# Patient Record
Sex: FEMALE | Race: BLACK OR AFRICAN AMERICAN | NOT HISPANIC OR LATINO | Employment: FULL TIME | URBAN - METROPOLITAN AREA
[De-identification: names, ages, dates, MRNs, and addresses within clinical notes are randomized per-mention and may not be internally consistent; named-entity substitution may affect disease eponyms.]

---

## 2017-10-02 ENCOUNTER — TELEPHONE (OUTPATIENT)
Dept: ENDOSCOPY | Facility: HOSPITAL | Age: 52
End: 2017-10-02

## 2017-10-02 ENCOUNTER — OFFICE VISIT (OUTPATIENT)
Dept: GASTROENTEROLOGY | Facility: CLINIC | Age: 52
End: 2017-10-02
Payer: COMMERCIAL

## 2017-10-02 VITALS
HEART RATE: 65 BPM | HEIGHT: 65 IN | SYSTOLIC BLOOD PRESSURE: 123 MMHG | WEIGHT: 221.56 LBS | BODY MASS INDEX: 36.91 KG/M2 | DIASTOLIC BLOOD PRESSURE: 77 MMHG

## 2017-10-02 DIAGNOSIS — K21.9 GASTROESOPHAGEAL REFLUX DISEASE WITHOUT ESOPHAGITIS: Primary | ICD-10-CM

## 2017-10-02 PROCEDURE — 3008F BODY MASS INDEX DOCD: CPT | Mod: S$GLB,,, | Performed by: PHYSICIAN ASSISTANT

## 2017-10-02 PROCEDURE — 99999 PR PBB SHADOW E&M-NEW PATIENT-LVL III: CPT | Mod: PBBFAC,,, | Performed by: PHYSICIAN ASSISTANT

## 2017-10-02 PROCEDURE — 99204 OFFICE O/P NEW MOD 45 MIN: CPT | Mod: S$GLB,,, | Performed by: PHYSICIAN ASSISTANT

## 2017-10-02 NOTE — PROGRESS NOTES
[facility] Consultation Note    Reason for Consult:  Gastroesophageal reflux    PCP: Carrillo Jackson III     Referring MD:   Aaareferral Self  No address on file    HPI:  This is a 52 y.o. female here for evaluation of gastroesophageal reflux.  Duration - off an on for a few years  Currently taking - pepcid takes a while to work, chewable gaviscon as needed    Typical GERD Symptoms  Pyrosis - severe  Regurgitation- single episode that she can recall.  Nocturnal symptoms - rare  Nausea-no  Vomiting-no   Dysphagia/Odynophagia - rare  Epigastric abdominal pain- no     Diet / Lifestyle:  Last Meal at 7p lying down / reclining at  10:30p       Food Triggers - red sauce, spicy, she is avoiding them  Caffeine intake - rare  Occasional alcohol, occasional soda  NSAID usage - occasional    Prior EGD date/findings: no   DEXA Scan: no    Colonoscopy at age 49 - no polyps - at [facility]    ROS:  Constitutional: No fevers, chills, No weight loss  ENT: No allergies  CV: No chest pain  Pulm: No cough, No shortness of breath  Ophtho: No vision changes  GI: see HPI  Derm: No rash  Heme: No lymphadenopathy, No bruising  MSK: No arthritis  : No dysuria, No hematuria  Endo: No hot or cold intolerance  Neuro: No syncope, No seizure  Psych: No anxiety, No depression    Medical History:  has a past medical history of GERD (gastroesophageal reflux disease).    Surgical History:  has a past surgical history that includes  section, classic () and Hysterectomy.    Family History: family history includes Breast cancer in her sister and sister; Diabetes in her mother; Hypertension in her brother, father, mother, sister, and sister..     Social History:  reports that she has never smoked. She has never used smokeless tobacco. She reports that she drinks alcohol.    Review of patient's allergies indicates:  No Known Allergies    No current outpatient prescriptions on file.     No current facility-administered  "medications for this visit.          Objective Findings:    Vital Signs:  /77   Pulse 65   Ht 5' 5" (1.651 m)   Wt 100.5 kg (221 lb 9 oz)   BMI 36.87 kg/m²   Body mass index is 36.87 kg/m².    Physical Exam:  General Appearance: Well appearing in no acute distress  Head:   Normocephalic, without obvious abnormality  Eyes:    No scleral icterus  ENT: Neck supple, Lips, mucosa, and tongue normal  Lungs: CTA bilaterally in anterior and posterior fields, no wheezes, no crackles.  Heart:  Regular rate and rhythm, S1, S2 normal, no murmurs heard  Abdomen: Soft, non tender, non distended with positive bowel sounds in all four quadrants. No hepatosplenomegaly, ascites, or mass  Extremities: 2+ pulses, no clubbing, cyanosis or edema  Skin: No rash  Neurologic: AAO x 3      Labs:  No results found for: WBC, HGB, HCT, PLT, CHOL, TRIG, HDL, LDLDIRECT, ALT, AST, NA, K, CL, CREATININE, BUN, CO2, TSH, PSA, INR, GLUF, HGBA1C, MICROALBUR      Endoscopy:   Colonoscopy at age 49 - no polyps - at [facility]  Assessment:  1. Gastroesophageal reflux disease without esophagitis       51yo F with frequent GERD for several yrs. Poor control with pepcid prn. She has not tried taking an antacid daily. She may have a hiatal hernia    Recommendations:  1. Schedulr EGD to rule out Hernandez's and esophagitis  2. Start zantac 150mg twice daily. If no relief in 2 week then will prescribe protonix 40mg daily   3. GERD diet  Return in about 2 months (around 12/2/2017).      Order summary:  Orders Placed This Encounter    Case request GI: ESOPHAGOGASTRODUODENOSCOPY (EGD)         Thank you so much for allowing me to participate in the care of Carol Dumas PA-C        "

## 2017-10-02 NOTE — PATIENT INSTRUCTIONS
TAKE 150MG ZANTAC BEFORE BREAKFAST AND 150MG BEFORE DINNER    For GERD:    Take your PPI 30-45 minutes before your first protein containing meal (breakfast) every day.    Remain upright for at least 3 hours after eating.    Elevate the head of the bead about 6 inches.  Some patients place cinder blocks under the head of the bed for elevation.    Avoid foods that you have noticed make your symptoms worse.    Set a weight loss goal of 10% of your body weight. (For example, if you weigh 150 lbs, your goal should be to loose 15 lbs).    Http://www.refluxcookbook.com/  Dropping Acid The Reflux Diet Cookbook and Parisa -  Calderon Roque M.D.    GERD  Worst Foods for Acid Reflux  Chocolate (milk chocolate worse than dark chocolate)  Soda (all carbonated beverages)  Alcohol (beer, liquor, wine)  Fried foods  George, sausage, ribs  Cream sauce  Fatty meats (beef)  Butter, margarine, lard, shortening  Coffee, tea  Mint   High fat nuts  Hot sauces and pepper  Citrus fruit/juices      Acidic foods (pH - 1 is MORE acidic, 5 is LESS acidic)     Do not eat or drink these (lower numbers are worse)    Induction diet - For 2 weeks eat nothing below pH 5     Lemon juice 2.3  Grape cranberry juice 2.5  Stomach Acid 2.5  Gelatin Dessert 2.6  Lemon/lime 2.9/2.7  Vinegar 2.9  Gatorade 3.0  Fruits - plums, apricots, strawberries, cherries 3.0  Vitamin C (ascorbic acid) 3.0  Iced tea, Snapple 3.1  Mustard 3.2  Soft drinks 3.3  Nectarines 3.3  Pomegranate 3.3  Applesauce 3.4  Grapefruit 3.4  Kiwi 3.4  Barbecue sauce 3.4  Caesar dressing 3.5  Thousand island dressing 3.6  Strawberries 3.5  Pineapple juice 3.5  Beer 3.5  Wine 3.5  Grape 3.6  Apples 3.6  Pineapple 3.7  Pickle 3.7  Blackberries, blueberries 3.7  Leon 3.7  Orange 3.8  Cherries 3.9  Red Bull 3.9  Tomatoes 4.2  Coffee 5.1      These are Safe foods:  Agave  Aloe Vera  Apple (only red)  Bagels  Banana (worsens reflux in 1%)  Beans - black, red, lima, lentils  Bread - whole grain,  rye  Caramel  Celery  Chamomile tea  Chicken - skinless, never fried  Chicken stock or bouillon  Coffee - one cup/day with milk  Fennel  Fish  Margy  Green vegetables (no green peppers)  Herbs  Honey  Melon  Milk - skin, soy, or Lactaid skim milk  Mushrooms  Oatmeal  Olive oil  Parsley  Pasta  Pears  Popcorn  Potatoes  Red bell peppers  Rice  Soups  Tofu  Turkey Breast  Turnip  Vegetables - no onion, tomatoes, peppers  Vinaigrette  Water - non carbonated  Whole grain breads, crackers, breakfast cereals      Best Foods for Acid Reflux  Whole grain breads  Oatmeal  Aloe Vera  Salad (no tomatoes, onions, cheese, or high fat dressing)  Banana  Melon  Fennel  Chicken and turkey (skinless, never fried)  Fish/seafood (never fried)  Celery  Parsley  Couscous and Rice    Maybe bad foods (Everyone is unique)  Tomatoes  Garlic  Onion  Nuts (macadamia nuts)  Apples (especially green)  Cucumber  Green peppers  Spicy food  Some herbal teas    GERD tips  Change what you eat:  Eat smaller meals  Eat slowly and chew thoroughly until food is almost liquid  Cut down on junk carbohydrates such as sugar and white flour  Use herbs in your cooking  Eat more raw foods (more than 10 ingredients is not a raw food)  Avoid trans fats and partially hydrogenated oils  Eat more fish and switch to grass fed beef  Switch your cooking oil to macadamia nut or olive oil  Watch extremes of salt intake (too high or too low is bad)    If just cutting out acidic foods is not enough, change how you eat:  Large breakfast, medium lunch, light dinner  Dont mix fruit juices, sweet fruits, and refined starches with meats and heavy food  Dont wash your food down with a lot of liquid    List A Proteins - meat, poultry, cheese, eggs, fish, beans, yogurt    List B Neutral - vegetables, salads, seeds, nuts, herbs, cream, butter, olive oil    List C Starches - biscuit, breads, cake, crackers, oats, pasta, potatoes, rice, sugar/honey, sweets    A + B = ok  B + C =  ok  Never mix list A and C!!    Change these habits:  Stop smoking  Eat dinner earlier (3-4 hours before lying down to sleep)  Elevate the head of your bed 6 inches (blocks under the head of the bed are better than pillows)  Exercise (but wait 2 hours after eating)  Drink more water (between meals)    Take these supplements:  Multi vitamin  Probiotic  Fish oil    Most common food allergens: milk, eggs, peanuts, tree nuts, fish, shellfish, wheat, and soy    All natural immediate relief:  Chew 2-3 soft probiotic capsules - Dr. Navarro's Probiotics 12 Plus  Chew chewable DGL licorice tablet  Chew papaya tablet with high protein meal - [employer]  Drink 2 ounces of aloe vera juice  Swedish bitters  Prelief- reduce the acid in food to keep it form burning sensitive tissue  Iberogast  [place]  Drink Chamomile Tea  Teaspoon of baking soda in water  Spoonful of vinegar in water      All natural ulcer healers:  Zinc carnosine - 75.5 mg with food twice a day x 8 weeks   Jason Millard - $8 for 60 pills  DGL (deglycyrrhizinated licorice) - 2 tablets before meals. Heals stomach lining   Natural Factors brand, Enzymatic therapy brand.  Aloe Vera juice  - 2 to 8 ounces a day   [place] or [place]

## 2017-10-21 ENCOUNTER — ANESTHESIA EVENT (OUTPATIENT)
Dept: ENDOSCOPY | Facility: HOSPITAL | Age: 52
End: 2017-10-21
Payer: COMMERCIAL

## 2017-10-23 ENCOUNTER — SURGERY (OUTPATIENT)
Age: 52
End: 2017-10-23

## 2017-10-23 ENCOUNTER — ANESTHESIA (OUTPATIENT)
Dept: ENDOSCOPY | Facility: HOSPITAL | Age: 52
End: 2017-10-23
Payer: COMMERCIAL

## 2017-10-23 ENCOUNTER — HOSPITAL ENCOUNTER (OUTPATIENT)
Facility: HOSPITAL | Age: 52
Discharge: HOME | End: 2017-10-23
Attending: INTERNAL MEDICINE | Admitting: INTERNAL MEDICINE
Payer: COMMERCIAL

## 2017-10-23 VITALS
TEMPERATURE: 98 F | OXYGEN SATURATION: 100 % | SYSTOLIC BLOOD PRESSURE: 156 MMHG | WEIGHT: 220 LBS | RESPIRATION RATE: 18 BRPM | HEIGHT: 65 IN | BODY MASS INDEX: 36.65 KG/M2 | HEART RATE: 65 BPM | DIASTOLIC BLOOD PRESSURE: 82 MMHG

## 2017-10-23 DIAGNOSIS — K21.9 GERD (GASTROESOPHAGEAL REFLUX DISEASE): ICD-10-CM

## 2017-10-23 DIAGNOSIS — K21.9 GASTROESOPHAGEAL REFLUX DISEASE WITHOUT ESOPHAGITIS: Primary | ICD-10-CM

## 2017-10-23 PROCEDURE — 37000009 HC ANESTHESIA EA ADD 15 MINS: Performed by: INTERNAL MEDICINE

## 2017-10-23 PROCEDURE — D9220A PRA ANESTHESIA: Mod: CRNA,,, | Performed by: NURSE ANESTHETIST, CERTIFIED REGISTERED

## 2017-10-23 PROCEDURE — D9220A PRA ANESTHESIA: Mod: ANES,,, | Performed by: ANESTHESIOLOGY

## 2017-10-23 PROCEDURE — 43235 EGD DIAGNOSTIC BRUSH WASH: CPT | Performed by: INTERNAL MEDICINE

## 2017-10-23 PROCEDURE — 63600175 PHARM REV CODE 636 W HCPCS: Performed by: NURSE ANESTHETIST, CERTIFIED REGISTERED

## 2017-10-23 PROCEDURE — 43235 EGD DIAGNOSTIC BRUSH WASH: CPT | Mod: ,,, | Performed by: INTERNAL MEDICINE

## 2017-10-23 PROCEDURE — 25000003 PHARM REV CODE 250: Performed by: NURSE ANESTHETIST, CERTIFIED REGISTERED

## 2017-10-23 PROCEDURE — 25000003 PHARM REV CODE 250: Performed by: INTERNAL MEDICINE

## 2017-10-23 PROCEDURE — 37000008 HC ANESTHESIA 1ST 15 MINUTES: Performed by: INTERNAL MEDICINE

## 2017-10-23 RX ORDER — GLYCOPYRROLATE 0.2 MG/ML
INJECTION INTRAMUSCULAR; INTRAVENOUS
Status: DISCONTINUED | OUTPATIENT
Start: 2017-10-23 | End: 2017-10-23

## 2017-10-23 RX ORDER — LIDOCAINE HCL/PF 100 MG/5ML
SYRINGE (ML) INTRAVENOUS
Status: DISCONTINUED | OUTPATIENT
Start: 2017-10-23 | End: 2017-10-23

## 2017-10-23 RX ORDER — SODIUM CHLORIDE 9 MG/ML
INJECTION, SOLUTION INTRAVENOUS CONTINUOUS
Status: DISCONTINUED | OUTPATIENT
Start: 2017-10-23 | End: 2017-10-23 | Stop reason: HOSPADM

## 2017-10-23 RX ORDER — PROPOFOL 10 MG/ML
VIAL (ML) INTRAVENOUS
Status: DISCONTINUED | OUTPATIENT
Start: 2017-10-23 | End: 2017-10-23

## 2017-10-23 RX ORDER — ESTRADIOL 1 MG/1
1 TABLET ORAL DAILY
COMMUNITY
End: 2023-01-30

## 2017-10-23 RX ADMIN — LIDOCAINE HYDROCHLORIDE 50 MG: 20 INJECTION, SOLUTION INTRAVENOUS at 02:10

## 2017-10-23 RX ADMIN — PROPOFOL 50 MG: 10 INJECTION, EMULSION INTRAVENOUS at 03:10

## 2017-10-23 RX ADMIN — PROPOFOL 50 MG: 10 INJECTION, EMULSION INTRAVENOUS at 02:10

## 2017-10-23 RX ADMIN — PROPOFOL 100 MG: 10 INJECTION, EMULSION INTRAVENOUS at 02:10

## 2017-10-23 RX ADMIN — SODIUM CHLORIDE: 900 INJECTION, SOLUTION INTRAVENOUS at 02:10

## 2017-10-23 RX ADMIN — GLYCOPYRROLATE 0.2 MG: 0.2 INJECTION, SOLUTION INTRAMUSCULAR; INTRAVENOUS at 02:10

## 2017-10-23 NOTE — INTERVAL H&P NOTE
Pre-Procedure H and P Addendum    Patient seen and examined.  History and exam unchanged from prior history and physical.      Procedure: EGD  Indication: GERD  ASA Class: per anesthesiology  Airway: normal  Neck Mobility: full range of motion  Mallampatti score: per anesthesia  History of anesthesia problems: no  Family history of anesthesia problems: no  Anesthesia Plan: MAC    Anesthesia/Surgery risks, benefits and alternative options discussed and understood by patient/family.          Active Hospital Problems    Diagnosis  POA    GERD (gastroesophageal reflux disease) [K21.9]  Yes      Resolved Hospital Problems    Diagnosis Date Resolved POA   No resolved problems to display.

## 2017-10-23 NOTE — TRANSFER OF CARE
"Anesthesia Transfer of Care Note    Patient: Carol Collins    Procedure(s) Performed: Procedure(s) (LRB):  ESOPHAGOGASTRODUODENOSCOPY (EGD) (N/A)    Anesthesia Type: general    Transport from OR: Transported from OR on 2-3 L/min O2 by NC with adequate spontaneous ventilation    Post pain: adequate analgesia    Post assessment: no apparent anesthetic complications    Post vital signs: stable    Level of consciousness: awake and alert    Nausea/Vomiting: no nausea/vomiting    Complications: none    Transfer of care protocol was followed      Last vitals:   Visit Vitals  BP (!) 152/70 (BP Location: Left arm, Patient Position: Lying)   Pulse 64   Temp 36.4 °C (97.5 °F) (Oral)   Resp 16   Ht 5' 5" (1.651 m)   Wt 99.8 kg (220 lb)   SpO2 100%   Breastfeeding? No   BMI 36.61 kg/m²     "

## 2017-10-23 NOTE — PATIENT INSTRUCTIONS
Discharge Summary/Instructions after an Endoscopic Procedure  Patient Name: Carlo Collins  Patient MRN: [id number]  Patient YOB: 1965  Monday, October 23, 2017  Pernell Fernandes MD  RESTRICTIONS:  During your procedure today, you received medications for sedation.  These   medications may affect your judgment, balance and coordination.  Therefore,   for 24 hours, you have the following restrictions:   - DO NOT drive a car, operate machinery, make legal/financial decisions,   sign important papers or drink alcohol.    ACTIVITY:  The following day: return to full activity including work, except no heavy   lifting, straining or running for 3 days if polyps were removed.  DIET:  Eat and drink normally unless instructed otherwise.     TREATMENT FOR COMMON SIDE EFFECTS:  - Mild abdominal pain, belching, bloating or excessive gas: rest, eat   lightly and use a heating pad.  - Sore Throat: treat with throat lozenges and/or gargle with warm salt   water.  SYMPTOMS TO WATCH FOR AND REPORT TO YOUR PHYSICIAN:  1. Abdominal pain or bloating, other than gas cramps.  2. Chest pain.  3. Back pain.  4. Chills or fever occurring within 24 hours after the procedure.  5. Rectal bleeding, which would show as bright red, maroon, or black stools.   (A tablespoon of blood from the rectum is not serious, especially if   hemorrhoids are present.)  6. Vomiting.  7. Weakness or dizziness.  8. Because air was used during the procedure, expelling large amounts of air   from your rectum or belching is normal.  9. If a bowel prep was taken, you may not have a bowel movement for 1-3   days.  This is normal.  GO DIRECTLY TO THE NEAREST EMERGENCY ROOM IF YOU HAVE ANY OF THE FOLLOWING:      Difficulty breathing  Chills and/or fever over 101 F   Persistent vomiting and/or vomiting blood   Severe abdominal pain   Severe chest pain   Black, tarry stools   Bleeding- more than one tablespoon  Your doctor recommends these additional  instructions:  If any biopsies were taken, your doctors clinic will contact you in 1 to 2   weeks with any results.  You are being discharged to home.   You have a contact number available for emergencies.  The signs and symptoms   of potential delayed complications were discussed with you.  You may return   to normal activities tomorrow.  Written discharge instructions were   provided to you.   Resume your previous diet.   Continue your present medications.   Return to your GI clinic.  For questions, problems or results please call your physician - Pernell Fernandes MD at Work:  [phone].  [facility], EMERGENCY ROOM PHONE NUMBER: [phone]  IF A COMPLICATION OR EMERGENCY SITUATION ARISES AND YOU ARE UNABLE TO REACH   YOUR PHYSICIAN - GO DIRECTLY TO THE EMERGENCY ROOM.  Pernell Fernandes MD  10/23/2017 3:05:18 PM  This report has been verified and signed electronically.

## 2017-10-23 NOTE — H&P (VIEW-ONLY)
[facility] Consultation Note    Reason for Consult:  Gastroesophageal reflux    PCP: Carrillo Jackson III     Referring MD:   Aaareferral Self  No address on file    HPI:  This is a 52 y.o. female here for evaluation of gastroesophageal reflux.  Duration - off an on for a few years  Currently taking - pepcid takes a while to work, chewable gaviscon as needed    Typical GERD Symptoms  Pyrosis - severe  Regurgitation- single episode that she can recall.  Nocturnal symptoms - rare  Nausea-no  Vomiting-no   Dysphagia/Odynophagia - rare  Epigastric abdominal pain- no     Diet / Lifestyle:  Last Meal at 7p lying down / reclining at  10:30p       Food Triggers - red sauce, spicy, she is avoiding them  Caffeine intake - rare  Occasional alcohol, occasional soda  NSAID usage - occasional    Prior EGD date/findings: no   DEXA Scan: no    Colonoscopy at age 49 - no polyps - at [facility]    ROS:  Constitutional: No fevers, chills, No weight loss  ENT: No allergies  CV: No chest pain  Pulm: No cough, No shortness of breath  Ophtho: No vision changes  GI: see HPI  Derm: No rash  Heme: No lymphadenopathy, No bruising  MSK: No arthritis  : No dysuria, No hematuria  Endo: No hot or cold intolerance  Neuro: No syncope, No seizure  Psych: No anxiety, No depression    Medical History:  has a past medical history of GERD (gastroesophageal reflux disease).    Surgical History:  has a past surgical history that includes  section, classic () and Hysterectomy.    Family History: family history includes Breast cancer in her sister and sister; Diabetes in her mother; Hypertension in her brother, father, mother, sister, and sister..     Social History:  reports that she has never smoked. She has never used smokeless tobacco. She reports that she drinks alcohol.    Review of patient's allergies indicates:  No Known Allergies    No current outpatient prescriptions on file.     No current facility-administered  "medications for this visit.          Objective Findings:    Vital Signs:  /77   Pulse 65   Ht 5' 5" (1.651 m)   Wt 100.5 kg (221 lb 9 oz)   BMI 36.87 kg/m²   Body mass index is 36.87 kg/m².    Physical Exam:  General Appearance: Well appearing in no acute distress  Head:   Normocephalic, without obvious abnormality  Eyes:    No scleral icterus  ENT: Neck supple, Lips, mucosa, and tongue normal  Lungs: CTA bilaterally in anterior and posterior fields, no wheezes, no crackles.  Heart:  Regular rate and rhythm, S1, S2 normal, no murmurs heard  Abdomen: Soft, non tender, non distended with positive bowel sounds in all four quadrants. No hepatosplenomegaly, ascites, or mass  Extremities: 2+ pulses, no clubbing, cyanosis or edema  Skin: No rash  Neurologic: AAO x 3      Labs:  No results found for: WBC, HGB, HCT, PLT, CHOL, TRIG, HDL, LDLDIRECT, ALT, AST, NA, K, CL, CREATININE, BUN, CO2, TSH, PSA, INR, GLUF, HGBA1C, MICROALBUR      Endoscopy:   Colonoscopy at age 49 - no polyps - at [facility]  Assessment:  1. Gastroesophageal reflux disease without esophagitis       53yo F with frequent GERD for several yrs. Poor control with pepcid prn. She has not tried taking an antacid daily. She may have a hiatal hernia    Recommendations:  1. Schedulr EGD to rule out Hernandez's and esophagitis  2. Start zantac 150mg twice daily. If no relief in 2 week then will prescribe protonix 40mg daily   3. GERD diet  Return in about 2 months (around 12/2/2017).      Order summary:  Orders Placed This Encounter    Case request GI: ESOPHAGOGASTRODUODENOSCOPY (EGD)         Thank you so much for allowing me to participate in the care of Carol Dumas PA-C        "

## 2017-10-23 NOTE — ANESTHESIA PREPROCEDURE EVALUATION
10/23/2017  Carol Collins is a 52 y.o., female.    Anesthesia Evaluation    I have reviewed the Patient Summary Reports.    I have reviewed the Nursing Notes.   I have reviewed the Medications.     Review of Systems  Anesthesia Hx:  No problems with previous Anesthesia  History of prior surgery of interest to airway management or planning: Previous anesthesia: General Denies Family Hx of Anesthesia complications.   Denies Personal Hx of Anesthesia complications.   Social:  Non-Smoker    Hematology/Oncology:  Hematology Normal   Oncology Normal     EENT/Dental:EENT/Dental Normal   Cardiovascular:  Cardiovascular Normal Exercise tolerance: good     Pulmonary:  Pulmonary Normal    Renal/:  Renal/ Normal     Hepatic/GI:   GERD    Musculoskeletal:  Musculoskeletal Normal    Neurological:  Neurology Normal    Endocrine:  Endocrine Normal    Dermatological:  Skin Normal    Psych:  Psychiatric Normal           Physical Exam  General:  Well nourished    Airway/Jaw/Neck:  Airway Findings: Mouth Opening: Normal Tongue: Normal  General Airway Assessment: Adult, Average  Mallampati: III  Improves to II with phonation.  TM Distance: 4 - 6 cm        Eyes/Ears/Nose:  EYES/EARS/NOSE FINDINGS: Normal   Dental:  Dental Findings: In tact   Chest/Lungs:  Chest/Lungs Findings: Clear to auscultation, Normal Respiratory Rate     Heart/Vascular:  Heart Findings: Rate: Normal  Rhythm: Regular Rhythm  Sounds: Normal  Heart murmur: negative Vascular Findings: Normal    Abdomen:  Abdomen Findings: Normal    Musculoskeletal:  Musculoskeletal Findings: Normal   Skin:  Skin Findings: Normal    Mental Status:  Mental Status Findings:  Cooperative, Alert and Oriented         Anesthesia Plan  Type of Anesthesia, risks & benefits discussed:  Anesthesia Type:  general  Patient's Preference:   Intra-op Monitoring Plan:   Intra-op Monitoring  Plan Comments:   Post Op Pain Control Plan:   Post Op Pain Control Plan Comments:   Induction:   IV  Beta Blocker:  Patient is not currently on a Beta-Blocker (No further documentation required).       Informed Consent: Patient understands risks and agrees with Anesthesia plan.  Questions answered. Anesthesia consent signed with patient.  ASA Score: 2     Day of Surgery Review of History & Physical:            Ready For Surgery From Anesthesia Perspective.

## 2017-10-23 NOTE — ANESTHESIA POSTPROCEDURE EVALUATION
"Anesthesia Post Evaluation    Patient: Carol Collins    Procedure(s) Performed: Procedure(s) (LRB):  ESOPHAGOGASTRODUODENOSCOPY (EGD) (N/A)    Final Anesthesia Type: general  Patient location during evaluation: GI PACU  Patient participation: Yes- Able to Participate  Level of consciousness: awake and alert and oriented  Post-procedure vital signs: reviewed and stable  Pain management: adequate  Airway patency: patent  PONV status at discharge: No PONV  Anesthetic complications: no      Cardiovascular status: hemodynamically stable  Respiratory status: unassisted, spontaneous ventilation and room air  Hydration status: euvolemic  Follow-up not needed.        Visit Vitals  BP (!) 156/82 (BP Location: Left arm, Patient Position: Sitting)   Pulse 65   Temp 36.5 °C (97.7 °F) (Temporal)   Resp 18   Ht 5' 5" (1.651 m)   Wt 99.8 kg (220 lb)   SpO2 100%   Breastfeeding? No   BMI 36.61 kg/m²       Pain/Miguel Score: Pain Assessment Performed: Yes (10/23/2017  3:36 PM)  Presence of Pain: denies (10/23/2017  3:36 PM)  Miguel Score: 10 (10/23/2017  3:21 PM)      "

## 2017-10-30 ENCOUNTER — TELEPHONE (OUTPATIENT)
Dept: ENDOSCOPY | Facility: HOSPITAL | Age: 52
End: 2017-10-30

## 2017-11-05 ENCOUNTER — PATIENT MESSAGE (OUTPATIENT)
Dept: GASTROENTEROLOGY | Facility: CLINIC | Age: 52
End: 2017-11-05

## 2017-11-27 ENCOUNTER — PATIENT MESSAGE (OUTPATIENT)
Dept: GASTROENTEROLOGY | Facility: CLINIC | Age: 52
End: 2017-11-27

## 2018-01-09 ENCOUNTER — OFFICE VISIT (OUTPATIENT)
Dept: ENDOCRINOLOGY | Facility: CLINIC | Age: 53
End: 2018-01-09
Payer: COMMERCIAL

## 2018-01-09 VITALS
HEART RATE: 70 BPM | SYSTOLIC BLOOD PRESSURE: 124 MMHG | WEIGHT: 219.81 LBS | BODY MASS INDEX: 36.62 KG/M2 | DIASTOLIC BLOOD PRESSURE: 90 MMHG | HEIGHT: 65 IN

## 2018-01-09 DIAGNOSIS — N95.1 MENOPAUSAL SYMPTOMS: ICD-10-CM

## 2018-01-09 PROCEDURE — 99204 OFFICE O/P NEW MOD 45 MIN: CPT | Mod: S$GLB,,, | Performed by: INTERNAL MEDICINE

## 2018-01-09 PROCEDURE — 99999 PR PBB SHADOW E&M-EST. PATIENT-LVL III: CPT | Mod: PBBFAC,,, | Performed by: INTERNAL MEDICINE

## 2018-01-09 RX ORDER — MULTIVITAMIN
1 TABLET ORAL DAILY
COMMUNITY
End: 2019-02-27

## 2018-01-09 NOTE — ASSESSMENT & PLAN NOTE
alerted as to the increased risk for t2DM  Stressed diet and exercise  Goal 5-7% weight loss    Periodic hba1c levels

## 2018-01-09 NOTE — PROGRESS NOTES
Subjective:      Patient ID: Carol Collins is a 52 y.o. female.    Chief Complaint:  Hormone evaluation      History of Present Illness    Partial CARRI 2014   Had night sweats hair loss hot flashes anxiety  Started ert 2016   initially felt better but now symptoms have returned  Hair loss nail splitting      With regards to obesity:  Denies symptoms of hyperglycemia such as polyuria, polydipsia, nocturia, unexplained weight loss or blurred vision    Last bmd  - none   No fractures     On probiotics for constipation       Review of Systems   Constitutional: Negative for unexpected weight change.   Eyes: Negative for visual disturbance.   Respiratory: Negative for shortness of breath.    Cardiovascular: Negative for chest pain.   Gastrointestinal: Negative for abdominal pain.   Musculoskeletal: Negative for arthralgias.   Skin: Negative for wound.   Neurological: Negative for headaches.   Hematological: Does not bruise/bleed easily.   Psychiatric/Behavioral: Negative for sleep disturbance.       Objective:   Physical Exam   Constitutional: She appears well-developed.   HENT:   Right Ear: External ear normal.   Left Ear: External ear normal.   Nose: Nose normal.   Hearing normal  Dentition good    Neck: No tracheal deviation present. No thyromegaly present.   Thyroid palpable    Cardiovascular: Normal rate.    No murmur heard.  Pulmonary/Chest: Effort normal and breath sounds normal.   Abdominal: Soft. There is no tenderness. No hernia.   Musculoskeletal: She exhibits no edema.   Gait normal  No clubbing or cyanosis noted   Lymphadenopathy:     She has no cervical adenopathy.   Neurological: She displays normal reflexes. No cranial nerve deficit.   Skin: No rash noted.   No nodules       Psychiatric: She has a normal mood and affect. Judgment normal.   Vitals reviewed.      Body mass index is 36.58 kg/m².    Lab Review:   Lab Results   Component Value Date    HGBA1C 5.3 01/09/2018     No results found for: CHOL, HDL,  LDLCALC, TRIG, CHOLHDL  Lab Results   Component Value Date     01/09/2018    K 3.8 01/09/2018     01/09/2018    CO2 30 (H) 01/09/2018    GLU 88 01/09/2018    BUN 10 01/09/2018    CREATININE 0.8 01/09/2018    CALCIUM 9.3 01/09/2018    PROT 7.9 01/09/2018    ALBUMIN 3.1 (L) 01/09/2018    BILITOT 0.4 01/09/2018    ALKPHOS 120 01/09/2018    AST 19 01/09/2018    ALT 19 01/09/2018    ANIONGAP 6 (L) 01/09/2018    ESTGFRAFRICA >60.0 01/09/2018    EGFRNONAA >60.0 01/09/2018    TSH 1.164 01/09/2018       Assessment and Plan     Menopausal symptoms  Basic labs   Refer to Dr Plummer     BMI 36.0-36.9,adult  alerted as to the increased risk for t2DM  Stressed diet and exercise  Goal 5-7% weight loss    Periodic hba1c levels

## 2018-01-12 ENCOUNTER — PATIENT MESSAGE (OUTPATIENT)
Dept: ENDOCRINOLOGY | Facility: CLINIC | Age: 53
End: 2018-01-12

## 2018-01-15 ENCOUNTER — PATIENT MESSAGE (OUTPATIENT)
Dept: ENDOCRINOLOGY | Facility: CLINIC | Age: 53
End: 2018-01-15

## 2018-01-15 ENCOUNTER — TELEPHONE (OUTPATIENT)
Dept: ENDOCRINOLOGY | Facility: CLINIC | Age: 53
End: 2018-01-15

## 2018-01-15 NOTE — TELEPHONE ENCOUNTER
----- Message from Doris Nunez sent at 1/15/2018  9:48 AM CST -----  Contact: Phil tel:   [phone]  Pt. Is returning your call.    Pls call again .

## 2018-01-15 NOTE — TELEPHONE ENCOUNTER
Reviewed w pt - her hand pain is improving, no more swelling, though intermittently hurts.  Asked pt to follow up with PCP if continues or progresses. Pt is agreeable.     Cady Dailey MD

## 2018-05-02 ENCOUNTER — OFFICE VISIT (OUTPATIENT)
Dept: CARDIOLOGY | Facility: CLINIC | Age: 53
End: 2018-05-02
Payer: COMMERCIAL

## 2018-05-02 ENCOUNTER — HOSPITAL ENCOUNTER (OUTPATIENT)
Dept: CARDIOLOGY | Facility: CLINIC | Age: 53
Discharge: HOME | End: 2018-05-02
Payer: COMMERCIAL

## 2018-05-02 VITALS
BODY MASS INDEX: 37.06 KG/M2 | HEART RATE: 81 BPM | HEIGHT: 65 IN | SYSTOLIC BLOOD PRESSURE: 121 MMHG | DIASTOLIC BLOOD PRESSURE: 63 MMHG | WEIGHT: 222.44 LBS

## 2018-05-02 DIAGNOSIS — R00.2 PALPITATIONS: Primary | ICD-10-CM

## 2018-05-02 DIAGNOSIS — R00.2 HEART PALPITATIONS: ICD-10-CM

## 2018-05-02 DIAGNOSIS — R00.2 PALPITATIONS: ICD-10-CM

## 2018-05-02 PROCEDURE — 93000 ELECTROCARDIOGRAM COMPLETE: CPT | Mod: S$GLB,,, | Performed by: INTERNAL MEDICINE

## 2018-05-02 PROCEDURE — 99203 OFFICE O/P NEW LOW 30 MIN: CPT | Mod: S$GLB,,, | Performed by: INTERNAL MEDICINE

## 2018-05-02 PROCEDURE — 99999 PR PBB SHADOW E&M-EST. PATIENT-LVL III: CPT | Mod: PBBFAC,,, | Performed by: INTERNAL MEDICINE

## 2018-05-02 NOTE — PROGRESS NOTES
"    Cardiology Clinic Note  Reason for Visit: Palpitations    HPI:   Ms. Collins is a 53 year old AAF with history of hysterectomy on estrogen supplements who presents today for evaluation for palpitations. She has had very infrequent, short episodes of "fluttering" in her chest for several months. The episodes occur maybe twice per 6 months and last less than a few seconds. She has no CP, SOB or LOC with these episodes. She feels like her heart skips a beat or two and returns to normal. She has been under a lot of stress at work lately where she works with [employer] and has had a very stressful situation with two colleagues. She does not smoke, do drugs, drink caffeine and only has an occasional glass of wine. No significant family history. Yesterday at work, felt more fatigued than usual and felt the fluttering in her chest. Nurse listened to her heart and said she may have atrial fibrillation so she made this appointment today. Feels well today.    ROS:    Constitution: Negative for fever, chills, weight loss or gain.   HENT: Negative for sore throat, rhinorrhea, or headache.  Eyes: Negative for blurred or double vision.   Cardiovascular: See above  Pulmonary: Negative for SOB   Gastrointestinal: Negative for abdominal pain, nausea, vomiting, or diarrhea.   : Negative for dysuria.   Neurological: Negative for focal weakness or sensory changes.  PMH:     Past Medical History:   Diagnosis Date    GERD (gastroesophageal reflux disease)      Past Surgical History:   Procedure Laterality Date     SECTION, CLASSIC      HYSTERECTOMY       Allergies:   Review of patient's allergies indicates:  No Known Allergies  Medications:     Current Outpatient Prescriptions on File Prior to Visit   Medication Sig Dispense Refill    estradiol (ESTRACE) 1 MG tablet Take 1 mg by mouth once daily.      multivitamin (ONE DAILY MULTIVITAMIN) per tablet Take 1 tablet by mouth once daily.       No current " "facility-administered medications on file prior to visit.      Social History:     Social History   Substance Use Topics    Smoking status: Never Smoker    Smokeless tobacco: Never Used    Alcohol use Yes      Comment: rarely     Family History:     Family History   Problem Relation Age of Onset    Hypertension Mother     Diabetes Mother     Ovarian cancer Mother     Hypertension Father     Breast cancer Sister     Hypertension Sister     Hypertension Brother     Breast cancer Sister     Hypertension Sister     Stomach cancer Neg Hx     Colon cancer Neg Hx      Physical Exam:   /63 (BP Location: Left arm, Patient Position: Sitting, BP Method: Large (Automatic))   Pulse 81   Ht 5' 5" (1.651 m)   Wt 100.9 kg (222 lb 7.1 oz)   BMI 37.02 kg/m²      Constitutional: NAD, conversant  HEENT: Sclera anicteric, PERRLA, EOMI  Neck: No JVD, no carotid bruits  CV: RRR, no murmur, normal S1/S2  Pulm: CTAB, no wheezes, rales, or ronchi  GI: Abdomen soft, NTND, +BS  Extremities: No LE edema, warm and well perfused  Skin: No ecchymosis, erythema, or ulcers  Psych: AOx3, appropriate affect  Neuro: CNII-XII intact, no focal deficits    Labs:     Lab Results   Component Value Date     01/09/2018    K 3.8 01/09/2018     01/09/2018    CO2 30 (H) 01/09/2018    BUN 10 01/09/2018    CREATININE 0.8 01/09/2018    ANIONGAP 6 (L) 01/09/2018     Lab Results   Component Value Date    HGBA1C 5.3 01/09/2018     No results found for: BNP, BNPTRIAGEBLO Lab Results   Component Value Date    WBC 4.49 01/09/2018    HGB 13.8 01/09/2018    HCT 42.7 01/09/2018     01/09/2018    GRAN 1.7 (L) 01/09/2018    GRAN 37.5 (L) 01/09/2018     No results found for: CHOL, HDL, LDLCALC, TRIG       Imaging:     No results found for: EF    EKG: normal sinus rhythm, no blocks or conduction defects, no ischemic changes  Assessment:    Ms. Collins is a 53 year old woman with no significant PMH who presents today for very " infrequent palpitations that are very short lived in duration. I am no concerned at this time for underlying atrial fibrillation given her short duration of symptoms (<5 secs) and infrequency. She is in NSR rhythm today with a normal physical exam. Most likely, she is experiencing infrequent PVC/PAC in setting of excess stress at work and a lack of physical exercise.     Plan:   #Palpitations  --no further workup needed at this time  --recommend to increase physical activity to deal with stress    #Obesity  --increase physical stress and adhere to low fat, low salt diet    RTC prn    Signed:  Jayant Bajwa MD  Cardiology Fellow, PGY-5  Pager: [phone]  5/2/2018 3:47 PM

## 2018-05-02 NOTE — PROGRESS NOTES
I have personally interviewed and examined the patient. I have reviewed the notes, assessments and plans performed by Dr Bajwa and I CONCUR with his documentation of Carol Collins.

## 2018-06-12 ENCOUNTER — OFFICE VISIT (OUTPATIENT)
Dept: PSYCHIATRY | Facility: CLINIC | Age: 53
End: 2018-06-12
Payer: COMMERCIAL

## 2018-06-12 DIAGNOSIS — F41.9 ANXIETY: Primary | ICD-10-CM

## 2018-06-12 PROCEDURE — 90791 PSYCH DIAGNOSTIC EVALUATION: CPT | Mod: S$GLB,,, | Performed by: SOCIAL WORKER
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
## 2018-06-17 PROBLEM — F41.9 ANXIETY: Status: ACTIVE | Noted: 2018-06-17

## 2018-08-08 ENCOUNTER — OFFICE VISIT (OUTPATIENT)
Dept: PSYCHIATRY | Facility: CLINIC | Age: 53
End: 2018-08-08
Payer: COMMERCIAL

## 2018-08-08 DIAGNOSIS — F41.9 ANXIETY: Primary | ICD-10-CM

## 2018-08-08 PROCEDURE — 90834 PSYTX W PT 45 MINUTES: CPT | Mod: S$GLB,,, | Performed by: SOCIAL WORKER

## 2018-09-06 ENCOUNTER — OFFICE VISIT (OUTPATIENT)
Dept: PSYCHIATRY | Facility: CLINIC | Age: 53
End: 2018-09-06
Payer: COMMERCIAL

## 2018-09-06 DIAGNOSIS — F41.9 ANXIETY: Primary | ICD-10-CM

## 2018-09-06 PROCEDURE — 90834 PSYTX W PT 45 MINUTES: CPT | Mod: S$GLB,,, | Performed by: SOCIAL WORKER
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
## 2018-10-30 ENCOUNTER — PATIENT MESSAGE (OUTPATIENT)
Dept: ENDOCRINOLOGY | Facility: CLINIC | Age: 53
End: 2018-10-30

## 2019-02-27 ENCOUNTER — LAB VISIT (OUTPATIENT)
Dept: LAB | Facility: HOSPITAL | Age: 54
End: 2019-02-27
Payer: COMMERCIAL

## 2019-02-27 ENCOUNTER — OFFICE VISIT (OUTPATIENT)
Dept: INTERNAL MEDICINE | Facility: CLINIC | Age: 54
End: 2019-02-27
Payer: COMMERCIAL

## 2019-02-27 VITALS
HEIGHT: 65 IN | WEIGHT: 227.31 LBS | DIASTOLIC BLOOD PRESSURE: 84 MMHG | HEART RATE: 67 BPM | BODY MASS INDEX: 37.87 KG/M2 | OXYGEN SATURATION: 99 % | SYSTOLIC BLOOD PRESSURE: 116 MMHG

## 2019-02-27 DIAGNOSIS — K21.9 GASTROESOPHAGEAL REFLUX DISEASE WITHOUT ESOPHAGITIS: ICD-10-CM

## 2019-02-27 DIAGNOSIS — R11.2 NON-INTRACTABLE VOMITING WITH NAUSEA, UNSPECIFIED VOMITING TYPE: ICD-10-CM

## 2019-02-27 DIAGNOSIS — R35.0 URINARY FREQUENCY: ICD-10-CM

## 2019-02-27 DIAGNOSIS — E66.9 OBESITY (BMI 35.0-39.9 WITHOUT COMORBIDITY): ICD-10-CM

## 2019-02-27 DIAGNOSIS — K21.9 GASTROESOPHAGEAL REFLUX DISEASE WITHOUT ESOPHAGITIS: Primary | ICD-10-CM

## 2019-02-27 DIAGNOSIS — Z83.3 FAMILY HISTORY OF DIABETES MELLITUS IN MOTHER: ICD-10-CM

## 2019-02-27 PROBLEM — K44.9 HIATAL HERNIA: Status: ACTIVE | Noted: 2019-02-27

## 2019-02-27 PROBLEM — K22.2 SCHATZKI'S RING: Status: ACTIVE | Noted: 2019-02-27

## 2019-02-27 LAB
ALBUMIN SERPL BCP-MCNC: 3.5 G/DL
ALP SERPL-CCNC: 91 U/L
ALT SERPL W/O P-5'-P-CCNC: 20 U/L
AMYLASE SERPL-CCNC: 48 U/L
ANION GAP SERPL CALC-SCNC: 5 MMOL/L
AST SERPL-CCNC: 17 U/L
BACTERIA #/AREA URNS AUTO: NORMAL /HPF
BASOPHILS # BLD AUTO: 0.01 K/UL
BASOPHILS NFR BLD: 0.3 %
BILIRUB SERPL-MCNC: 0.3 MG/DL
BILIRUB UR QL STRIP: NEGATIVE
BUN SERPL-MCNC: 11 MG/DL
CALCIUM SERPL-MCNC: 9.6 MG/DL
CAOX CRY UR QL COMP ASSIST: NORMAL
CHLORIDE SERPL-SCNC: 106 MMOL/L
CLARITY UR REFRACT.AUTO: CLEAR
CO2 SERPL-SCNC: 32 MMOL/L
COLOR UR AUTO: YELLOW
CREAT SERPL-MCNC: 0.7 MG/DL
DIFFERENTIAL METHOD: ABNORMAL
EOSINOPHIL # BLD AUTO: 0.1 K/UL
EOSINOPHIL NFR BLD: 1.8 %
ERYTHROCYTE [DISTWIDTH] IN BLOOD BY AUTOMATED COUNT: 13 %
EST. GFR  (AFRICAN AMERICAN): >60 ML/MIN/1.73 M^2
EST. GFR  (NON AFRICAN AMERICAN): >60 ML/MIN/1.73 M^2
ESTIMATED AVG GLUCOSE: 117 MG/DL
GLUCOSE SERPL-MCNC: 88 MG/DL
GLUCOSE UR QL STRIP: NEGATIVE
HBA1C MFR BLD HPLC: 5.7 %
HCT VFR BLD AUTO: 42.3 %
HGB BLD-MCNC: 13.1 G/DL
HGB UR QL STRIP: ABNORMAL
KETONES UR QL STRIP: NEGATIVE
LEUKOCYTE ESTERASE UR QL STRIP: NEGATIVE
LIPASE SERPL-CCNC: 14 U/L
LYMPHOCYTES # BLD AUTO: 1.9 K/UL
LYMPHOCYTES NFR BLD: 48.2 %
MCH RBC QN AUTO: 28.7 PG
MCHC RBC AUTO-ENTMCNC: 31 G/DL
MCV RBC AUTO: 93 FL
MICROSCOPIC COMMENT: NORMAL
MONOCYTES # BLD AUTO: 0.3 K/UL
MONOCYTES NFR BLD: 7.7 %
NEUTROPHILS # BLD AUTO: 1.6 K/UL
NEUTROPHILS NFR BLD: 42 %
NITRITE UR QL STRIP: NEGATIVE
PH UR STRIP: 6 [PH] (ref 5–8)
PLATELET # BLD AUTO: 263 K/UL
PMV BLD AUTO: 10.5 FL
POTASSIUM SERPL-SCNC: 4.1 MMOL/L
PROT SERPL-MCNC: 7.5 G/DL
PROT UR QL STRIP: NEGATIVE
RBC # BLD AUTO: 4.57 M/UL
RBC #/AREA URNS AUTO: 4 /HPF (ref 0–4)
SODIUM SERPL-SCNC: 143 MMOL/L
SP GR UR STRIP: 1.02 (ref 1–1.03)
SQUAMOUS #/AREA URNS AUTO: 1 /HPF
URN SPEC COLLECT METH UR: ABNORMAL
WBC # BLD AUTO: 3.88 K/UL
WBC #/AREA URNS AUTO: 2 /HPF (ref 0–5)

## 2019-02-27 PROCEDURE — 3008F PR BODY MASS INDEX (BMI) DOCUMENTED: ICD-10-PCS | Mod: CPTII,S$GLB,, | Performed by: NURSE PRACTITIONER

## 2019-02-27 PROCEDURE — 82150 ASSAY OF AMYLASE: CPT

## 2019-02-27 PROCEDURE — 85025 COMPLETE CBC W/AUTO DIFF WBC: CPT

## 2019-02-27 PROCEDURE — 99203 PR OFFICE/OUTPT VISIT, NEW, LEVL III, 30-44 MIN: ICD-10-PCS | Mod: S$GLB,,, | Performed by: NURSE PRACTITIONER

## 2019-02-27 PROCEDURE — 36415 COLL VENOUS BLD VENIPUNCTURE: CPT

## 2019-02-27 PROCEDURE — 99999 PR PBB SHADOW E&M-EST. PATIENT-LVL III: ICD-10-PCS | Mod: PBBFAC,,, | Performed by: NURSE PRACTITIONER

## 2019-02-27 PROCEDURE — 83690 ASSAY OF LIPASE: CPT

## 2019-02-27 PROCEDURE — 99999 PR PBB SHADOW E&M-EST. PATIENT-LVL III: CPT | Mod: PBBFAC,,, | Performed by: NURSE PRACTITIONER

## 2019-02-27 PROCEDURE — 86677 HELICOBACTER PYLORI ANTIBODY: CPT

## 2019-02-27 PROCEDURE — 81001 URINALYSIS AUTO W/SCOPE: CPT

## 2019-02-27 PROCEDURE — 80053 COMPREHEN METABOLIC PANEL: CPT

## 2019-02-27 PROCEDURE — 3008F BODY MASS INDEX DOCD: CPT | Mod: CPTII,S$GLB,, | Performed by: NURSE PRACTITIONER

## 2019-02-27 PROCEDURE — 83036 HEMOGLOBIN GLYCOSYLATED A1C: CPT

## 2019-02-27 PROCEDURE — 99203 OFFICE O/P NEW LOW 30 MIN: CPT | Mod: S$GLB,,, | Performed by: NURSE PRACTITIONER

## 2019-02-27 RX ORDER — OMEPRAZOLE 40 MG/1
40 CAPSULE, DELAYED RELEASE ORAL DAILY
Qty: 30 CAPSULE | Refills: 5 | Status: SHIPPED | OUTPATIENT
Start: 2019-02-27 | End: 2019-05-10

## 2019-02-27 RX ORDER — ONDANSETRON 8 MG/1
8 TABLET, ORALLY DISINTEGRATING ORAL 3 TIMES DAILY PRN
Qty: 30 TABLET | Refills: 0 | Status: SHIPPED | OUTPATIENT
Start: 2019-02-27 | End: 2019-05-10

## 2019-02-27 NOTE — LETTER
February 27, 2019      [facility]  [address]  [facility] [address]  Phone: [phone]  Fax: [phone]       Patient: Carol Collins   YOB: 1965  Date of Visit: 02/27/2019    To Whom It May Concern:    Marlene Collins  was at [facility] on 02/27/2019. She may return to work/school on 02/27/2019 with no restrictions. If you have any questions or concerns, or if I can be of further assistance, please do not hesitate to contact me.    Sincerely,    Dodie Corona DNP

## 2019-02-27 NOTE — PATIENT INSTRUCTIONS
Clear liquid diet, advance to soft diet as tolerated see info below    Avoid laying down after heavy meals    Avoid acidic foods, red sauces, spicy sauces, high carb/fat foots    Start Omeprazole 40mg daily    Zofran as needed for nausea and vomiting    Check labs today, screen for diabetes, will call with results    Follow up in 1 month with one of MDs I  work with who can be your new PCP: Dr. Jazmin Reeves, Dr. Marshall Cannon, Dr. Chiquis Messina are the ones I work closely with    Also follow up with Ben Jackson who you have seen before regarding stress      Clear Liquid Diet    Clear liquids are any liquid that you can see through. They are also very easy to digest. You may be put on a clear liquid diet if you are recovering from irritation or infection of the stomach or intestinal tract. This diet may also be used before surgery or special procedures such as a colonoscopy. You should not be on this diet for more than 3 days. Below are some clear liquids you can have on this diet.  Adults and children over 2 years old  Adults should drink a total of 2 to 3 quarts of liquid per day. It may be easier to drink small frequent servings rather than a few large ones. Liquids can include:  · Fruit juices. Strained orange juice or lemonade (no pulp); apple, grape, and cranberry juice; clear fruit drinks  · Beverages. Sport drinks, sodas, mineral water (plain or flavored), tea, black coffee, liquid gelatin (add twice the recommended amount of water)  · Soups. Clear broth  · Desserts. Plain gelatin, popsicles, fruit juice bars  Children under 2 years old  Oral rehydration fluids are available at drug stores and most grocery stores. You dont need a prescription.  Date Last Reviewed: 8/1/2016  © 6989-0601 [employer]. [address] [place] [address], [place] [address]. All rights reserved. This information is not intended as a substitute for professional medical care. Always follow your healthcare professional's  "instructions.      GERD (Adult)    The esophagus is a tube that carries food from the mouth to the stomach. A valve at the lower end of the esophagus prevents stomach acid from flowing upward. When this valve doesn't work properly, stomach contents may repeatedly flow back up (reflux) into the esophagus. This is called gastroesophageal reflux disease (GERD). GERD can irritate the esophagus. It can cause problems with swallowing or breathing. In severe cases, GERD can cause recurrent pneumonia or other serious problems.  Symptoms of reflux include burning, pressure or sharp pain in the upper abdomen or mid to lower chest. The pain can spread to the neck, back, or shoulder. There may be belching, an acid taste in the back of the throat, chronic cough, or sore throat or hoarseness. GERD symptoms often occur during the day after a big meal. They can also occur at night when lying down.   Home care  Lifestyle changes can help reduce symptoms. If needed, medicines may be prescribed. Symptoms often improve with treatment, but if treatment is stopped, the symptoms often return after a few months. So most persons with GERD will need to continue treatment.  Lifestyle changes  · Limit or avoid fatty, fried, and spicy foods, as well as coffee, chocolate, mint, and foods with high acid content such as tomatoes and citrus fruit and juices (orange, grapefruit, lemon).  · Dont eat large meals, especially at night. Frequent, smaller meals are best. Do not lie down right after eating. And dont eat anything 3 hours before going to bed.  · Avoid drinking alcohol and smoking. As much as possible, stay away from second hand smoke.  · If you are overweight, losing weight will reduce symptoms.   · Avoid wearing tight clothing around your stomach area.  · If your symptoms occur during sleep, use a foam wedge to elevate your upper body (not just your head.) Or, place 4" blocks under the head of your bed.  Medicines  If needed, medicines " can help relieve the symptoms of GERD and prevent damage to the esophagus. Discuss a medicine plan with your healthcare provider. This may include one or more of the following medicines:  · Antacids to help neutralize the normal acids in your stomach.  · Acid blockers (H2 blockers) to decrease acid production.  · Acid inhibitors (PPIs) to decrease acid production in a different way than the blockers. They may work better, but can take a little longer to take effect.  Take an antacid 30-60 minutes after eating and at bedtime, but not at the same time as an acid blocker.  Try not to take medicines such as ibuprofen and aspirin. If you are taking aspirin for your heart or other medical reasons, talk to your healthcare provider about stopping it.  Follow-up care  Follow up with your healthcare provider or as advised by our staff.  When to seek medical advice  Call your healthcare provider if any of the following occur:  · Stomach pain gets worse or moves to the lower right abdomen (appendix area)  · Chest pain appears or gets worse, or spreads to the back, neck, shoulder, or arm  · Frequent vomiting (cant keep down liquids)  · Blood in the stool or vomit (red or black in color)  · Feeling weak or dizzy  · Fever of 100.4ºF (38ºC) or higher, or as directed by your healthcare provider  Date Last Reviewed: 6/23/2015  © 8069-1015 [employer]. [address] [place] [address], [place] [address]. All rights reserved. This information is not intended as a substitute for professional medical care. Always follow your healthcare professional's instructions.

## 2019-02-27 NOTE — PROGRESS NOTES
Subjective:       Patient ID: Carol Collins is a 53 y.o. female.    Chief Complaint: Nausea; Emesis; and Heartburn    Pt new to me, here for complaints of nausea and heartburn for 2 weeks, she attributes this to work relatedstress.     She has a hx of anxiety, followed by behavioral health. Last visit with behavioral health  was 9-6-18. She also saw Cardiology last year for similar complaints, EKG was normal, determined not to be cardiac in nature, no further workup needed, attributed to stress. She has also seen Dr. Hammonds, Endocrine in the past year for postmenopausal symptoms.    I reviewed the ROS info pt entered below.    EGD on 10-23-17 with Dr. Fernandes showed hiatal hernia and Schatzi ring--non-obstructive. Gastrum was normal.    She does report recent weight gain. LBM yesterday.          Heartburn   She complains of belching, heartburn and nausea. She reports no abdominal pain, no chest pain, no choking, no coughing, no dysphagia, no early satiety, no globus sensation, no hoarse voice, no sore throat, no stridor, no tooth decay, no water brash or no wheezing. vomitted 1 day Friday before last. This is a recurrent problem. The current episode started 1 to 4 weeks ago (2 weeks ago). The problem occurs occasionally. The problem has been waxing and waning. The heartburn duration is several minutes. The heartburn is located in the substernum. The heartburn is of moderate intensity. The heartburn wakes her from sleep. The symptoms are aggravated by stress (after she eats she has waves of nausea). Pertinent negatives include no anemia, fatigue, melena, muscle weakness, orthopnea or weight loss. Risk factors include obesity. She has tried an antacid (tried otc pepcid without relief) for the symptoms. The treatment provided mild relief.     Review of Systems   Constitutional: Negative for activity change, fatigue, unexpected weight change and weight loss.   HENT: Negative for hearing loss, hoarse voice,  rhinorrhea, sore throat and trouble swallowing.    Eyes: Negative for discharge and visual disturbance.   Respiratory: Positive for chest tightness. Negative for cough, choking and wheezing.         Related to heartburn complaint   Cardiovascular: Positive for palpitations. Negative for chest pain.        Related to stress per pt   Gastrointestinal: Positive for heartburn, nausea and vomiting. Negative for abdominal distention, abdominal pain, anal bleeding, blood in stool, constipation, diarrhea, dysphagia, melena and rectal pain.        As documented in HPI     Endocrine: Positive for polyuria. Negative for cold intolerance, heat intolerance, polydipsia and polyphagia.        Urinates at least every hour, more frequent recently, denies caffeine, denies increased fluids   Genitourinary: Positive for frequency. Negative for difficulty urinating, dysuria, flank pain, hematuria, menstrual problem and pelvic pain.   Musculoskeletal: Negative for arthralgias, joint swelling, muscle weakness and neck pain.   Skin: Negative for color change, pallor and rash.   Allergic/Immunologic: Negative for environmental allergies, food allergies and immunocompromised state.   Neurological: Negative for weakness and headaches.   Psychiatric/Behavioral: Positive for dysphoric mood. Negative for confusion.        Occasional related to her job    Denies suicidal or homicidal thoughts       Review of patient's allergies indicates:  No Known Allergies    Current Outpatient Medications:     estradiol (ESTRACE) 1 MG tablet, Take 1 mg by mouth once daily., Disp: , Rfl:     Patient Active Problem List   Diagnosis    Gastroesophageal reflux disease without esophagitis    GERD (gastroesophageal reflux disease)    Menopausal symptoms    BMI 36.0-36.9,adult    Heart palpitations    Anxiety    Hiatal hernia    Schatzki's ring     Past Medical History:   Diagnosis Date    GERD (gastroesophageal reflux disease)     Heart palpitations  "2018     Past Surgical History:   Procedure Laterality Date     SECTION, CLASSIC      ESOPHAGOGASTRODUODENOSCOPY (EGD) N/A 10/23/2017    Performed by Pernell Fernandes MD at [facility] ([address] [place] [address])    HYSTERECTOMY       Social History     Socioeconomic History    Marital status:      Spouse name: None    Number of children: None    Years of education: None    Highest education level: None   Social Needs    Financial resource strain: None    Food insecurity - worry: None    Food insecurity - inability: None    Transportation needs - medical: None    Transportation needs - non-medical: None   Occupational History    None   Tobacco Use    Smoking status: Never Smoker    Smokeless tobacco: Never Used   Substance and Sexual Activity    Alcohol use: Yes     Comment: rarely    Drug use: No    Sexual activity: Yes     Partners: Male   Other Topics Concern    None   Social History Narrative    None     Family History   Problem Relation Age of Onset    Hypertension Mother     Diabetes Mother     Ovarian cancer Mother     Hypertension Father     Breast cancer Sister     Hypertension Sister     Diabetes Sister     Hypertension Brother     Breast cancer Sister     Hypertension Sister     Diabetes Sister     Diabetes Sister     Stomach cancer Neg Hx     Colon cancer Neg Hx        Objective:       Vitals:    19 0807   BP: 116/84   Pulse: 67   SpO2: 99%   Weight: 103.1 kg (227 lb 4.7 oz)   Height: 5' 5" (1.651 m)   PainSc: 0-No pain       Body mass index is 37.82 kg/m².    Physical Exam   Constitutional: She is oriented to person, place, and time. Vital signs are normal. She appears well-developed and well-nourished.   obese   HENT:   Head: Normocephalic.   Right Ear: Hearing, tympanic membrane, external ear and ear canal normal.   Left Ear: Hearing, tympanic membrane, external ear and ear canal normal.   Eyes: Conjunctivae, EOM and lids are normal. Pupils are equal, round, " and reactive to light. Lids are everted and swept, no foreign bodies found.   Neck: Trachea normal, normal range of motion and full passive range of motion without pain. Neck supple. No JVD present. Carotid bruit is not present.   Cardiovascular: Normal rate, regular rhythm, S1 normal, S2 normal, normal heart sounds, intact distal pulses and normal pulses.   Pulmonary/Chest: Effort normal and breath sounds normal.   Abdominal: Soft. Normal appearance and bowel sounds are normal. There is no hepatosplenomegaly.   obese   Musculoskeletal: Normal range of motion.   Neurological: She is alert and oriented to person, place, and time. She has normal strength and normal reflexes.   Skin: Skin is warm, dry and intact. Capillary refill takes less than 2 seconds.   Psychiatric: She has a normal mood and affect. Her speech is normal and behavior is normal. Judgment and thought content normal. Cognition and memory are normal.   Nursing note and vitals reviewed.      Assessment:       1. Gastroesophageal reflux disease without esophagitis    2. BMI 37.0-37.9, adult    3. Obesity (BMI 35.0-39.9 without comorbidity)    4. Urinary frequency    5. Family history of diabetes mellitus in mother    6. Non-intractable vomiting with nausea, unspecified vomiting type        Plan:       Carol was seen today for nausea, emesis and heartburn.    Diagnoses and all orders for this visit:    Gastroesophageal reflux disease without esophagitis  -     CBC auto differential; Future  -     Comprehensive metabolic panel; Future  -     Amylase; Future  -     Lipase; Future  -     H.Pylori Antibody IgG; Future  -     omeprazole (PRILOSEC) 40 MG capsule; Take 1 capsule (40 mg total) by mouth once daily.    BMI 37.0-37.9, adult  BMI reviewed    Obesity (BMI 35.0-39.9 without comorbidity)  BMI reviewed.    Diet and exercise to lose weight.    Urinary frequency  -     Comprehensive metabolic panel; Future  -     URINALYSIS    Family history of diabetes  mellitus in mother  -     Comprehensive metabolic panel; Future  -     URINALYSIS  -     Hemoglobin A1c; Future    Non-intractable vomiting with nausea, unspecified vomiting type  -     ondansetron (ZOFRAN-ODT) 8 MG TbDL; Take 1 tablet (8 mg total) by mouth 3 (three) times daily as needed (nausea).    Clear liquid diet, advance to soft diet as tolerated see info below    Avoid laying down after heavy meals    Avoid acidic foods, red sauces, spicy sauces, high carb/fat foots    Start Omeprazole 40mg daily    Zofran as needed for nausea and vomiting    Check labs today, screen for diabetes, will call with results    Follow up in 1 month with one of MDs I  work with who can be your new PCP: Dr. Jazmin Reeves, Dr. Marshall Cannon, Dr. Chiquis Messina are the ones I work closely with    Also follow up with Ben Jackson who you have seen before regarding stress    Self care instructions provided in AVS    Follow-up in about 1 month (around 3/27/2019), or with one of MDs recommended to establish care and followup.

## 2019-02-28 LAB — H PYLORI IGG SERPL QL IA: NEGATIVE

## 2019-03-01 NOTE — PROGRESS NOTES
Labs stable, ulcer test negative. A1C 5.7, prediabetic,  Follow low carb, low fat, high fiber diet and exercise to prevent the development of T2DM

## 2019-05-10 ENCOUNTER — OFFICE VISIT (OUTPATIENT)
Dept: INTERNAL MEDICINE | Facility: CLINIC | Age: 54
End: 2019-05-10
Payer: COMMERCIAL

## 2019-05-10 VITALS
BODY MASS INDEX: 36.65 KG/M2 | WEIGHT: 220 LBS | HEIGHT: 65 IN | DIASTOLIC BLOOD PRESSURE: 70 MMHG | SYSTOLIC BLOOD PRESSURE: 130 MMHG

## 2019-05-10 DIAGNOSIS — Z00.00 HEALTH CARE MAINTENANCE: Primary | ICD-10-CM

## 2019-05-10 DIAGNOSIS — F41.9 ANXIETY: ICD-10-CM

## 2019-05-10 DIAGNOSIS — Z12.83 SKIN EXAM, SCREENING FOR CANCER: ICD-10-CM

## 2019-05-10 DIAGNOSIS — K21.9 GASTROESOPHAGEAL REFLUX DISEASE WITHOUT ESOPHAGITIS: ICD-10-CM

## 2019-05-10 DIAGNOSIS — Z12.31 ENCOUNTER FOR SCREENING MAMMOGRAM FOR BREAST CANCER: ICD-10-CM

## 2019-05-10 DIAGNOSIS — H04.129 DRY EYE: ICD-10-CM

## 2019-05-10 DIAGNOSIS — Z23 NEED FOR TETANUS BOOSTER: ICD-10-CM

## 2019-05-10 DIAGNOSIS — Z97.3 WEARS CONTACT LENSES: ICD-10-CM

## 2019-05-10 PROCEDURE — 99396 PREV VISIT EST AGE 40-64: CPT | Mod: 25,S$GLB,, | Performed by: INTERNAL MEDICINE

## 2019-05-10 PROCEDURE — 90715 TDAP VACCINE GREATER THAN OR EQUAL TO 7YO IM: ICD-10-PCS | Mod: S$GLB,,, | Performed by: INTERNAL MEDICINE

## 2019-05-10 PROCEDURE — 90715 TDAP VACCINE 7 YRS/> IM: CPT | Mod: S$GLB,,, | Performed by: INTERNAL MEDICINE

## 2019-05-10 PROCEDURE — 90471 IMMUNIZATION ADMIN: CPT | Mod: S$GLB,,, | Performed by: INTERNAL MEDICINE

## 2019-05-10 PROCEDURE — 99999 PR PBB SHADOW E&M-EST. PATIENT-LVL IV: ICD-10-PCS | Mod: PBBFAC,,, | Performed by: INTERNAL MEDICINE

## 2019-05-10 PROCEDURE — 99396 PR PREVENTIVE VISIT,EST,40-64: ICD-10-PCS | Mod: 25,S$GLB,, | Performed by: INTERNAL MEDICINE

## 2019-05-10 PROCEDURE — 90471 TDAP VACCINE GREATER THAN OR EQUAL TO 7YO IM: ICD-10-PCS | Mod: S$GLB,,, | Performed by: INTERNAL MEDICINE

## 2019-05-10 PROCEDURE — 99999 PR PBB SHADOW E&M-EST. PATIENT-LVL IV: CPT | Mod: PBBFAC,,, | Performed by: INTERNAL MEDICINE

## 2019-05-10 RX ORDER — OMEPRAZOLE 40 MG/1
40 CAPSULE, DELAYED RELEASE ORAL DAILY
Qty: 30 CAPSULE | Refills: 5 | COMMUNITY
Start: 2019-05-10 | End: 2019-07-19

## 2019-05-10 NOTE — PROGRESS NOTES
"Subjective:       Patient ID: Carol Collins is a 54 y.o. female.    Chief Complaint: Establish Care (est care as a new patient. patient is inquiring about MMR, Hepatitis C screenings and shingrix vaccines.) and Dry Eye (has dry eyes, especially when driving.)    HPI   Carol Collins is a 54 y.o. female here for a yearly preventative healthcare visit.     EGD on 10-23-17 with Dr. Fernandes showed hiatal hernia and Schatzi ring--non-obstructive. Gastrum was normal.  GERD  rx ompeprazole 40mg in February. Only taking as needed and hasn't needed.  Watching diet and avoiding tomatoes, red wine has helped.     Prediabetes  a1c 5.7%    Itching in middle of back. No rash. Few months. Scratching with back scratcher.     New moles on abdomen.     Her gynecologist is outside of [facility]. Dr. Anastasia Mallory  Post hysterectomy; ovaries remain.  Strong family hx of breast and ovarian cancer.  mmg yearly. Document from 2015 to present all benign. Last 6/29/18.  [facility] Will start doing mammograms here and wants to switch gyn care.     PAP 4/27/18 negative  Review of Systems   Constitutional: Negative for fever.   HENT: Negative.    Eyes: Negative.    Respiratory: Negative for shortness of breath.    Cardiovascular: Negative for chest pain and leg swelling.   Gastrointestinal: Negative for abdominal pain, diarrhea, nausea and vomiting.   Genitourinary: Negative.    Musculoskeletal: Negative for arthralgias.   Skin: Negative for rash.   Psychiatric/Behavioral: Negative.        Objective:   /70 (BP Location: Left arm, Patient Position: Sitting, BP Method: Large (Manual))   Ht 5' 5" (1.651 m)   Wt 99.8 kg (220 lb)   BMI 36.61 kg/m²      Physical Exam   Skin:   Upper back with dry skin but no rash appreciated  Typical appearing nevi on face and abdomen. There is one slightly atypically shaped dark nevus on her RUQ about 2mm.       Assessment:       1. Health care maintenance    2. Anxiety    3. BMI " 36.0-36.9,adult    4. Gastroesophageal reflux disease without esophagitis    5. Skin exam, screening for cancer    6. Wears contact lenses    7. Dry eye    8. Encounter for screening mammogram for breast cancer    9. Need for tetanus booster        Plan:       Carol was seen today for establish care and dry eye.    Diagnoses and all orders for this visit:    Health care maintenance  -     Lipid panel; Future  -     Hepatitis C antibody; Future  -     Ambulatory referral to Obstetrics / Gynecology  -     Vitamin D; Future  -     Rubella antibody, IgG; Future  -     Rubeola antibody IgG; Future  -     Mumps, IgG Screen; Future  -     Hepatitis B Surface Antibody, Qual/Quant; Future  -     Varicella zoster antibody, IgG; Future    Anxiety  Doing well    BMI 36.0-36.9,adult    Gastroesophageal reflux disease without esophagitis  Doing well with diet changes  Has not needed medicine, advised can do course of PPI PRN    Skin exam, screening for cancer  -     Ambulatory Referral to Dermatology    Wears contact lenses  -     Ambulatory consult to Optometry    Dry eye  -     Ambulatory consult to Optometry    Encounter for screening mammogram for breast cancer  -     Mammo Digital Screening Bilat w/ [facility]; Future    Need for tetanus booster  -     (In Office Administered) Tdap Vaccine

## 2019-05-10 NOTE — PATIENT INSTRUCTIONS
aquaphor ointment to itchy area, avoid scratching    GYN: Dr. Paez, Dr. Stuart, Dr. Ray    Derm: Dr. Tereza Saxena    Optometry: Dr. Cueva

## 2019-05-15 ENCOUNTER — PATIENT MESSAGE (OUTPATIENT)
Dept: INTERNAL MEDICINE | Facility: CLINIC | Age: 54
End: 2019-05-15

## 2019-05-16 ENCOUNTER — LAB VISIT (OUTPATIENT)
Dept: LAB | Facility: HOSPITAL | Age: 54
End: 2019-05-16
Attending: INTERNAL MEDICINE
Payer: COMMERCIAL

## 2019-05-16 DIAGNOSIS — Z00.00 HEALTH CARE MAINTENANCE: ICD-10-CM

## 2019-05-16 LAB
25(OH)D3+25(OH)D2 SERPL-MCNC: 17 NG/ML (ref 30–96)
CHOLEST SERPL-MCNC: 210 MG/DL (ref 120–199)
CHOLEST/HDLC SERPL: 3.9 {RATIO} (ref 2–5)
HCV AB SERPL QL IA: NEGATIVE
HDLC SERPL-MCNC: 54 MG/DL (ref 40–75)
HDLC SERPL: 25.7 % (ref 20–50)
LDLC SERPL CALC-MCNC: 140.6 MG/DL (ref 63–159)
NONHDLC SERPL-MCNC: 156 MG/DL
TRIGL SERPL-MCNC: 77 MG/DL (ref 30–150)

## 2019-05-16 PROCEDURE — 86762 RUBELLA ANTIBODY: CPT

## 2019-05-16 PROCEDURE — 86735 MUMPS ANTIBODY: CPT

## 2019-05-16 PROCEDURE — 86803 HEPATITIS C AB TEST: CPT

## 2019-05-16 PROCEDURE — 82306 VITAMIN D 25 HYDROXY: CPT

## 2019-05-16 PROCEDURE — 86765 RUBEOLA ANTIBODY: CPT

## 2019-05-16 PROCEDURE — 80061 LIPID PANEL: CPT

## 2019-05-16 PROCEDURE — 86787 VARICELLA-ZOSTER ANTIBODY: CPT

## 2019-05-16 PROCEDURE — 86706 HEP B SURFACE ANTIBODY: CPT

## 2019-05-17 LAB
RUBV IGG SER-ACNC: 23.4 IU/ML
RUBV IGG SER-IMP: REACTIVE

## 2019-05-18 LAB
MUMPS IGG INTERPRETATION: POSITIVE
MUMPS IGG SCREEN: 2.02 ISR (ref 0–0.9)
RUBEOLA IGG ANTIBODY: 1.66 ISR (ref 0–0.9)
RUBEOLA INTERPRETATION: POSITIVE
VARICELLA INTERPRETATION: POSITIVE
VARICELLA ZOSTER IGG: 2.86 ISR (ref 0–0.9)

## 2019-05-20 LAB
HBV SURFACE AB SER QL IA: NEGATIVE
HBV SURFACE AB SERPL IA-ACNC: <3 MIU/ML

## 2019-07-01 ENCOUNTER — HOSPITAL ENCOUNTER (OUTPATIENT)
Dept: RADIOLOGY | Facility: HOSPITAL | Age: 54
Discharge: HOME | End: 2019-07-01
Attending: INTERNAL MEDICINE
Payer: COMMERCIAL

## 2019-07-01 DIAGNOSIS — Z12.31 ENCOUNTER FOR SCREENING MAMMOGRAM FOR BREAST CANCER: ICD-10-CM

## 2019-07-01 PROCEDURE — 77063 BREAST TOMOSYNTHESIS BI: CPT | Mod: 26,,, | Performed by: RADIOLOGY

## 2019-07-01 PROCEDURE — 77067 MAMMO DIGITAL SCREENING BILAT WITH TOMOSYNTHESIS_CAD: ICD-10-PCS | Mod: 26,,, | Performed by: RADIOLOGY

## 2019-07-01 PROCEDURE — 77067 SCR MAMMO BI INCL CAD: CPT | Mod: 26,,, | Performed by: RADIOLOGY

## 2019-07-01 PROCEDURE — 77067 SCR MAMMO BI INCL CAD: CPT | Mod: TC

## 2019-07-01 PROCEDURE — 77063 MAMMO DIGITAL SCREENING BILAT WITH TOMOSYNTHESIS_CAD: ICD-10-PCS | Mod: 26,,, | Performed by: RADIOLOGY

## 2019-07-08 ENCOUNTER — PATIENT MESSAGE (OUTPATIENT)
Dept: INTERNAL MEDICINE | Facility: CLINIC | Age: 54
End: 2019-07-08

## 2019-07-09 ENCOUNTER — PATIENT OUTREACH (OUTPATIENT)
Dept: ADMINISTRATIVE | Facility: HOSPITAL | Age: 54
End: 2019-07-09

## 2019-07-09 NOTE — TELEPHONE ENCOUNTER
Can you advise patient regarding the additional imaging requested and if further imaging is needed?

## 2019-07-17 ENCOUNTER — TELEPHONE (OUTPATIENT)
Dept: RADIOLOGY | Facility: HOSPITAL | Age: 54
End: 2019-07-17

## 2019-07-17 NOTE — TELEPHONE ENCOUNTER
----- Message from Natasha Mcbride sent at 7/17/2019 10:31 AM CDT -----  Contact: Pt.Self   Patient Requesting Sooner Appointment.     Reason for sooner appt.:  recall imaging     When is the first available appointment?  no order in epic @ time of call     Communication Preference:  [phone]    Additional Information:    Thank You

## 2019-07-19 ENCOUNTER — OFFICE VISIT (OUTPATIENT)
Dept: PRIMARY CARE CLINIC | Facility: CLINIC | Age: 54
End: 2019-07-19
Attending: NURSE PRACTITIONER
Payer: COMMERCIAL

## 2019-07-19 VITALS
HEIGHT: 65 IN | TEMPERATURE: 99 F | HEART RATE: 75 BPM | OXYGEN SATURATION: 96 % | WEIGHT: 223.75 LBS | RESPIRATION RATE: 18 BRPM | SYSTOLIC BLOOD PRESSURE: 138 MMHG | DIASTOLIC BLOOD PRESSURE: 68 MMHG | BODY MASS INDEX: 37.28 KG/M2

## 2019-07-19 DIAGNOSIS — R00.2 INTERMITTENT PALPITATIONS: ICD-10-CM

## 2019-07-19 DIAGNOSIS — R51.9 NONINTRACTABLE HEADACHE, UNSPECIFIED CHRONICITY PATTERN, UNSPECIFIED HEADACHE TYPE: ICD-10-CM

## 2019-07-19 DIAGNOSIS — H53.9 CHANGES IN VISION: ICD-10-CM

## 2019-07-19 DIAGNOSIS — R68.84 JAW PAIN: ICD-10-CM

## 2019-07-19 DIAGNOSIS — R42 DIZZINESS: Primary | ICD-10-CM

## 2019-07-19 PROBLEM — Z79.890 HORMONE REPLACEMENT THERAPY (HRT): Status: ACTIVE | Noted: 2019-07-19

## 2019-07-19 PROCEDURE — 93005 EKG 12-LEAD: ICD-10-PCS | Mod: S$GLB,,, | Performed by: NURSE PRACTITIONER

## 2019-07-19 PROCEDURE — 99203 PR OFFICE/OUTPT VISIT, NEW, LEVL III, 30-44 MIN: ICD-10-PCS | Mod: S$GLB,,, | Performed by: NURSE PRACTITIONER

## 2019-07-19 PROCEDURE — 93010 EKG 12-LEAD: ICD-10-PCS | Mod: S$GLB,,, | Performed by: INTERNAL MEDICINE

## 2019-07-19 PROCEDURE — 93005 ELECTROCARDIOGRAM TRACING: CPT | Mod: S$GLB,,, | Performed by: NURSE PRACTITIONER

## 2019-07-19 PROCEDURE — 99999 PR PBB SHADOW E&M-EST. PATIENT-LVL III: ICD-10-PCS | Mod: PBBFAC,,, | Performed by: NURSE PRACTITIONER

## 2019-07-19 PROCEDURE — 93010 ELECTROCARDIOGRAM REPORT: CPT | Mod: S$GLB,,, | Performed by: INTERNAL MEDICINE

## 2019-07-19 PROCEDURE — 3008F PR BODY MASS INDEX (BMI) DOCUMENTED: ICD-10-PCS | Mod: CPTII,S$GLB,, | Performed by: NURSE PRACTITIONER

## 2019-07-19 PROCEDURE — 99203 OFFICE O/P NEW LOW 30 MIN: CPT | Mod: S$GLB,,, | Performed by: NURSE PRACTITIONER

## 2019-07-19 PROCEDURE — 99999 PR PBB SHADOW E&M-EST. PATIENT-LVL III: CPT | Mod: PBBFAC,,, | Performed by: NURSE PRACTITIONER

## 2019-07-19 PROCEDURE — 3008F BODY MASS INDEX DOCD: CPT | Mod: CPTII,S$GLB,, | Performed by: NURSE PRACTITIONER

## 2019-07-19 NOTE — PROGRESS NOTES
Subjective:       Patient ID: Carol Collins is a 54 y.o. female.    Chief Complaint: Headache (headache for several days); Dizziness; and Jaw Pain    Patient is a 54-year-old female who is new to me who presents to clinic with acute complaints of headache, dizziness, vision changes, and right jaw pain. She reports she began experiencing headache on Wednesday.  The headache is aching and throbbing on the top of her head.  She has not taken any home remedy or over-the-counter medication for relief of headache.  The headache does not radiate.  She has no history of headache or family history of headache.  No modifying or alleviating factors related to the headache.  Her headache has been ongoing without change and she started to develop dizziness last night and into today.  She states when she was walking down the dietz today she began to experience vision changes like the walls were closing in and she also awoke with right jaw pain. Jaw pain radiates from her temple down to her chin.  It does increase with opening her mouth and firm palpation.  Denies any history of TMJ.  She denies any photophobia, limb weakness, nausea, vomiting, diarrhea, abdominal pain, chest pain, shortness of breath, fever, congestion. She does report in the last week she has lost a partial filling right upper molar.  She was evaluated by the dentist and he reportedly stated that the tooth was fine.  She denies any dental pain.     Patient does have a known history of hormone replacement therapy, anxiety, palpitations, and GERD.  Her primary care provider is Dr. Chiquis Messina.  She denied any other acute symptoms or complaints in clinic today.    Review of Systems   Constitutional: Positive for fatigue. Negative for chills, diaphoresis and fever.   HENT: Negative for congestion, dental problem, ear pain, facial swelling, hearing loss, sinus pressure, sore throat, tinnitus, trouble swallowing and voice change.    Eyes: Positive for visual  disturbance. Negative for photophobia, pain, discharge and itching.   Respiratory: Negative for choking, chest tightness, shortness of breath and wheezing.    Cardiovascular: Positive for palpitations (History of palpitations but patient denies any palpitations with her headache and dizziness). Negative for chest pain and leg swelling.   Gastrointestinal: Negative.  Negative for abdominal pain, blood in stool, diarrhea, nausea and vomiting.   Endocrine: Negative.    Genitourinary: Negative.    Musculoskeletal: Negative.  Negative for arthralgias, gait problem and neck pain.   Skin: Negative for pallor and rash.   Allergic/Immunologic: Negative.    Neurological: Positive for dizziness, weakness, light-headedness and headaches.   Hematological: Negative.    Psychiatric/Behavioral: The patient is nervous/anxious.    All other systems reviewed and are negative.      Objective:      Physical Exam   Constitutional: She is oriented to person, place, and time. She appears well-developed and well-nourished. No distress.   HENT:   Head: Normocephalic and atraumatic.   Right Ear: External ear normal.   Left Ear: External ear normal.   Nose: Nose normal.   Mouth/Throat: Oropharynx is clear and moist. No oropharyngeal exudate.   Eyes: Pupils are equal, round, and reactive to light. Conjunctivae and EOM are normal. Right eye exhibits no discharge. Left eye exhibits no discharge. No scleral icterus.   Neck: Normal range of motion. Neck supple. No thyromegaly present.   Cardiovascular: Normal rate, normal heart sounds and intact distal pulses. Exam reveals no gallop and no friction rub.   No murmur heard.  While examining the patient she complained of a wave feeling funny upon auscultation of her chest her heart rate was 110 apically it was rechecked after her episode of dizziness and was counted at 71.   Abdominal: Soft. She exhibits no distension.   Musculoskeletal: Normal range of motion. She exhibits no edema.    Lymphadenopathy:     She has no cervical adenopathy.   Neurological: She is alert and oriented to person, place, and time. She has normal strength. She displays normal reflexes. No cranial nerve deficit. Coordination and gait normal. GCS eye subscore is 4. GCS verbal subscore is 5. GCS motor subscore is 6.   Positive Romberg with eyes closed   Skin: Skin is warm and dry. Capillary refill takes less than 2 seconds. She is not diaphoretic.   Psychiatric: She has a normal mood and affect. Her behavior is normal. Thought content normal.   Nursing note and vitals reviewed.      Medication List with Changes/Refills   Current Medications    ESTRADIOL (ESTRACE) 1 MG TABLET    Take 1 mg by mouth once daily.    OMEPRAZOLE (PRILOSEC) 40 MG CAPSULE    Take 1 capsule (40 mg total) by mouth once daily. Take 2 week course PO daily before breakfast if needed for reflux symptoms       Assessment:       1. Dizziness    2. Nonintractable headache, unspecified chronicity pattern, unspecified headache type    3. Intermittent palpitations    4. Changes in vision    5. Jaw pain        Plan:       Dizziness  -     IN OFFICE EKG 12-LEAD (to [place])  -     Refer to Emergency Dept.  EKG reveals normal sinus rhythm with normal intervals.  No acute ST changes.  EKG will be sent to Cardiology for final interpretation.  Given patient's ongoing symptoms and her positive neurological exam it was determined the patient should be evaluated today to include a CT scan and labs.  The patient wanted to drive herself to the emergency department however she was instructed that she should be taken directly to the ED by a friend or a co-worker however she did not have anyone to take her and given her dizziness I did not feel it was appropriate for her to drive herself.  An ambulance was called to transport the patient to the emergency department.  She left the clinic in stable condition with medics on a stretcher.  Patient will follow up with her primary  care provider post hospital evaluation.    Nonintractable headache, unspecified chronicity pattern, unspecified headache type  -     Refer to Emergency Dept.    Intermittent palpitations  -     Refer to Emergency Dept.    Changes in vision  -     Refer to Emergency Dept.    Jaw pain  -     Refer to Emergency Dept.  Patient to follow up with her healthcare team post emergency Department evaluation.      I have reviewed the patient's past medical/surgical and social histories and updated as appropriate. Medications were reviewed and discussed as appropriate including side effects and risks versus benefit. Plan of care was reviewed and agreed upon with the patient.  An opportunity to ask questions was provided and explanation given. Patient verbalized understanding on all information reviewed and discussed.

## 2019-07-20 ENCOUNTER — PATIENT MESSAGE (OUTPATIENT)
Dept: INTERNAL MEDICINE | Facility: CLINIC | Age: 54
End: 2019-07-20

## 2019-07-20 NOTE — TELEPHONE ENCOUNTER
If can wait til Monday OK to overbook at 9am. If she feels she needs to be seen over weekend please direct to [facility] urgent care.

## 2019-07-22 ENCOUNTER — OFFICE VISIT (OUTPATIENT)
Dept: INTERNAL MEDICINE | Facility: CLINIC | Age: 54
End: 2019-07-22
Payer: COMMERCIAL

## 2019-07-22 VITALS
BODY MASS INDEX: 37.82 KG/M2 | HEIGHT: 65 IN | WEIGHT: 227 LBS | SYSTOLIC BLOOD PRESSURE: 142 MMHG | DIASTOLIC BLOOD PRESSURE: 90 MMHG

## 2019-07-22 DIAGNOSIS — R03.0 ELEVATED BLOOD-PRESSURE READING WITHOUT DIAGNOSIS OF HYPERTENSION: Primary | ICD-10-CM

## 2019-07-22 PROCEDURE — 99214 PR OFFICE/OUTPT VISIT, EST, LEVL IV, 30-39 MIN: ICD-10-PCS | Mod: S$GLB,,, | Performed by: INTERNAL MEDICINE

## 2019-07-22 PROCEDURE — 99214 OFFICE O/P EST MOD 30 MIN: CPT | Mod: S$GLB,,, | Performed by: INTERNAL MEDICINE

## 2019-07-22 PROCEDURE — 99999 PR PBB SHADOW E&M-EST. PATIENT-LVL III: ICD-10-PCS | Mod: PBBFAC,,, | Performed by: INTERNAL MEDICINE

## 2019-07-22 PROCEDURE — 3008F PR BODY MASS INDEX (BMI) DOCUMENTED: ICD-10-PCS | Mod: CPTII,S$GLB,, | Performed by: INTERNAL MEDICINE

## 2019-07-22 PROCEDURE — 99999 PR PBB SHADOW E&M-EST. PATIENT-LVL III: CPT | Mod: PBBFAC,,, | Performed by: INTERNAL MEDICINE

## 2019-07-22 PROCEDURE — 3008F BODY MASS INDEX DOCD: CPT | Mod: CPTII,S$GLB,, | Performed by: INTERNAL MEDICINE

## 2019-07-22 RX ORDER — AMLODIPINE BESYLATE 5 MG/1
5 TABLET ORAL DAILY
Qty: 30 TABLET | Refills: 2 | Status: SHIPPED | OUTPATIENT
Start: 2019-07-22 | End: 2019-10-17 | Stop reason: SDUPTHER

## 2019-07-22 NOTE — PROGRESS NOTES
"Subjective:       Patient ID: Carol Collins is a 54 y.o. female.    Chief Complaint: Follow-up (patient was seen in [facility](escorted by ambulance) on Fri, had been experiencing dizziness (recurrent fro the day) and tendency/high risk to fall.); Headache (persists for 3 days, patient was prescribed Rx Fiorcet w/ Acetaminophen.  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 )    HPI     ED visit on 7/19 with dizziness, headache and elevated BP.   Visit in May with /70.  Was sent from  after positive Romberg.     Not on any BP meds  Estradiol  fioricet prn  Atarax  Meclizine    She saw cardiology in May 18 with palpitations. NSR. No further work up.    Headaches started on Wednesday after friend's son killed in MVC.     Few years ago at work had reading of 138/80 but otherwise BP has been normal.     Recently requested transfer due to issues at work.    Still some dizziness, thinks it may be her contacts.     Last headache yesterday which was brief and mild.     Review of Systems   Constitutional: Negative for fever.   Respiratory: Negative for shortness of breath.    Cardiovascular: Negative for chest pain.   Musculoskeletal: Negative.    Skin: Negative.        Objective:   BP (!) 142/90   Ht 5' 5" (1.651 m)   Wt 103 kg (227 lb)   BMI 37.77 kg/m²      Physical Exam   Constitutional: She is oriented to person, place, and time. She appears well-developed and well-nourished. No distress.   HENT:   Head: Normocephalic and atraumatic.   Cardiovascular: Normal rate and regular rhythm.   Pulmonary/Chest: Effort normal. No respiratory distress. She has no wheezes. She has no rales.   Neurological: She is alert and oriented to person, place, and time.   Skin: Skin is warm and dry. She is not diaphoretic.   Psychiatric: She has a normal mood and affect. Her behavior is normal.       Assessment:       1. " Elevated blood-pressure reading without diagnosis of hypertension        Plan:       Carol was seen today for follow-up, headache and hypertension.    Diagnoses and all orders for this visit:    Elevated blood-pressure reading without diagnosis of hypertension  Significantly increased stress  -     Start amLODIPine (NORVASC) 5 MG tablet; Take 1 tablet (5 mg total) by mouth once daily.   Follow up 2 weeks

## 2019-07-29 ENCOUNTER — PATIENT MESSAGE (OUTPATIENT)
Dept: INTERNAL MEDICINE | Facility: CLINIC | Age: 54
End: 2019-07-29

## 2019-07-30 ENCOUNTER — PATIENT MESSAGE (OUTPATIENT)
Dept: INTERNAL MEDICINE | Facility: CLINIC | Age: 54
End: 2019-07-30

## 2019-07-30 DIAGNOSIS — Z23 NEED FOR HEPATITIS B VACCINATION: Primary | ICD-10-CM

## 2019-08-09 ENCOUNTER — CLINICAL SUPPORT (OUTPATIENT)
Dept: INFECTIOUS DISEASES | Facility: CLINIC | Age: 54
End: 2019-08-09
Payer: COMMERCIAL

## 2019-08-09 ENCOUNTER — OFFICE VISIT (OUTPATIENT)
Dept: INTERNAL MEDICINE | Facility: CLINIC | Age: 54
End: 2019-08-09
Payer: COMMERCIAL

## 2019-08-09 VITALS
WEIGHT: 222.69 LBS | HEIGHT: 65 IN | BODY MASS INDEX: 37.1 KG/M2 | HEART RATE: 73 BPM | DIASTOLIC BLOOD PRESSURE: 78 MMHG | OXYGEN SATURATION: 99 % | SYSTOLIC BLOOD PRESSURE: 122 MMHG

## 2019-08-09 DIAGNOSIS — Z23 NEED FOR HEPATITIS B VACCINATION: ICD-10-CM

## 2019-08-09 DIAGNOSIS — I10 ESSENTIAL HYPERTENSION: ICD-10-CM

## 2019-08-09 PROCEDURE — 90746 HEPB VACCINE 3 DOSE ADULT IM: CPT | Mod: S$GLB,,, | Performed by: INTERNAL MEDICINE

## 2019-08-09 PROCEDURE — 99213 OFFICE O/P EST LOW 20 MIN: CPT | Mod: 25,S$GLB,, | Performed by: INTERNAL MEDICINE

## 2019-08-09 PROCEDURE — 3008F PR BODY MASS INDEX (BMI) DOCUMENTED: ICD-10-PCS | Mod: CPTII,S$GLB,, | Performed by: INTERNAL MEDICINE

## 2019-08-09 PROCEDURE — 99999 PR PBB SHADOW E&M-EST. PATIENT-LVL IV: ICD-10-PCS | Mod: PBBFAC,,, | Performed by: INTERNAL MEDICINE

## 2019-08-09 PROCEDURE — 90471 IMMUNIZATION ADMIN: CPT | Mod: S$GLB,,, | Performed by: INTERNAL MEDICINE

## 2019-08-09 PROCEDURE — 90471 HEPATITIS B VACCINE ADULT IM: ICD-10-PCS | Mod: S$GLB,,, | Performed by: INTERNAL MEDICINE

## 2019-08-09 PROCEDURE — 99999 PR PBB SHADOW E&M-EST. PATIENT-LVL IV: CPT | Mod: PBBFAC,,, | Performed by: INTERNAL MEDICINE

## 2019-08-09 PROCEDURE — 90746 HEPATITIS B VACCINE ADULT IM: ICD-10-PCS | Mod: S$GLB,,, | Performed by: INTERNAL MEDICINE

## 2019-08-09 PROCEDURE — 99213 PR OFFICE/OUTPT VISIT, EST, LEVL III, 20-29 MIN: ICD-10-PCS | Mod: 25,S$GLB,, | Performed by: INTERNAL MEDICINE

## 2019-08-09 PROCEDURE — 3008F BODY MASS INDEX DOCD: CPT | Mod: CPTII,S$GLB,, | Performed by: INTERNAL MEDICINE

## 2019-08-09 NOTE — PROGRESS NOTES
"Subjective:       Patient ID: Carol Collins is a 54 y.o. female.    Chief Complaint: Follow-up    Hypertension   This is a new problem. The current episode started 1 to 4 weeks ago. The problem has been waxing and waning since onset. The problem is resistant. Associated symptoms include anxiety and malaise/fatigue. Pertinent negatives include no blurred vision, chest pain, headaches, neck pain, orthopnea, palpitations, peripheral edema, PND, shortness of breath or sweats. Agents associated with hypertension include no associated agents and estrogens. Risk factors for coronary artery disease include obesity, post-menopausal state, sedentary lifestyle and stress. Past treatments include nothing. The current treatment provides mild improvement. Compliance problems include exercise.       53 yo F here for follow up of HTN, carbuncle right groin present on 7/29.  Amlodipine 5mg started on 7/22.      Review of Systems   Constitutional: Positive for malaise/fatigue.   Eyes: Negative for blurred vision.   Respiratory: Negative for shortness of breath.    Cardiovascular: Negative for chest pain, palpitations, orthopnea and PND.   Musculoskeletal: Negative for neck pain.   Neurological: Negative for headaches.       Objective:   /78   Pulse 73   Ht 5' 5" (1.651 m)   Wt 101 kg (222 lb 10.6 oz)   SpO2 99%   BMI 37.05 kg/m²      Physical Exam   Constitutional: She is oriented to person, place, and time. She appears well-developed and well-nourished. No distress.   HENT:   Head: Normocephalic and atraumatic.   Cardiovascular: Normal rate and regular rhythm.   Pulmonary/Chest: Effort normal. No respiratory distress. She has no wheezes. She has no rales.   Neurological: She is alert and oriented to person, place, and time.   Skin: Skin is warm and dry. She is not diaphoretic.   Psychiatric: She has a normal mood and affect. Her behavior is normal.     mons pubis with right sided 1cm area of induration, no fluctuance, " no skin abnormality    Assessment:       1. Essential hypertension        Plan:       Carol was seen today for follow-up.    Diagnoses and all orders for this visit:    Essential hypertension  Doing well on amlodipine 5mg  Discussed that with weight loss, low sodiuim diet, stress reduction may be able to stop in the future. Continue to monitor BP

## 2019-09-07 ENCOUNTER — PATIENT MESSAGE (OUTPATIENT)
Dept: INTERNAL MEDICINE | Facility: CLINIC | Age: 54
End: 2019-09-07

## 2019-10-17 DIAGNOSIS — R03.0 ELEVATED BLOOD-PRESSURE READING WITHOUT DIAGNOSIS OF HYPERTENSION: ICD-10-CM

## 2019-10-17 RX ORDER — AMLODIPINE BESYLATE 5 MG/1
TABLET ORAL
Qty: 30 TABLET | Refills: 11 | Status: SHIPPED | OUTPATIENT
Start: 2019-10-17 | End: 2021-04-19 | Stop reason: SDUPTHER

## 2019-10-31 ENCOUNTER — PATIENT MESSAGE (OUTPATIENT)
Dept: INTERNAL MEDICINE | Facility: CLINIC | Age: 54
End: 2019-10-31

## 2019-11-04 ENCOUNTER — OFFICE VISIT (OUTPATIENT)
Dept: INTERNAL MEDICINE | Facility: CLINIC | Age: 54
End: 2019-11-04
Payer: COMMERCIAL

## 2019-11-04 VITALS
BODY MASS INDEX: 36.65 KG/M2 | SYSTOLIC BLOOD PRESSURE: 128 MMHG | DIASTOLIC BLOOD PRESSURE: 84 MMHG | HEIGHT: 65 IN | WEIGHT: 220 LBS

## 2019-11-04 DIAGNOSIS — I10 ESSENTIAL HYPERTENSION: ICD-10-CM

## 2019-11-04 DIAGNOSIS — Z78.0 MENOPAUSE: ICD-10-CM

## 2019-11-04 DIAGNOSIS — F41.9 ANXIETY: Primary | ICD-10-CM

## 2019-11-04 PROCEDURE — 99999 PR PBB SHADOW E&M-EST. PATIENT-LVL III: CPT | Mod: PBBFAC,,, | Performed by: INTERNAL MEDICINE

## 2019-11-04 PROCEDURE — 3008F BODY MASS INDEX DOCD: CPT | Mod: CPTII,S$GLB,, | Performed by: INTERNAL MEDICINE

## 2019-11-04 PROCEDURE — 99999 PR PBB SHADOW E&M-EST. PATIENT-LVL III: ICD-10-PCS | Mod: PBBFAC,,, | Performed by: INTERNAL MEDICINE

## 2019-11-04 PROCEDURE — 99214 PR OFFICE/OUTPT VISIT, EST, LEVL IV, 30-39 MIN: ICD-10-PCS | Mod: S$GLB,,, | Performed by: INTERNAL MEDICINE

## 2019-11-04 PROCEDURE — 99214 OFFICE O/P EST MOD 30 MIN: CPT | Mod: S$GLB,,, | Performed by: INTERNAL MEDICINE

## 2019-11-04 PROCEDURE — 3008F PR BODY MASS INDEX (BMI) DOCUMENTED: ICD-10-PCS | Mod: CPTII,S$GLB,, | Performed by: INTERNAL MEDICINE

## 2019-11-04 RX ORDER — ESCITALOPRAM OXALATE 10 MG/1
10 TABLET ORAL DAILY
Qty: 30 TABLET | Refills: 5 | Status: SHIPPED | OUTPATIENT
Start: 2019-11-04 | End: 2019-11-14

## 2019-11-04 NOTE — PROGRESS NOTES
"Subjective:       Patient ID: Carol Collins is a 54 y.o. female.    Chief Complaint: Hypertension (reports of BP being elevated as of lately, running in the 160s systolic, 80s diastolic. normally runs at about 130-140s systolic. patient experiences unusual sx (when BP is high), sx like fullness in chest and unable to function in the workplace. denies SOB or chest pain.  ) and Anxiety (possibly situational? patient says she has a lot going on with work and gets overwhelming at times. feels anxious.)    HPI   55 yo F here for evaluation of anxiety,elevated BP.  Amlodipine 5mg - BP has been aroudn 120s-130s at home.   Hydroxyzine 25mg previously - not taking.    She went in to ED on 7/19 for headache.     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     Review of Systems   Constitutional: Negative for fever.   Respiratory: Negative for shortness of breath.    Cardiovascular: Negative for chest pain.   Musculoskeletal: Negative.    Skin: Negative.        Objective:   /84 (BP Location: Right arm, Patient Position: Sitting, BP Method: Large (Manual))   Ht 5' 5" (1.651 m)   Wt 99.8 kg (220 lb)   BMI 36.61 kg/m²      Physical Exam   Constitutional: She is oriented to person, place, and time. She appears well-developed and well-nourished. No distress.   HENT:   Head: Normocephalic and atraumatic.   Cardiovascular: Normal rate and regular rhythm.   Pulmonary/Chest: Effort normal. No respiratory distress. She has no wheezes. She has no rales.   Neurological: She is alert and oriented to person, place, and time.   Skin: Skin is warm and dry. She is not diaphoretic.   Psychiatric: She has a normal mood and affect. Her behavior is normal.       Assessment:       1. Anxiety    2. Essential hypertension    3. Menopause        Plan:       Carol was seen today for hypertension and anxiety.    Diagnoses and all orders for this visit:    Anxiety  -     Start escitalopram oxalate (LEXAPRO) " 10 MG tablet; Take 1 tablet (10 mg total) by mouth once daily.   Discussed medication method of action and potential side effects at length   Follow up 2 mo     Essential hypertension   At goal, continue current meds    Menopause  -     Ambulatory referral to Obstetrics / Gynecology          Would approve intermittent FMLA for flares of anxiety and appointments. Perhaps 4 days/month max.

## 2019-11-06 ENCOUNTER — TELEPHONE (OUTPATIENT)
Dept: INTERNAL MEDICINE | Facility: CLINIC | Age: 54
End: 2019-11-06

## 2019-11-06 NOTE — TELEPHONE ENCOUNTER
----- Message from Roberta Andujar sent at 11/6/2019  8:47 AM CST -----  Contact: Pino [facility] [phone] ext [phone]  Pino called to inform doctor pt has been discharged from case management.

## 2019-11-06 NOTE — TELEPHONE ENCOUNTER
Flaquita from [facility] called to inform you that patient was discharged from case [facility].

## 2019-11-14 ENCOUNTER — PATIENT MESSAGE (OUTPATIENT)
Dept: INTERNAL MEDICINE | Facility: CLINIC | Age: 54
End: 2019-11-14

## 2019-11-14 DIAGNOSIS — F41.9 ANXIETY: ICD-10-CM

## 2019-11-14 RX ORDER — ESCITALOPRAM OXALATE 10 MG/1
5 TABLET ORAL DAILY
Qty: 15 TABLET | Refills: 5 | COMMUNITY
Start: 2019-11-14 | End: 2021-04-19

## 2019-11-20 ENCOUNTER — PATIENT MESSAGE (OUTPATIENT)
Dept: INTERNAL MEDICINE | Facility: CLINIC | Age: 54
End: 2019-11-20

## 2019-11-20 DIAGNOSIS — R00.2 PALPITATIONS: Primary | ICD-10-CM

## 2019-11-21 ENCOUNTER — PATIENT MESSAGE (OUTPATIENT)
Dept: INTERNAL MEDICINE | Facility: CLINIC | Age: 54
End: 2019-11-21

## 2020-02-03 ENCOUNTER — PATIENT MESSAGE (OUTPATIENT)
Dept: INTERNAL MEDICINE | Facility: CLINIC | Age: 55
End: 2020-02-03

## 2020-02-06 ENCOUNTER — OFFICE VISIT (OUTPATIENT)
Dept: INTERNAL MEDICINE | Facility: CLINIC | Age: 55
End: 2020-02-06
Payer: COMMERCIAL

## 2020-02-06 VITALS
SYSTOLIC BLOOD PRESSURE: 129 MMHG | HEIGHT: 65 IN | DIASTOLIC BLOOD PRESSURE: 59 MMHG | BODY MASS INDEX: 37.9 KG/M2 | WEIGHT: 227.5 LBS | HEART RATE: 72 BPM | OXYGEN SATURATION: 100 %

## 2020-02-06 DIAGNOSIS — M62.830 LUMBAR PARASPINAL MUSCLE SPASM: Primary | ICD-10-CM

## 2020-02-06 PROCEDURE — 99999 PR PBB SHADOW E&M-EST. PATIENT-LVL IV: ICD-10-PCS | Mod: PBBFAC,,, | Performed by: INTERNAL MEDICINE

## 2020-02-06 PROCEDURE — 99213 PR OFFICE/OUTPT VISIT, EST, LEVL III, 20-29 MIN: ICD-10-PCS | Mod: S$GLB,,, | Performed by: INTERNAL MEDICINE

## 2020-02-06 PROCEDURE — 99999 PR PBB SHADOW E&M-EST. PATIENT-LVL IV: CPT | Mod: PBBFAC,,, | Performed by: INTERNAL MEDICINE

## 2020-02-06 PROCEDURE — 3008F BODY MASS INDEX DOCD: CPT | Mod: CPTII,S$GLB,, | Performed by: INTERNAL MEDICINE

## 2020-02-06 PROCEDURE — 3074F PR MOST RECENT SYSTOLIC BLOOD PRESSURE < 130 MM HG: ICD-10-PCS | Mod: CPTII,S$GLB,, | Performed by: INTERNAL MEDICINE

## 2020-02-06 PROCEDURE — 3078F PR MOST RECENT DIASTOLIC BLOOD PRESSURE < 80 MM HG: ICD-10-PCS | Mod: CPTII,S$GLB,, | Performed by: INTERNAL MEDICINE

## 2020-02-06 PROCEDURE — 3008F PR BODY MASS INDEX (BMI) DOCUMENTED: ICD-10-PCS | Mod: CPTII,S$GLB,, | Performed by: INTERNAL MEDICINE

## 2020-02-06 PROCEDURE — 3078F DIAST BP <80 MM HG: CPT | Mod: CPTII,S$GLB,, | Performed by: INTERNAL MEDICINE

## 2020-02-06 PROCEDURE — 99213 OFFICE O/P EST LOW 20 MIN: CPT | Mod: S$GLB,,, | Performed by: INTERNAL MEDICINE

## 2020-02-06 PROCEDURE — 3074F SYST BP LT 130 MM HG: CPT | Mod: CPTII,S$GLB,, | Performed by: INTERNAL MEDICINE

## 2020-02-06 RX ORDER — TIZANIDINE 2 MG/1
4 TABLET ORAL EVERY 8 HOURS PRN
Qty: 30 TABLET | Refills: 1 | Status: SHIPPED | OUTPATIENT
Start: 2020-02-06 | End: 2020-02-16

## 2020-02-06 NOTE — PROGRESS NOTES
Subjective:       Patient ID: Carol Collins is a 54 y.o. female.    Chief Complaint: Back Pain (lower )    54 year old lady complains of right lateral low back pain since lifting a flat of water 3-4 days ago.  No leg pain.  Slightly better with advil but this doesn't last very long    Review of Systems   Constitutional: Negative for activity change, chills, fatigue and fever.   HENT: Negative for congestion, ear pain, nosebleeds, postnasal drip, sinus pressure and sore throat.    Eyes: Negative.  Negative for visual disturbance.   Respiratory: Negative for cough, chest tightness, shortness of breath and wheezing.    Cardiovascular: Negative for chest pain.   Gastrointestinal: Negative for abdominal pain, diarrhea, nausea and vomiting.   Genitourinary: Negative for difficulty urinating, dysuria, frequency and urgency.   Musculoskeletal: Negative for arthralgias and neck stiffness.   Skin: Negative for rash.   Neurological: Negative for dizziness, weakness and headaches.   Psychiatric/Behavioral: Negative for sleep disturbance. The patient is not nervous/anxious.        Objective:      Physical Exam   Musculoskeletal:        Back:        Assessment:       1. Lumbar paraspinal muscle spasm        Plan:   Carol was seen today for back pain.    Diagnoses and all orders for this visit:    Lumbar paraspinal muscle spasm  -     Ambulatory referral/consult to Physical Therapy; Future    Other orders  -     tiZANidine (ZANAFLEX) 2 MG tablet; Take 2 tablets (4 mg total) by mouth every 8 (eight) hours as needed (muscle spasm).

## 2020-02-17 ENCOUNTER — PATIENT MESSAGE (OUTPATIENT)
Dept: INTERNAL MEDICINE | Facility: CLINIC | Age: 55
End: 2020-02-17

## 2020-02-19 DIAGNOSIS — M62.830 LUMBAR PARASPINAL MUSCLE SPASM: Primary | ICD-10-CM

## 2020-05-15 NOTE — PROGRESS NOTES
"Individual Psychotherapy (PhD/LCSW)    8/8/2018    Site:  [facility]         Therapeutic Intervention: Met with patient.  Outpatient - Insight oriented psychotherapy 45 min - CPT code 87247    Chief complaint/reason for encounter: anxiety     Interval history and content of current session: Patient presents for the first time since her initial evaluation in June 2018.  She reports she is doing "much better" today, than at last session.  She elaborates there have been some resolutions regarding occupational stressors - one employee has been terminated and other employee with issues has been more attentive.  Patient acknowledges long history worrying - "I worry about everything."  She describes fearing any changes.  Discussed viewing change as positive.  Educated patient about cognitive behavioral therapy and its benefits.  Encouraged patient to begin keeping thought log.  Provided handout about challenging irrational thoughts.  Patient mentions she is eligible for full halfway in 2 years.  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         Treatment plan:  · Target symptoms: anxiety   · Why chosen therapy is appropriate versus another modality: relevant to diagnosis  · Outcome monitoring methods: self-report, observation  · Therapeutic intervention type: insight oriented psychotherapy    Risk parameters:  Patient reports no suicidal ideation  Patient reports no homicidal ideation  Patient reports no self-injurious behavior  Patient reports no violent behavior    Verbal deficits: None    Patient's response to intervention:  The patient's response to intervention is accepting.    Progress toward goals and other mental status changes:  The patient's progress toward goals is fair .    Diagnosis:     ICD-10-CM ICD-9-CM   1. Anxiety F41.9 300.00       Plan:  individual psychotherapy    Return to clinic: as scheduled    Length of Service (minutes): 45    " 4

## 2020-06-22 ENCOUNTER — PATIENT MESSAGE (OUTPATIENT)
Dept: INTERNAL MEDICINE | Facility: CLINIC | Age: 55
End: 2020-06-22

## 2020-07-31 ENCOUNTER — HOSPITAL ENCOUNTER (OUTPATIENT)
Dept: RADIOLOGY | Facility: HOSPITAL | Age: 55
Discharge: HOME | End: 2020-07-31
Attending: OBSTETRICS & GYNECOLOGY
Payer: COMMERCIAL

## 2020-07-31 DIAGNOSIS — Z12.31 VISIT FOR SCREENING MAMMOGRAM: ICD-10-CM

## 2020-07-31 PROCEDURE — 77067 MAMMO DIGITAL SCREENING BILAT WITH TOMOSYNTHESIS_CAD: ICD-10-PCS | Mod: 26,,, | Performed by: RADIOLOGY

## 2020-07-31 PROCEDURE — 77063 MAMMO DIGITAL SCREENING BILAT WITH TOMOSYNTHESIS_CAD: ICD-10-PCS | Mod: 26,,, | Performed by: RADIOLOGY

## 2020-07-31 PROCEDURE — 77067 SCR MAMMO BI INCL CAD: CPT | Mod: TC

## 2020-07-31 PROCEDURE — 77063 BREAST TOMOSYNTHESIS BI: CPT | Mod: 26,,, | Performed by: RADIOLOGY

## 2020-07-31 PROCEDURE — 77067 SCR MAMMO BI INCL CAD: CPT | Mod: 26,,, | Performed by: RADIOLOGY

## 2020-09-25 ENCOUNTER — PATIENT MESSAGE (OUTPATIENT)
Dept: INTERNAL MEDICINE | Facility: CLINIC | Age: 55
End: 2020-09-25

## 2020-10-14 ENCOUNTER — PATIENT MESSAGE (OUTPATIENT)
Dept: INTERNAL MEDICINE | Facility: CLINIC | Age: 55
End: 2020-10-14

## 2020-10-15 NOTE — TELEPHONE ENCOUNTER
Communicating to patient through via the portal.   Patient stated that she would like to have lipid test done for cholesterol along with diabetes screenings as well.  Per her chart, last labs were 7/19 and labs ordered by PCP was drawn back on 05/16. Is she due again to have labs done?   Last lipid: 210  Last hemoglobin A1C: 5.7    Please advise, if possible.

## 2020-12-07 ENCOUNTER — PATIENT MESSAGE (OUTPATIENT)
Dept: INTERNAL MEDICINE | Facility: CLINIC | Age: 55
End: 2020-12-07

## 2020-12-07 ENCOUNTER — TELEPHONE (OUTPATIENT)
Dept: INTERNAL MEDICINE | Facility: CLINIC | Age: 55
End: 2020-12-07

## 2021-02-22 ENCOUNTER — OFFICE VISIT (OUTPATIENT)
Dept: OTOLARYNGOLOGY | Facility: CLINIC | Age: 56
End: 2021-02-22
Payer: COMMERCIAL

## 2021-02-22 DIAGNOSIS — H91.92 DEAFNESS, LEFT: Primary | ICD-10-CM

## 2021-02-22 DIAGNOSIS — J34.89 SORE NOSE: ICD-10-CM

## 2021-02-22 DIAGNOSIS — J39.2 THROAT IRRITATION: ICD-10-CM

## 2021-02-22 PROCEDURE — 99203 OFFICE O/P NEW LOW 30 MIN: CPT | Mod: 95,,, | Performed by: OTOLARYNGOLOGY

## 2021-02-22 PROCEDURE — 99203 PR OFFICE/OUTPT VISIT, NEW, LEVL III, 30-44 MIN: ICD-10-PCS | Mod: 95,,, | Performed by: OTOLARYNGOLOGY

## 2021-04-13 ENCOUNTER — PATIENT MESSAGE (OUTPATIENT)
Dept: ADMINISTRATIVE | Facility: OTHER | Age: 56
End: 2021-04-13

## 2021-04-13 ENCOUNTER — NURSE TRIAGE (OUTPATIENT)
Dept: ADMINISTRATIVE | Facility: CLINIC | Age: 56
End: 2021-04-13

## 2021-04-13 ENCOUNTER — PATIENT MESSAGE (OUTPATIENT)
Dept: INTERNAL MEDICINE | Facility: CLINIC | Age: 56
End: 2021-04-13

## 2021-04-16 ENCOUNTER — PATIENT MESSAGE (OUTPATIENT)
Dept: INTERNAL MEDICINE | Facility: CLINIC | Age: 56
End: 2021-04-16

## 2021-04-19 ENCOUNTER — OFFICE VISIT (OUTPATIENT)
Dept: INTERNAL MEDICINE | Facility: CLINIC | Age: 56
End: 2021-04-19
Payer: COMMERCIAL

## 2021-04-19 VITALS
HEIGHT: 65 IN | WEIGHT: 231.06 LBS | BODY MASS INDEX: 38.5 KG/M2 | SYSTOLIC BLOOD PRESSURE: 139 MMHG | DIASTOLIC BLOOD PRESSURE: 85 MMHG

## 2021-04-19 DIAGNOSIS — R51.9 PERSISTENT HEADACHES: Primary | ICD-10-CM

## 2021-04-19 DIAGNOSIS — R03.0 ELEVATED BLOOD-PRESSURE READING WITHOUT DIAGNOSIS OF HYPERTENSION: ICD-10-CM

## 2021-04-19 DIAGNOSIS — I10 ESSENTIAL HYPERTENSION: ICD-10-CM

## 2021-04-19 PROCEDURE — 99999 PR PBB SHADOW E&M-EST. PATIENT-LVL III: ICD-10-PCS | Mod: PBBFAC,,, | Performed by: STUDENT IN AN ORGANIZED HEALTH CARE EDUCATION/TRAINING PROGRAM

## 2021-04-19 PROCEDURE — 99999 PR PBB SHADOW E&M-EST. PATIENT-LVL III: CPT | Mod: PBBFAC,,, | Performed by: STUDENT IN AN ORGANIZED HEALTH CARE EDUCATION/TRAINING PROGRAM

## 2021-04-19 PROCEDURE — 99213 OFFICE O/P EST LOW 20 MIN: CPT | Mod: S$GLB,,, | Performed by: STUDENT IN AN ORGANIZED HEALTH CARE EDUCATION/TRAINING PROGRAM

## 2021-04-19 PROCEDURE — 99213 PR OFFICE/OUTPT VISIT, EST, LEVL III, 20-29 MIN: ICD-10-PCS | Mod: S$GLB,,, | Performed by: STUDENT IN AN ORGANIZED HEALTH CARE EDUCATION/TRAINING PROGRAM

## 2021-04-19 RX ORDER — AMLODIPINE BESYLATE 5 MG/1
5 TABLET ORAL DAILY
Qty: 30 TABLET | Refills: 5 | Status: SHIPPED | OUTPATIENT
Start: 2021-04-19 | End: 2021-10-11

## 2021-04-28 ENCOUNTER — PATIENT MESSAGE (OUTPATIENT)
Dept: RESEARCH | Facility: HOSPITAL | Age: 56
End: 2021-04-28

## 2021-06-10 ENCOUNTER — PATIENT MESSAGE (OUTPATIENT)
Dept: INTERNAL MEDICINE | Facility: CLINIC | Age: 56
End: 2021-06-10

## 2021-06-10 ENCOUNTER — OFFICE VISIT (OUTPATIENT)
Dept: URGENT CARE | Facility: CLINIC | Age: 56
End: 2021-06-10
Payer: COMMERCIAL

## 2021-06-10 VITALS
TEMPERATURE: 98 F | HEART RATE: 84 BPM | WEIGHT: 230 LBS | BODY MASS INDEX: 38.32 KG/M2 | RESPIRATION RATE: 20 BRPM | DIASTOLIC BLOOD PRESSURE: 77 MMHG | OXYGEN SATURATION: 96 % | HEIGHT: 65 IN | SYSTOLIC BLOOD PRESSURE: 122 MMHG

## 2021-06-10 DIAGNOSIS — R10.9 LEFT FLANK PAIN: Primary | ICD-10-CM

## 2021-06-10 LAB
BILIRUB UR QL STRIP: NEGATIVE
GLUCOSE UR QL STRIP: NEGATIVE
KETONES UR QL STRIP: NEGATIVE
LEUKOCYTE ESTERASE UR QL STRIP: NEGATIVE
PH, POC UA: 5 (ref 5–8)
POC BLOOD, URINE: POSITIVE
POC NITRATES, URINE: NEGATIVE
PROT UR QL STRIP: NEGATIVE
SP GR UR STRIP: 1.01 (ref 1–1.03)
UROBILINOGEN UR STRIP-ACNC: NORMAL (ref 0.1–1.1)

## 2021-06-10 PROCEDURE — 99214 PR OFFICE/OUTPT VISIT, EST, LEVL IV, 30-39 MIN: ICD-10-PCS | Mod: 25,S$GLB,, | Performed by: EMERGENCY MEDICINE

## 2021-06-10 PROCEDURE — 81003 POCT URINALYSIS, DIPSTICK, AUTOMATED, W/O SCOPE: ICD-10-PCS | Mod: QW,S$GLB,, | Performed by: EMERGENCY MEDICINE

## 2021-06-10 PROCEDURE — 3008F BODY MASS INDEX DOCD: CPT | Mod: CPTII,S$GLB,, | Performed by: EMERGENCY MEDICINE

## 2021-06-10 PROCEDURE — 3008F PR BODY MASS INDEX (BMI) DOCUMENTED: ICD-10-PCS | Mod: CPTII,S$GLB,, | Performed by: EMERGENCY MEDICINE

## 2021-06-10 PROCEDURE — 99214 OFFICE O/P EST MOD 30 MIN: CPT | Mod: 25,S$GLB,, | Performed by: EMERGENCY MEDICINE

## 2021-06-10 PROCEDURE — 81003 URINALYSIS AUTO W/O SCOPE: CPT | Mod: QW,S$GLB,, | Performed by: EMERGENCY MEDICINE

## 2021-06-10 RX ORDER — MELOXICAM 15 MG/1
15 TABLET ORAL DAILY
Qty: 7 TABLET | Refills: 0 | Status: SHIPPED | OUTPATIENT
Start: 2021-06-10 | End: 2021-06-17

## 2021-06-10 RX ORDER — METHOCARBAMOL 500 MG/1
1000 TABLET, FILM COATED ORAL 4 TIMES DAILY
Qty: 56 TABLET | Refills: 0 | Status: SHIPPED | OUTPATIENT
Start: 2021-06-10 | End: 2021-06-17

## 2021-07-09 ENCOUNTER — PATIENT MESSAGE (OUTPATIENT)
Dept: INTERNAL MEDICINE | Facility: CLINIC | Age: 56
End: 2021-07-09

## 2021-07-09 DIAGNOSIS — Z12.31 ENCOUNTER FOR SCREENING MAMMOGRAM FOR MALIGNANT NEOPLASM OF BREAST: Primary | ICD-10-CM

## 2021-07-22 ENCOUNTER — OFFICE VISIT (OUTPATIENT)
Dept: INTERNAL MEDICINE | Facility: CLINIC | Age: 56
End: 2021-07-22
Payer: COMMERCIAL

## 2021-07-22 VITALS
HEART RATE: 69 BPM | BODY MASS INDEX: 38.57 KG/M2 | DIASTOLIC BLOOD PRESSURE: 82 MMHG | OXYGEN SATURATION: 99 % | WEIGHT: 231.5 LBS | SYSTOLIC BLOOD PRESSURE: 122 MMHG | HEIGHT: 65 IN

## 2021-07-22 DIAGNOSIS — I10 ESSENTIAL HYPERTENSION: ICD-10-CM

## 2021-07-22 DIAGNOSIS — Z80.3 FAMILY HISTORY OF BREAST CANCER: ICD-10-CM

## 2021-07-22 DIAGNOSIS — Z00.00 HEALTH CARE MAINTENANCE: Primary | ICD-10-CM

## 2021-07-22 PROCEDURE — 99999 PR PBB SHADOW E&M-EST. PATIENT-LVL IV: ICD-10-PCS | Mod: PBBFAC,,, | Performed by: INTERNAL MEDICINE

## 2021-07-22 PROCEDURE — 1126F PR PAIN SEVERITY QUANTIFIED, NO PAIN PRESENT: ICD-10-PCS | Mod: CPTII,S$GLB,, | Performed by: INTERNAL MEDICINE

## 2021-07-22 PROCEDURE — 3079F DIAST BP 80-89 MM HG: CPT | Mod: CPTII,S$GLB,, | Performed by: INTERNAL MEDICINE

## 2021-07-22 PROCEDURE — 1126F AMNT PAIN NOTED NONE PRSNT: CPT | Mod: CPTII,S$GLB,, | Performed by: INTERNAL MEDICINE

## 2021-07-22 PROCEDURE — 99999 PR PBB SHADOW E&M-EST. PATIENT-LVL IV: CPT | Mod: PBBFAC,,, | Performed by: INTERNAL MEDICINE

## 2021-07-22 PROCEDURE — 3008F BODY MASS INDEX DOCD: CPT | Mod: CPTII,S$GLB,, | Performed by: INTERNAL MEDICINE

## 2021-07-22 PROCEDURE — 3074F PR MOST RECENT SYSTOLIC BLOOD PRESSURE < 130 MM HG: ICD-10-PCS | Mod: CPTII,S$GLB,, | Performed by: INTERNAL MEDICINE

## 2021-07-22 PROCEDURE — 3008F PR BODY MASS INDEX (BMI) DOCUMENTED: ICD-10-PCS | Mod: CPTII,S$GLB,, | Performed by: INTERNAL MEDICINE

## 2021-07-22 PROCEDURE — 99396 PREV VISIT EST AGE 40-64: CPT | Mod: S$GLB,,, | Performed by: INTERNAL MEDICINE

## 2021-07-22 PROCEDURE — 3079F PR MOST RECENT DIASTOLIC BLOOD PRESSURE 80-89 MM HG: ICD-10-PCS | Mod: CPTII,S$GLB,, | Performed by: INTERNAL MEDICINE

## 2021-07-22 PROCEDURE — 99396 PR PREVENTIVE VISIT,EST,40-64: ICD-10-PCS | Mod: S$GLB,,, | Performed by: INTERNAL MEDICINE

## 2021-07-22 PROCEDURE — 3074F SYST BP LT 130 MM HG: CPT | Mod: CPTII,S$GLB,, | Performed by: INTERNAL MEDICINE

## 2021-08-06 ENCOUNTER — PATIENT MESSAGE (OUTPATIENT)
Dept: PHARMACY | Facility: CLINIC | Age: 56
End: 2021-08-06

## 2021-08-06 ENCOUNTER — HOSPITAL ENCOUNTER (OUTPATIENT)
Dept: RADIOLOGY | Facility: HOSPITAL | Age: 56
Discharge: HOME | End: 2021-08-06
Attending: INTERNAL MEDICINE
Payer: COMMERCIAL

## 2021-08-06 VITALS — HEIGHT: 65 IN | WEIGHT: 230 LBS | BODY MASS INDEX: 38.32 KG/M2

## 2021-08-06 DIAGNOSIS — Z12.31 ENCOUNTER FOR SCREENING MAMMOGRAM FOR MALIGNANT NEOPLASM OF BREAST: ICD-10-CM

## 2021-08-06 PROCEDURE — 77063 MAMMO DIGITAL SCREENING BILAT WITH TOMO: ICD-10-PCS | Mod: 26,,, | Performed by: RADIOLOGY

## 2021-08-06 PROCEDURE — 77067 MAMMO DIGITAL SCREENING BILAT WITH TOMO: ICD-10-PCS | Mod: 26,,, | Performed by: RADIOLOGY

## 2021-08-06 PROCEDURE — 77063 BREAST TOMOSYNTHESIS BI: CPT | Mod: 26,,, | Performed by: RADIOLOGY

## 2021-08-06 PROCEDURE — 77067 SCR MAMMO BI INCL CAD: CPT | Mod: 26,,, | Performed by: RADIOLOGY

## 2021-08-06 PROCEDURE — 77067 SCR MAMMO BI INCL CAD: CPT | Mod: TC

## 2021-08-27 ENCOUNTER — TELEPHONE (OUTPATIENT)
Dept: HEMATOLOGY/ONCOLOGY | Facility: CLINIC | Age: 56
End: 2021-08-27

## 2021-09-20 ENCOUNTER — OFFICE VISIT (OUTPATIENT)
Dept: HEMATOLOGY/ONCOLOGY | Facility: CLINIC | Age: 56
End: 2021-09-20
Payer: COMMERCIAL

## 2021-09-20 VITALS
BODY MASS INDEX: 38.81 KG/M2 | WEIGHT: 232.94 LBS | HEART RATE: 81 BPM | RESPIRATION RATE: 16 BRPM | OXYGEN SATURATION: 98 % | HEIGHT: 65 IN | DIASTOLIC BLOOD PRESSURE: 77 MMHG | TEMPERATURE: 98 F | SYSTOLIC BLOOD PRESSURE: 130 MMHG

## 2021-09-20 DIAGNOSIS — Z80.3 FAMILY HISTORY OF BREAST CANCER: ICD-10-CM

## 2021-09-20 DIAGNOSIS — D22.9 ATYPICAL MOLE: ICD-10-CM

## 2021-09-20 DIAGNOSIS — Z91.89 AT HIGH RISK FOR BREAST CANCER: Primary | ICD-10-CM

## 2021-09-20 DIAGNOSIS — Z79.890 HORMONE REPLACEMENT THERAPY (HRT): ICD-10-CM

## 2021-09-20 PROCEDURE — 3075F SYST BP GE 130 - 139MM HG: CPT | Mod: CPTII,S$GLB,, | Performed by: NURSE PRACTITIONER

## 2021-09-20 PROCEDURE — 3075F PR MOST RECENT SYSTOLIC BLOOD PRESS GE 130-139MM HG: ICD-10-PCS | Mod: CPTII,S$GLB,, | Performed by: NURSE PRACTITIONER

## 2021-09-20 PROCEDURE — 3078F PR MOST RECENT DIASTOLIC BLOOD PRESSURE < 80 MM HG: ICD-10-PCS | Mod: CPTII,S$GLB,, | Performed by: NURSE PRACTITIONER

## 2021-09-20 PROCEDURE — 3008F PR BODY MASS INDEX (BMI) DOCUMENTED: ICD-10-PCS | Mod: CPTII,S$GLB,, | Performed by: NURSE PRACTITIONER

## 2021-09-20 PROCEDURE — 1159F PR MEDICATION LIST DOCUMENTED IN MEDICAL RECORD: ICD-10-PCS | Mod: CPTII,S$GLB,, | Performed by: NURSE PRACTITIONER

## 2021-09-20 PROCEDURE — 99205 OFFICE O/P NEW HI 60 MIN: CPT | Mod: S$GLB,,, | Performed by: NURSE PRACTITIONER

## 2021-09-20 PROCEDURE — 99999 PR PBB SHADOW E&M-EST. PATIENT-LVL V: ICD-10-PCS | Mod: PBBFAC,,, | Performed by: NURSE PRACTITIONER

## 2021-09-20 PROCEDURE — 1159F MED LIST DOCD IN RCRD: CPT | Mod: CPTII,S$GLB,, | Performed by: NURSE PRACTITIONER

## 2021-09-20 PROCEDURE — 99999 PR PBB SHADOW E&M-EST. PATIENT-LVL V: CPT | Mod: PBBFAC,,, | Performed by: NURSE PRACTITIONER

## 2021-09-20 PROCEDURE — 3078F DIAST BP <80 MM HG: CPT | Mod: CPTII,S$GLB,, | Performed by: NURSE PRACTITIONER

## 2021-09-20 PROCEDURE — 99205 PR OFFICE/OUTPT VISIT, NEW, LEVL V, 60-74 MIN: ICD-10-PCS | Mod: S$GLB,,, | Performed by: NURSE PRACTITIONER

## 2021-09-20 PROCEDURE — 3008F BODY MASS INDEX DOCD: CPT | Mod: CPTII,S$GLB,, | Performed by: NURSE PRACTITIONER

## 2021-09-21 ENCOUNTER — PATIENT MESSAGE (OUTPATIENT)
Dept: HEMATOLOGY/ONCOLOGY | Facility: CLINIC | Age: 56
End: 2021-09-21

## 2021-09-21 ENCOUNTER — TELEPHONE (OUTPATIENT)
Dept: OBSTETRICS AND GYNECOLOGY | Facility: CLINIC | Age: 56
End: 2021-09-21

## 2021-10-10 DIAGNOSIS — I10 ESSENTIAL HYPERTENSION: ICD-10-CM

## 2021-10-11 RX ORDER — AMLODIPINE BESYLATE 5 MG/1
TABLET ORAL
Qty: 90 TABLET | Refills: 3 | Status: SHIPPED | OUTPATIENT
Start: 2021-10-11 | End: 2022-10-09

## 2021-10-20 ENCOUNTER — TELEPHONE (OUTPATIENT)
Dept: HEMATOLOGY/ONCOLOGY | Facility: CLINIC | Age: 56
End: 2021-10-20

## 2021-10-25 ENCOUNTER — OFFICE VISIT (OUTPATIENT)
Dept: HEMATOLOGY/ONCOLOGY | Facility: CLINIC | Age: 56
End: 2021-10-25
Payer: COMMERCIAL

## 2021-10-25 ENCOUNTER — LAB VISIT (OUTPATIENT)
Dept: LAB | Facility: HOSPITAL | Age: 56
End: 2021-10-25
Payer: COMMERCIAL

## 2021-10-25 VITALS
BODY MASS INDEX: 38.93 KG/M2 | SYSTOLIC BLOOD PRESSURE: 126 MMHG | HEART RATE: 72 BPM | HEIGHT: 65 IN | WEIGHT: 233.69 LBS | DIASTOLIC BLOOD PRESSURE: 58 MMHG

## 2021-10-25 DIAGNOSIS — N95.1 MENOPAUSAL SYMPTOMS: Primary | ICD-10-CM

## 2021-10-25 DIAGNOSIS — Z91.89 AT HIGH RISK FOR BREAST CANCER: ICD-10-CM

## 2021-10-25 DIAGNOSIS — N95.1 MENOPAUSAL SYMPTOMS: ICD-10-CM

## 2021-10-25 LAB — ESTRADIOL SERPL-MCNC: 64 PG/ML

## 2021-10-25 PROCEDURE — 82670 ASSAY OF TOTAL ESTRADIOL: CPT | Performed by: PHYSICIAN ASSISTANT

## 2021-10-25 PROCEDURE — 1159F MED LIST DOCD IN RCRD: CPT | Mod: CPTII,S$GLB,, | Performed by: PHYSICIAN ASSISTANT

## 2021-10-25 PROCEDURE — 3074F SYST BP LT 130 MM HG: CPT | Mod: CPTII,S$GLB,, | Performed by: PHYSICIAN ASSISTANT

## 2021-10-25 PROCEDURE — 3078F DIAST BP <80 MM HG: CPT | Mod: CPTII,S$GLB,, | Performed by: PHYSICIAN ASSISTANT

## 2021-10-25 PROCEDURE — 3008F BODY MASS INDEX DOCD: CPT | Mod: CPTII,S$GLB,, | Performed by: PHYSICIAN ASSISTANT

## 2021-10-25 PROCEDURE — 3078F PR MOST RECENT DIASTOLIC BLOOD PRESSURE < 80 MM HG: ICD-10-PCS | Mod: CPTII,S$GLB,, | Performed by: PHYSICIAN ASSISTANT

## 2021-10-25 PROCEDURE — 1159F PR MEDICATION LIST DOCUMENTED IN MEDICAL RECORD: ICD-10-PCS | Mod: CPTII,S$GLB,, | Performed by: PHYSICIAN ASSISTANT

## 2021-10-25 PROCEDURE — 99999 PR PBB SHADOW E&M-EST. PATIENT-LVL II: ICD-10-PCS | Mod: PBBFAC,,, | Performed by: PHYSICIAN ASSISTANT

## 2021-10-25 PROCEDURE — 99203 OFFICE O/P NEW LOW 30 MIN: CPT | Mod: S$GLB,,, | Performed by: PHYSICIAN ASSISTANT

## 2021-10-25 PROCEDURE — 3074F PR MOST RECENT SYSTOLIC BLOOD PRESSURE < 130 MM HG: ICD-10-PCS | Mod: CPTII,S$GLB,, | Performed by: PHYSICIAN ASSISTANT

## 2021-10-25 PROCEDURE — 3008F PR BODY MASS INDEX (BMI) DOCUMENTED: ICD-10-PCS | Mod: CPTII,S$GLB,, | Performed by: PHYSICIAN ASSISTANT

## 2021-10-25 PROCEDURE — 36415 COLL VENOUS BLD VENIPUNCTURE: CPT | Performed by: PHYSICIAN ASSISTANT

## 2021-10-25 PROCEDURE — 99999 PR PBB SHADOW E&M-EST. PATIENT-LVL II: CPT | Mod: PBBFAC,,, | Performed by: PHYSICIAN ASSISTANT

## 2021-10-25 PROCEDURE — 99203 PR OFFICE/OUTPT VISIT, NEW, LEVL III, 30-44 MIN: ICD-10-PCS | Mod: S$GLB,,, | Performed by: PHYSICIAN ASSISTANT

## 2021-11-06 ENCOUNTER — IMMUNIZATION (OUTPATIENT)
Dept: INTERNAL MEDICINE | Facility: CLINIC | Age: 56
End: 2021-11-06
Payer: COMMERCIAL

## 2021-11-06 PROCEDURE — 90471 IMMUNIZATION ADMIN: CPT | Mod: S$GLB,,, | Performed by: INTERNAL MEDICINE

## 2021-11-06 PROCEDURE — 90686 FLU VACCINE (QUAD) GREATER THAN OR EQUAL TO 3YO PRESERVATIVE FREE IM: ICD-10-PCS | Mod: S$GLB,,, | Performed by: INTERNAL MEDICINE

## 2021-11-06 PROCEDURE — 90686 IIV4 VACC NO PRSV 0.5 ML IM: CPT | Mod: S$GLB,,, | Performed by: INTERNAL MEDICINE

## 2021-11-06 PROCEDURE — 90471 FLU VACCINE (QUAD) GREATER THAN OR EQUAL TO 3YO PRESERVATIVE FREE IM: ICD-10-PCS | Mod: S$GLB,,, | Performed by: INTERNAL MEDICINE

## 2021-11-11 ENCOUNTER — OFFICE VISIT (OUTPATIENT)
Dept: DERMATOLOGY | Facility: CLINIC | Age: 56
End: 2021-11-11
Payer: COMMERCIAL

## 2021-11-11 DIAGNOSIS — R20.2 NOTALGIA PARESTHETICA: Primary | ICD-10-CM

## 2021-11-11 PROCEDURE — 99999 PR PBB SHADOW E&M-EST. PATIENT-LVL III: ICD-10-PCS | Mod: PBBFAC,,, | Performed by: PATHOLOGY

## 2021-11-11 PROCEDURE — 99203 PR OFFICE/OUTPT VISIT, NEW, LEVL III, 30-44 MIN: ICD-10-PCS | Mod: S$GLB,,, | Performed by: PATHOLOGY

## 2021-11-11 PROCEDURE — 99203 OFFICE O/P NEW LOW 30 MIN: CPT | Mod: S$GLB,,, | Performed by: PATHOLOGY

## 2021-11-11 PROCEDURE — 1159F MED LIST DOCD IN RCRD: CPT | Mod: CPTII,S$GLB,, | Performed by: PATHOLOGY

## 2021-11-11 PROCEDURE — 1159F PR MEDICATION LIST DOCUMENTED IN MEDICAL RECORD: ICD-10-PCS | Mod: CPTII,S$GLB,, | Performed by: PATHOLOGY

## 2021-11-11 PROCEDURE — 99999 PR PBB SHADOW E&M-EST. PATIENT-LVL III: CPT | Mod: PBBFAC,,, | Performed by: PATHOLOGY

## 2021-11-11 RX ORDER — TRIAMCINOLONE ACETONIDE 1 MG/G
CREAM TOPICAL
Qty: 60 G | Refills: 3 | Status: SHIPPED | OUTPATIENT
Start: 2021-11-11 | End: 2022-11-29

## 2021-11-24 ENCOUNTER — IMMUNIZATION (OUTPATIENT)
Dept: INTERNAL MEDICINE | Facility: CLINIC | Age: 56
End: 2021-11-24
Payer: COMMERCIAL

## 2021-11-24 DIAGNOSIS — Z23 NEED FOR VACCINATION: Primary | ICD-10-CM

## 2021-11-24 PROCEDURE — 0064A COVID-19, MRNA, LNP-S, PF, 100 MCG/0.25 ML DOSE VACCINE (MODERNA BOOSTER): CPT | Mod: CV19,PBBFAC | Performed by: INTERNAL MEDICINE

## 2021-11-25 ENCOUNTER — PATIENT MESSAGE (OUTPATIENT)
Dept: INTERNAL MEDICINE | Facility: CLINIC | Age: 56
End: 2021-11-25
Payer: COMMERCIAL

## 2021-12-16 ENCOUNTER — PATIENT MESSAGE (OUTPATIENT)
Dept: INTERNAL MEDICINE | Facility: CLINIC | Age: 56
End: 2021-12-16
Payer: COMMERCIAL

## 2021-12-21 ENCOUNTER — PATIENT MESSAGE (OUTPATIENT)
Dept: INTERNAL MEDICINE | Facility: CLINIC | Age: 56
End: 2021-12-21
Payer: COMMERCIAL

## 2021-12-21 ENCOUNTER — TELEPHONE (OUTPATIENT)
Dept: TRANSPLANT | Facility: CLINIC | Age: 56
End: 2021-12-21
Payer: COMMERCIAL

## 2021-12-23 ENCOUNTER — PATIENT MESSAGE (OUTPATIENT)
Dept: INTERNAL MEDICINE | Facility: CLINIC | Age: 56
End: 2021-12-23
Payer: COMMERCIAL

## 2022-01-05 ENCOUNTER — TELEPHONE (OUTPATIENT)
Dept: INTERNAL MEDICINE | Facility: CLINIC | Age: 57
End: 2022-01-05
Payer: COMMERCIAL

## 2022-01-05 NOTE — TELEPHONE ENCOUNTER
----- Message from Adelita Camarillo sent at 1/5/2022  1:23 PM CST -----  Contact: self [phone]  Type: Returning a call    Who left a message? Angela    When did the practice call? Yesterday    Comments: please call her back

## 2022-01-14 ENCOUNTER — PATIENT MESSAGE (OUTPATIENT)
Dept: HEMATOLOGY/ONCOLOGY | Facility: CLINIC | Age: 57
End: 2022-01-14
Payer: COMMERCIAL

## 2022-01-20 ENCOUNTER — PATIENT MESSAGE (OUTPATIENT)
Dept: HEMATOLOGY/ONCOLOGY | Facility: CLINIC | Age: 57
End: 2022-01-20

## 2022-01-20 ENCOUNTER — OFFICE VISIT (OUTPATIENT)
Dept: HEMATOLOGY/ONCOLOGY | Facility: CLINIC | Age: 57
End: 2022-01-20
Payer: COMMERCIAL

## 2022-01-20 DIAGNOSIS — Z80.3 FAMILY HISTORY OF BREAST CANCER: ICD-10-CM

## 2022-01-20 DIAGNOSIS — Z71.83 ENCOUNTER FOR NONPROCREATIVE GENETIC COUNSELING: Primary | ICD-10-CM

## 2022-01-20 PROCEDURE — 99215 PR OFFICE/OUTPT VISIT, EST, LEVL V, 40-54 MIN: ICD-10-PCS | Mod: 95,,, | Performed by: NURSE PRACTITIONER

## 2022-01-20 PROCEDURE — 99215 OFFICE O/P EST HI 40 MIN: CPT | Mod: 95,,, | Performed by: NURSE PRACTITIONER

## 2022-01-20 NOTE — PROGRESS NOTES
"Cancer Genetics  Hereditary/High Risk Clinic  Department of Hematology and Oncology  [facility]    Date of Service:  22  Provider:  Mendoza Cabrales DNP    Patient Information  Name: Carol Collins  : 1965  MRN: [id number]     Referring Provider    Grover Wilkinson NP  [address] [place] [address]    Televisit Information  The patient location is: [place], [place].  The chief complaint leading to consultation is: as below.  Visit type: audiovisual.  Face-to-face time with patient: approximately 53 minutes.  Approximately 55 minutes in total were spent on this encounter, which includes face-to-face time and non-face-to-face time preparing to see the patient (e.g., review of tests), obtaining and/or reviewing separately obtained history, documenting clinical information in the electronic or other health record, independently interpreting results (not separately reported) and communicating results to the patient/family/caregiver, or care coordination (not separately reported).  Each patient to whom he or she provides medical services by telemedicine is:  (1) informed of the relationship between the physician and patient and the respective role of any other health care provider with respect to management of the patient; and (2) notified that he or she may decline to receive medical services by telemedicine and may withdraw from such care at any time.  Notes:     SUBJECTIVE      Chief Complaint: Genetic Evaluation    History of Present Illness (HPI):  Carol Collnis ("Carol"), 57 y.o., assigned female sex at birth, is established with the [facility] of [facility] but new to me.  She was referred by her provider with the [facility] for genetic cancer risk assessment given her family history of breast cancer.    Focused Medical History    Germline cancer genetic testing:  No h/o any genetic testing   Cancer:     No   Colon " polyp:    No  o Most recent colonoscopy was at age 49 (i.e., about 8 years ago), which was normal, with 10-year repeat screening colonoscopy recommended at that time   Other benign tumor/mass:  Yes  o Right breast mass   Pancreatitis or pancreatic cyst:  No    Chemoprevention Use   Never used    Focused Surgical History   S/p hysterectomy; ovaries and tubes remain in situ     Ancestry   Ashkenazi Confucianism ancestry:    No    Focused Family History   Consanguinity in ancestors:    No   Germline cancer genetic testing    in blood relatives:    No    Family Cancer Pedigree       Unknown whether sister Elba's and/or sister Roxane's breast cancers were triple-negative.   No known history of cancer in maternal extended family or descendants of nieces/nephews other than noted.   No known family history of colon polyps other than noted.   Unaware of or limited knowledge of medical history of:  paternal extended family including paternal family.     Review of Systems   See HPI.       OBJECTIVE       Past Medical History:   Diagnosis Date    GERD (gastroesophageal reflux disease)     Heart palpitations 5/2/2018     Patient Active Problem List    Diagnosis Date Noted    Essential hypertension 08/09/2019    Hormone replacement therapy (HRT) 07/19/2019    Hiatal hernia 02/27/2019    Schatzki's ring 02/27/2019    Anxiety 06/17/2018    Heart palpitations 05/02/2018    Menopausal symptoms 01/09/2018    BMI 36.0-36.9,adult 01/09/2018    GERD (gastroesophageal reflux disease) 10/23/2017    Gastroesophageal reflux disease without esophagitis 10/02/2017      Physical Exam  Significantly limited secondary to the inherent nature of a virtual visit.  Very pleasant patient.  Constitutional       Appearance:  She appears to be in no distress.   Neurological     Mental Status:  She is alert and oriented.  Psychiatric        Mood and Affect:  She has a normal mood and affect.     Thought Content:  Thought content is  normal.         Speech:  Speech is normal.     Behavior:  Behavior is normal.     Judgment:  Judgment is normal.   Genetics-specific     It is my assessment that the patient is ready to proceed with cancer-genetic testing from a psychosocial perspective.    ASSESSMENT/PLAN      Causes of cancer    Germline cancer genetic testing is the testing of genes associated with cancer, known as cancer susceptibility genes.  A mutation in a cancer susceptibility gene adversely affects the gene's ability to prevent cancer; therefore, carriers of cancer susceptibility gene mutations may be at increased risk for certain cancers.    Only a small percentage of cancers are caused by a cancer susceptibility gene mutation, meaning the cancer is genetic/hereditary; rather, most cancers are sporadic.  Causes of sporadic cancers may include environmental risk factors, lifestyle risk factors, and non-modifiable risk factors.     Potential results of genetic testing, and their implications    Potential results of genetic testing include positive, negative, and variant of unknown significance (VUS).     A positive result indicates the presence of at least one clinically significant mutation, and the patient's associated cancer risks vary depending upon the cancer susceptibility gene(s) in which there is/are a mutation(s).  With a positive result, in some cases, depending upon the specific result and the patient's clinical history, modified risk management may be recommended, including measures for risk reduction and/or surveillance; however, modified management is not always an option.     A negative result indicates that no clinically significant mutations were identified in the gene(s) tested.     A VUS indicates that there is not presently enough data for the laboratory to make a determination as to whether the variant is clinically significant.  VUSs are not typically acted upon clinically.      Genetic mutation inheritance    If  Carol tests positive for a mutation, her first-degree relatives would each have a 50% chance of having the same mutation, and other, more distantly related blood-relatives would also be at risk of having the same mutation.       Genetic discrimination    The Genetic Information Nondiscrimination Act (ABY) is [place] federal legislation that provides some protections against use of an individual's genetic information by their health insurer and by their employer.  Title I of ABY prohibits most health insurers from utilizing an individual's genetic information to make decisions regarding insurance eligibility, coverage, underwriting, or premium charges.  Title II of ABY prohibits covered entities, which include employers, employment agencies, labor organizations, and joint labor-management training and apprenticeship programs, from requesting, requiring, or disclosing the genetic information of employees and applicants.  ABY also prohibits health insurers and employers from asking or mandating that an individual take a genetic test.  ABY does not protect individuals from genetic discrimination toward health insurance obtained through a job with the  or through the Federal Employees Health Benefits Plan; from genetic discrimination by employers with fewer than 15 employees; or from genetic discrimination by any other type of policies/entities, including but not limited to life insurance, disability insurance, long-term care insurance,  benefits, and  Health Services benefits.    1. Encounter for nonprocreative genetic counseling  - Genetic cancer risk assessment and pre-test cancer genetic counseling were conducted.    - Patient elects to defer genetic testing for hereditary cancer syndromes at this time.  Will plan for follow-up in 1 year.     2. Family history of breast cancer  - Ambulatory referral/consult to Genetics:  Completed  - Patient under care of [facility].    Questions were answered to the patient's satisfaction, and she verbalized understanding of information and agreement with the plan.       Follow-up:  Follow up in about 1 year (around 1/20/2023) for re-discussion regarding genetic testing, if the patient does not reach out sooner to test.    Mendoza Cabrales, DNP, APRN, FNP-BC, AOCNP  Nurse Practitioner, Hereditary/High Risk Clinic  Hematology/Oncology, [facility]

## 2022-07-21 ENCOUNTER — PATIENT MESSAGE (OUTPATIENT)
Dept: INTERNAL MEDICINE | Facility: CLINIC | Age: 57
End: 2022-07-21
Payer: COMMERCIAL

## 2022-08-08 ENCOUNTER — PATIENT MESSAGE (OUTPATIENT)
Dept: INTERNAL MEDICINE | Facility: CLINIC | Age: 57
End: 2022-08-08
Payer: COMMERCIAL

## 2022-08-27 ENCOUNTER — NURSE TRIAGE (OUTPATIENT)
Dept: ADMINISTRATIVE | Facility: CLINIC | Age: 57
End: 2022-08-27
Payer: COMMERCIAL

## 2022-08-27 NOTE — TELEPHONE ENCOUNTER
Reason for Disposition   [1] Pain in the upper back over the ribs (rib cage) AND [2] radiates (travels, goes) into chest   [1] Chest pain lasts > 5 minutes AND [2] age > 30 AND [3] one or more cardiac risk factors (e.g., diabetes, high blood pressure, high cholesterol, smoker, or strong family history of heart disease)    Additional Information   Negative: Passed out (i.e., lost consciousness, collapsed and was not responding)   Negative: Shock suspected (e.g., cold/pale/clammy skin, too weak to stand, low BP, rapid pulse)   Negative: Sounds like a life-threatening emergency to the triager   Negative: Major injury to the back (e.g., MVA, fall > 10 feet or 3 meters, penetrating injury, etc.)   Negative: Followed a tailbone injury   Negative: SEVERE difficulty breathing (e.g., struggling for each breath, speaks in single words)   Negative: Difficult to awaken or acting confused (e.g., disoriented, slurred speech)   Negative: Shock suspected (e.g., cold/pale/clammy skin, too weak to stand, low BP, rapid pulse)   Negative: Passed out (i.e., fainted, collapsed and was not responding)   Negative: [1] Chest pain lasts > 5 minutes AND [2] age > 44    Protocols used: Back Pain-A-AH, Chest Pain-A-AH      Carol called to say she is having severe back pain, right in the center of her back, between her shoulder blades.  Said has been having this off and on for a week or so, but last night it became much worse. She is having spasms, tightened and worsened last night and the pain began radiating through her back to her left chest.  States the back pain is stabbing and brief, but when the pain radiates to the left chest it becomes constant for some time.  The most recent occurrence, she became very nauseated and thought that she would vomit.  Feels very warm.  History of HTN. GERD.  Per [facility] triage protocol, recommend that she hang up now and call 911.  She asked if her son could bring her to the ED.  Recommended call for  immediate medical attention / 911. Message to Chiquis Messina MD, pcp.  Please contact caller directly with any additional care advice.

## 2022-08-29 NOTE — TELEPHONE ENCOUNTER
Checked in with pt, she states that she pulled a muscle in her back and side. Pt call 911, and was advised that her heart was fine and that the pain was just muscular.

## 2022-08-30 ENCOUNTER — TELEPHONE (OUTPATIENT)
Dept: INTERNAL MEDICINE | Facility: CLINIC | Age: 57
End: 2022-08-30
Payer: COMMERCIAL

## 2022-08-30 NOTE — TELEPHONE ENCOUNTER
----- Message from Tanesha Gomez sent at 8/30/2022  8:58 AM CDT -----  Regarding: olivia  Contact: patient [phone]  [email or web address]     Patient is calling for Medical Advice regarding: Seen by EMS - chest pains    How long has patient had these symptoms:Severe gas - Weekend    Pharmacy name and phone#:   [id number] - [address] AT [address] [address]  Phone: [phone] Fax: [phone]    Would like response via [employer]:  please call    Comments:

## 2022-08-30 NOTE — TELEPHONE ENCOUNTER
----- Message from Adelita Camarillo sent at 8/30/2022  9:50 AM CDT -----  Contact: self [phone]  Thank youPatient is returning a phone call.  Who left a message for the patient: Kylah  Does patient know what this is regarding:  None  Would you like a call back, or a response through your [facility] portal?:   no  Comments:  Please call patient back after 11am.    Thank you

## 2022-08-30 NOTE — TELEPHONE ENCOUNTER
Pt calling concerning gas   Pt has been burping to help relief it but she did take gas x maximum strength to help it but she stated can only be taken every 24 hours. The gas does build back up later on. When it gets too much she does experience some pain in her chest but the burping does help relief some.    Is there anything that she can take or do to help with it.

## 2022-08-30 NOTE — PROGRESS NOTES
"INTERNAL MEDICINE CLINIC - SAME DAY APPOINTMENT  Progress Note    PRESENTING HISTORY     PCP: Chiquis Messina MD    Chief Complaint/Reason for Visit:   No chief complaint on file.    History of Present Illness & ROS : Ms. Carol Collins is a 57 y.o. female.    Same day appt,.   New to me.   Pleasant lady.   Request to address several complaints today:   ` Upper back about 2 weeks ago; relates to motion.   , works from home and often with movement has been feeling 'pain to middle of back area"  Worse: turning / position changes   Better: no movement   Works on computer most of the day.   ` having 'gas' from top when sits up from laying position and just randomly throughout the day; has a history of reflux esophagitis, but stopped the PPI therapy.   No diarrhea or chronic constipation. Not being followed by GI  ` complaining of chest wall discomforts.      Review of Systems:  Eyes: denies visual changes at this time denies floaters   ENT: no nasal congestion or sore throat  Respiratory: no cough or shorness of breath  Cardiovascular: no chest pain or palpitations  Gastrointestinal: no nausea or vomiting, no abdominal pain or change in bowel habits  Genitourinary: no hematuria or dysuria; denies frequency  Hematologic/Lymphatic: no easy bruising or lymphadenopathy  Musculoskeletal: no arthralgias or myalgias  Neurological: no seizures or tremors  Endocrine: no heat or cold intolerance      PAST HISTORY:     Past Medical History:   Diagnosis Date    GERD (gastroesophageal reflux disease)     Heart palpitations 2018       Past Surgical History:   Procedure Laterality Date    BREAST BIOPSY Left 1998     SECTION, CLASSIC      HYSTERECTOMY      ovaries remain    lumpectomy Right 1998    benign       Family History   Problem Relation Age of Onset    Hypertension Mother     Diabetes Mother     Ovarian cancer Mother 73        type? survived 3m after dx    Hypertension Father     Vulvar " Cancer Sister         s/p XRT    Breast cancer Sister 60        uni/bilat? s/p double mast; s/p chemo    Hypertension Sister     No Known Problems Son     No Known Problems Son     Breast cancer Sister 52        unilat? suspected s/p chemo    Hypertension Sister     Diabetes Sister     Hypertension Sister     Diabetes Brother     Cancer Cousin         origin? abdominal?    Cancer Cousin         origin? lung?    Cancer Cousin         suspected; origin?    Stomach cancer Neg Hx     Colon cancer Neg Hx        Social History     Socioeconomic History    Marital status:    Tobacco Use    Smoking status: Never    Smokeless tobacco: Never   Substance and Sexual Activity    Alcohol use: Yes     Comment: rarely    Drug use: No    Sexual activity: Yes     Partners: Male     Social Determinants of Health     Financial Resource Strain: Low Risk     Difficulty of Paying Living Expenses: Not hard at all   Food Insecurity: No Food Insecurity    Worried About Running Out of Food in the Last Year: Never true    Ran Out of Food in the Last Year: Never true   Transportation Needs: No Transportation Needs    Lack of Transportation (Medical): No    Lack of Transportation (Non-Medical): No   Physical Activity: Insufficiently Active    Days of Exercise per Week: 1 day    Minutes of Exercise per Session: 20 min   Stress: Stress Concern Present    Feeling of Stress : To some extent   Social Connections: Unknown    Frequency of Communication with Friends and Family: Twice a week    Frequency of Social Gatherings with Friends and Family: Once a week    Active Member of Clubs or Organizations: No    Attends Club or Organization Meetings: 1 to 4 times per year    Marital Status:    Housing Stability: Low Risk     Unable to Pay for Housing in the Last Year: No    Number of Places Lived in the Last Year: 1    Unstable Housing in the Last Year: No       MEDICATIONS & ALLERGIES:     Current Outpatient Medications on File Prior to  Visit   Medication Sig Dispense Refill    amLODIPine (NORVASC) 5 MG tablet TAKE 1 TABLET(5 MG) BY MOUTH EVERY DAY 90 tablet 3    estradiol (ESTRACE) 1 MG tablet Take 1 mg by mouth once daily.      triamcinolone acetonide 0.1% (KENALOG) 0.1 % cream AAA bid 60 g 3    varicella-zoster gE-AS01B, PF, (SHINGRIX, PF,) 50 mcg/0.5 mL injection Inject into the muscle. 1 each 0     No current facility-administered medications on file prior to visit.        Review of patient's allergies indicates:   Allergen Reactions    Grass pollen-june grass standard     House dust Other (See Comments)       Medications Reconciliation:   I have reconciled the patient's home medications with the patient/family. I have updated all changes.  Refer to After-Visit Medication List.    OBJECTIVE:     Vital Signs:  There were no vitals filed for this visit.  Wt Readings from Last 3 Encounters:   10/25/21 1427 106 kg (233 lb 11 oz)   09/20/21 1307 105.7 kg (232 lb 14.7 oz)   08/06/21 0823 104.3 kg (230 lb)     There is no height or weight on file to calculate BMI.   Wt Readings from Last 3 Encounters:   08/31/22 107.6 kg (237 lb 3.4 oz)   10/25/21 106 kg (233 lb 11 oz)   09/20/21 105.7 kg (232 lb 14.7 oz)     Temp Readings from Last 3 Encounters:   09/20/21 98.3 °F (36.8 °C) (Oral)   06/10/21 98.3 °F (36.8 °C) (Temporal)   07/19/19 98.2 °F (36.8 °C) (Oral)     BP Readings from Last 3 Encounters:   08/31/22 (!) 122/90   10/25/21 (!) 126/58   09/20/21 130/77     Pulse Readings from Last 3 Encounters:   08/31/22 82   10/25/21 72   09/20/21 81       Physical Exam:  General: Well developed, well nourished. No distress.  HEENT: Head is normocephalic, atraumatic; ears are normal.   Eyes: Clear conjunctiva.  Neck: Supple, symmetrical neck; trachea midline.  Lungs: Clear to auscultation bilaterally and normal respiratory effort.  Cardiovascular: Heart with regular rate and rhythm. No murmurs, gallops or rubs  Extremities: No LE edema. Pulses 2+ and symmetric.    Abdomen: Abdomen is soft, non-tender non-distended with normal bowel sounds.  Skin: Skin color, texture, turgor normal. No rashes.  Musculoskeletal: Normal gait.   Lymph Nodes: No cervical or supraclavicular adenopathy.  Neurologic: Normal strength and tone. No focal numbness or weakness.   Psychiatric: Not depressed.      Laboratory  Lab Results   Component Value Date    WBC 4.01 08/06/2021    HGB 13.8 08/06/2021    HCT 44.1 08/06/2021     08/06/2021    CHOL 206 (H) 08/06/2021    TRIG 81 08/06/2021    HDL 46 08/06/2021    ALT 23 08/06/2021    AST 22 08/06/2021     08/06/2021    K 4.2 08/06/2021     08/06/2021    CREATININE 0.8 08/06/2021    BUN 9 08/06/2021    CO2 27 08/06/2021    TSH 1.605 08/06/2021    HGBA1C 5.7 (H) 02/27/2019     EGD  2017  Impression:           - Widely patent and non-obstructing Schatzki ring.                         - 1 cm sliding hiatal hernia.                         - Normal gastric body and antrum.                         - Normal examined duodenum.                         - No specimens collected.   Recommendation:       - Discharge patient to home.                         - Patient has a contact number available for                         emergencies. The signs and symptoms of potential                         delayed complications were discussed with the                         patient. Return to normal activities tomorrow.                         Written discharge instructions were provided to the                         patient.                         - Resume previous diet.                         - Continue present medications.                         - Return to GI clinic.   Attending Participation:        I personally performed the entire procedure.   Pernell Fernandes MD   10/23/2017 3:05:18 PM   This report has been verified and signed electronically.   Number of Addenda: 0   Note Initiated On: 10/23/2017 2:30 PM   Estimated Blood Loss: Estimated blood  loss: none.        This report has been verified and signed electronically.      Specimen Collected: 10/23/17 14:30 Last Resulted: 10/23/17 15:17          ASSESSMENT & PLAN:     Same day appt.     Thoracic back pain, unspecified back pain laterality, unspecified chronicity  Muscle hypertrophy  -     X-Ray Thoracic Spine AP Lateral; Future; Expected date: 08/31/2022  -     Echo; Future  -     TSH; Future; Expected date: 08/31/2022  -     Comprehensive Metabolic Panel; Future; Expected date: 08/31/2022  -     CBC Auto Differential; Future; Expected date: 08/31/2022  -     Magnesium; Future; Expected date: 08/31/2022  -     methocarbamoL (ROBAXIN) 500 MG Tab; Take 1 tablet (500 mg total) by mouth 3 (three) times daily. for 10 days  Dispense: 60 tablet; Refill: 0      Essential hypertension  Today: 122/90   -     IN OFFICE EKG 12-LEAD (to [place])  -     Ambulatory referral/consult to Cardiology; Future; Expected date: 09/07/2022  ` Norvasc       LVH (left ventricular hypertrophy)  Suspect in the setting of long standing HTN; ? Control  Heart palpitations  -     IN OFFICE EKG 12-LEAD (to [place])  -     Ambulatory referral/consult to Cardiology; Future; Expected date: 09/07/2022 (? Need for stress test)  -     Echo; Future  -     TSH; Future; Expected date: 08/31/2022  -     Comprehensive Metabolic Panel; Future; Expected date: 08/31/2022  -     CBC Auto Differential; Future; Expected date: 08/31/2022  -     Magnesium; Future; Expected date: 08/31/2022      History of GERD, non obstructing Schatzki's ring  Hiatal hernia  Symptoms of Dyspepsia   (? If may be cause for chest wall discomforts)  -     Ambulatory referral/consult to Gastroenterology; Future; Expected date: 09/07/2022  (Notations made to EGD in 2017, recommendations to follow up with GI, ? Missed; could not locate return appt note)  -     pantoprazole (PROTONIX) 20 MG tablet; Take 2 tabs by mouth daily x 5 days, then 1 tab by mouth daily  Dispense: 90 tablet;  Refill: 0    *Advised to the ER if has recurrent chest wall discomforts for more emergent medical care.     Future Appointments   Date Time Provider Department Center   8/31/2022  5:45 PM [facility] OIC-XRAY [facility]   8/31/2022  6:00 PM LAB, APPOINTMENT [facility] INTMED [facility] LAB IM Juanjose Brice PCW   9/2/2022  2:30 PM [facility] TANSEY MAMMO2 [facility] MAMMO [facility]   9/6/2022  5:30 PM ECHO, [facility] ECHOSTR [facility]   9/9/2022 10:40 AM Mustapha Vasquez MD [facility] CARDIO [facility]   10/17/2022  2:20 PM Ruchi Mcdonald MD [facility] DERM [facility]   11/28/2022  9:00 AM Jm Emmanuel MD [facility] GASTRO [facility]   11/29/2022  2:30 PM Chiquis Messina MD [facility] Juanjose annie [facility]        Medication List            Accurate as of August 31, 2022  5:38 PM. If you have any questions, ask your nurse or doctor.                START taking these medications      methocarbamoL 500 MG Tab  Commonly known as: ROBAXIN  Take 1 tablet (500 mg total) by mouth 3 (three) times daily. for 10 days  Started by: JEREMIAH Gray     pantoprazole 20 MG tablet  Commonly known as: PROTONIX  Take 2 tabs by mouth daily x 5 days, then 1 tab by mouth daily  Started by: JEREMIAH Gray            CONTINUE taking these medications      amLODIPine 5 MG tablet  Commonly known as: NORVASC  TAKE 1 TABLET(5 MG) BY MOUTH EVERY DAY     estradioL 1 MG tablet  Commonly known as: ESTRACE     SHINGRIX (PF) 50 mcg/0.5 mL injection  Generic drug: varicella-zoster gE-AS01B (PF)  Inject into the muscle.     triamcinolone acetonide 0.1% 0.1 % cream  Commonly known as: KENALOG  AAA bid               Where to Get Your Medications        These medications were sent to [id number] - [address] AT [address] [address]      Phone: [phone]   methocarbamoL 500 MG Tab  pantoprazole 20 MG tablet       Signing Physician:  Callie Johns,  FNP

## 2022-08-31 ENCOUNTER — HOSPITAL ENCOUNTER (OUTPATIENT)
Dept: RADIOLOGY | Facility: HOSPITAL | Age: 57
Discharge: HOME | End: 2022-08-31
Attending: NURSE PRACTITIONER
Payer: COMMERCIAL

## 2022-08-31 ENCOUNTER — OFFICE VISIT (OUTPATIENT)
Dept: INTERNAL MEDICINE | Facility: CLINIC | Age: 57
End: 2022-08-31
Payer: COMMERCIAL

## 2022-08-31 VITALS
BODY MASS INDEX: 39.52 KG/M2 | WEIGHT: 237.19 LBS | OXYGEN SATURATION: 96 % | SYSTOLIC BLOOD PRESSURE: 122 MMHG | HEIGHT: 65 IN | DIASTOLIC BLOOD PRESSURE: 90 MMHG | HEART RATE: 82 BPM

## 2022-08-31 DIAGNOSIS — K21.9 GASTROESOPHAGEAL REFLUX DISEASE, UNSPECIFIED WHETHER ESOPHAGITIS PRESENT: ICD-10-CM

## 2022-08-31 DIAGNOSIS — K21.9 GASTROESOPHAGEAL REFLUX DISEASE WITHOUT ESOPHAGITIS: ICD-10-CM

## 2022-08-31 DIAGNOSIS — M62.89 MUSCLE HYPERTROPHY: ICD-10-CM

## 2022-08-31 DIAGNOSIS — K44.9 HIATAL HERNIA: ICD-10-CM

## 2022-08-31 DIAGNOSIS — R00.2 HEART PALPITATIONS: ICD-10-CM

## 2022-08-31 DIAGNOSIS — K22.2 SCHATZKI'S RING: ICD-10-CM

## 2022-08-31 DIAGNOSIS — I10 ESSENTIAL HYPERTENSION: ICD-10-CM

## 2022-08-31 DIAGNOSIS — M54.6 THORACIC BACK PAIN, UNSPECIFIED BACK PAIN LATERALITY, UNSPECIFIED CHRONICITY: ICD-10-CM

## 2022-08-31 DIAGNOSIS — Z87.19 HISTORY OF ESOPHAGITIS: ICD-10-CM

## 2022-08-31 DIAGNOSIS — M54.6 THORACIC BACK PAIN, UNSPECIFIED BACK PAIN LATERALITY, UNSPECIFIED CHRONICITY: Primary | ICD-10-CM

## 2022-08-31 DIAGNOSIS — I51.7 LVH (LEFT VENTRICULAR HYPERTROPHY): ICD-10-CM

## 2022-08-31 PROCEDURE — 72050 X-RAY EXAM NECK SPINE 4/5VWS: CPT | Mod: 26,,, | Performed by: RADIOLOGY

## 2022-08-31 PROCEDURE — 72070 XR THORACIC SPINE AP LATERAL: ICD-10-PCS | Mod: 26,,, | Performed by: RADIOLOGY

## 2022-08-31 PROCEDURE — 3074F SYST BP LT 130 MM HG: CPT | Mod: CPTII,S$GLB,, | Performed by: NURSE PRACTITIONER

## 2022-08-31 PROCEDURE — 99999 PR PBB SHADOW E&M-EST. PATIENT-LVL V: CPT | Mod: PBBFAC,,, | Performed by: NURSE PRACTITIONER

## 2022-08-31 PROCEDURE — 99214 OFFICE O/P EST MOD 30 MIN: CPT | Mod: S$GLB,,, | Performed by: NURSE PRACTITIONER

## 2022-08-31 PROCEDURE — 93005 ELECTROCARDIOGRAM TRACING: CPT | Mod: S$GLB,,, | Performed by: NURSE PRACTITIONER

## 2022-08-31 PROCEDURE — 72070 X-RAY EXAM THORAC SPINE 2VWS: CPT | Mod: TC

## 2022-08-31 PROCEDURE — 72050 XR CERVICAL SPINE COMPLETE 5 VIEW: ICD-10-PCS | Mod: 26,,, | Performed by: RADIOLOGY

## 2022-08-31 PROCEDURE — 3080F DIAST BP >= 90 MM HG: CPT | Mod: CPTII,S$GLB,, | Performed by: NURSE PRACTITIONER

## 2022-08-31 PROCEDURE — 3008F PR BODY MASS INDEX (BMI) DOCUMENTED: ICD-10-PCS | Mod: CPTII,S$GLB,, | Performed by: NURSE PRACTITIONER

## 2022-08-31 PROCEDURE — 1159F MED LIST DOCD IN RCRD: CPT | Mod: CPTII,S$GLB,, | Performed by: NURSE PRACTITIONER

## 2022-08-31 PROCEDURE — 93010 EKG 12-LEAD: ICD-10-PCS | Mod: S$GLB,,, | Performed by: INTERNAL MEDICINE

## 2022-08-31 PROCEDURE — 99999 PR PBB SHADOW E&M-EST. PATIENT-LVL V: ICD-10-PCS | Mod: PBBFAC,,, | Performed by: NURSE PRACTITIONER

## 2022-08-31 PROCEDURE — 93005 EKG 12-LEAD: ICD-10-PCS | Mod: S$GLB,,, | Performed by: NURSE PRACTITIONER

## 2022-08-31 PROCEDURE — 93010 ELECTROCARDIOGRAM REPORT: CPT | Mod: S$GLB,,, | Performed by: INTERNAL MEDICINE

## 2022-08-31 PROCEDURE — 1159F PR MEDICATION LIST DOCUMENTED IN MEDICAL RECORD: ICD-10-PCS | Mod: CPTII,S$GLB,, | Performed by: NURSE PRACTITIONER

## 2022-08-31 PROCEDURE — 3074F PR MOST RECENT SYSTOLIC BLOOD PRESSURE < 130 MM HG: ICD-10-PCS | Mod: CPTII,S$GLB,, | Performed by: NURSE PRACTITIONER

## 2022-08-31 PROCEDURE — 72050 X-RAY EXAM NECK SPINE 4/5VWS: CPT | Mod: TC

## 2022-08-31 PROCEDURE — 72070 X-RAY EXAM THORAC SPINE 2VWS: CPT | Mod: 26,,, | Performed by: RADIOLOGY

## 2022-08-31 PROCEDURE — 99214 PR OFFICE/OUTPT VISIT, EST, LEVL IV, 30-39 MIN: ICD-10-PCS | Mod: S$GLB,,, | Performed by: NURSE PRACTITIONER

## 2022-08-31 PROCEDURE — 3080F PR MOST RECENT DIASTOLIC BLOOD PRESSURE >= 90 MM HG: ICD-10-PCS | Mod: CPTII,S$GLB,, | Performed by: NURSE PRACTITIONER

## 2022-08-31 PROCEDURE — 3008F BODY MASS INDEX DOCD: CPT | Mod: CPTII,S$GLB,, | Performed by: NURSE PRACTITIONER

## 2022-08-31 RX ORDER — PANTOPRAZOLE SODIUM 20 MG/1
TABLET, DELAYED RELEASE ORAL
Qty: 90 TABLET | Refills: 0 | Status: SHIPPED | OUTPATIENT
Start: 2022-08-31 | End: 2022-09-01

## 2022-08-31 RX ORDER — METHOCARBAMOL 500 MG/1
500 TABLET, FILM COATED ORAL 3 TIMES DAILY
Qty: 60 TABLET | Refills: 0 | Status: SHIPPED | OUTPATIENT
Start: 2022-08-31 | End: 2022-09-10

## 2022-09-01 RX ORDER — PANTOPRAZOLE SODIUM 20 MG/1
TABLET, DELAYED RELEASE ORAL
Qty: 95 TABLET | Refills: 0 | Status: SHIPPED | OUTPATIENT
Start: 2022-09-01 | End: 2022-11-29

## 2022-09-01 NOTE — TELEPHONE ENCOUNTER
----- Message from JEREMIAH Gusman sent at 9/1/2022  5:15 AM CDT -----  Please contact the patient and let them know that their results were fine and do not require any change in treatment.    #Will update her via portal when her Xray reading has resulted from yesterday.     ina

## 2022-09-01 NOTE — TELEPHONE ENCOUNTER
Attempted to call pt back about labs but no response at this time. Left VM to call office when available.

## 2022-09-02 ENCOUNTER — HOSPITAL ENCOUNTER (OUTPATIENT)
Dept: RADIOLOGY | Facility: HOSPITAL | Age: 57
Discharge: HOME | End: 2022-09-02
Attending: OBSTETRICS & GYNECOLOGY
Payer: COMMERCIAL

## 2022-09-02 DIAGNOSIS — Z12.31 VISIT FOR SCREENING MAMMOGRAM: ICD-10-CM

## 2022-09-02 PROCEDURE — 77067 SCR MAMMO BI INCL CAD: CPT | Mod: 26,,, | Performed by: RADIOLOGY

## 2022-09-02 PROCEDURE — 77063 MAMMO DIGITAL SCREENING BILAT WITH TOMO: ICD-10-PCS | Mod: 26,,, | Performed by: RADIOLOGY

## 2022-09-02 PROCEDURE — 77063 BREAST TOMOSYNTHESIS BI: CPT | Mod: TC

## 2022-09-02 PROCEDURE — 77063 BREAST TOMOSYNTHESIS BI: CPT | Mod: 26,,, | Performed by: RADIOLOGY

## 2022-09-02 PROCEDURE — 77067 MAMMO DIGITAL SCREENING BILAT WITH TOMO: ICD-10-PCS | Mod: 26,,, | Performed by: RADIOLOGY

## 2022-09-06 ENCOUNTER — HOSPITAL ENCOUNTER (OUTPATIENT)
Dept: CARDIOLOGY | Facility: HOSPITAL | Age: 57
Discharge: HOME | End: 2022-09-06
Attending: NURSE PRACTITIONER
Payer: COMMERCIAL

## 2022-09-06 VITALS
DIASTOLIC BLOOD PRESSURE: 84 MMHG | HEART RATE: 62 BPM | HEIGHT: 65 IN | BODY MASS INDEX: 39.49 KG/M2 | SYSTOLIC BLOOD PRESSURE: 132 MMHG | WEIGHT: 237 LBS

## 2022-09-06 DIAGNOSIS — M54.6 THORACIC BACK PAIN, UNSPECIFIED BACK PAIN LATERALITY, UNSPECIFIED CHRONICITY: ICD-10-CM

## 2022-09-06 DIAGNOSIS — R00.2 HEART PALPITATIONS: ICD-10-CM

## 2022-09-06 DIAGNOSIS — K21.9 GASTROESOPHAGEAL REFLUX DISEASE WITHOUT ESOPHAGITIS: ICD-10-CM

## 2022-09-06 DIAGNOSIS — I51.7 LVH (LEFT VENTRICULAR HYPERTROPHY): ICD-10-CM

## 2022-09-06 DIAGNOSIS — I10 ESSENTIAL HYPERTENSION: ICD-10-CM

## 2022-09-06 DIAGNOSIS — K44.9 HIATAL HERNIA: ICD-10-CM

## 2022-09-06 PROCEDURE — 93306 TTE W/DOPPLER COMPLETE: CPT

## 2022-09-06 PROCEDURE — 93306 TTE W/DOPPLER COMPLETE: CPT | Mod: 26,,, | Performed by: INTERNAL MEDICINE

## 2022-09-06 PROCEDURE — 93306 ECHO (CUPID ONLY): ICD-10-PCS | Mod: 26,,, | Performed by: INTERNAL MEDICINE

## 2022-09-07 LAB
ASCENDING AORTA: 2.54 CM
AV INDEX (PROSTH): 0.93
AV MEAN GRADIENT: 4 MMHG
AV PEAK GRADIENT: 7 MMHG
AV VALVE AREA: 2.56 CM2
AV VELOCITY RATIO: 0.89
BSA FOR ECHO PROCEDURE: 2.22 M2
CV ECHO LV RWT: 0.37 CM
DOP CALC AO PEAK VEL: 1.3 M/S
DOP CALC AO VTI: 29.51 CM
DOP CALC LVOT AREA: 2.7 CM2
DOP CALC LVOT DIAMETER: 1.87 CM
DOP CALC LVOT PEAK VEL: 1.16 M/S
DOP CALC LVOT STROKE VOLUME: 75.46 CM3
DOP CALCLVOT PEAK VEL VTI: 27.49 CM
E WAVE DECELERATION TIME: 158.02 MSEC
E/A RATIO: 1.09
E/E' RATIO: 8.09 M/S
ECHO LV POSTERIOR WALL: 0.75 CM (ref 0.6–1.1)
EJECTION FRACTION: 60 %
FRACTIONAL SHORTENING: 37 % (ref 28–44)
INTERVENTRICULAR SEPTUM: 0.94 CM (ref 0.6–1.1)
LA MAJOR: 4.45 CM
LA MINOR: 4.53 CM
LA WIDTH: 3.36 CM
LEFT ATRIUM SIZE: 3.43 CM
LEFT ATRIUM VOLUME INDEX MOD: 16.5 ML/M2
LEFT ATRIUM VOLUME INDEX: 20.6 ML/M2
LEFT ATRIUM VOLUME MOD: 35.06 CM3
LEFT ATRIUM VOLUME: 43.98 CM3
LEFT INTERNAL DIMENSION IN SYSTOLE: 2.54 CM (ref 2.1–4)
LEFT VENTRICLE DIASTOLIC VOLUME INDEX: 34.04 ML/M2
LEFT VENTRICLE DIASTOLIC VOLUME: 72.51 ML
LEFT VENTRICLE MASS INDEX: 48 G/M2
LEFT VENTRICLE SYSTOLIC VOLUME INDEX: 10.9 ML/M2
LEFT VENTRICLE SYSTOLIC VOLUME: 23.16 ML
LEFT VENTRICULAR INTERNAL DIMENSION IN DIASTOLE: 4.06 CM (ref 3.5–6)
LEFT VENTRICULAR MASS: 103.09 G
LV LATERAL E/E' RATIO: 6.85 M/S
LV SEPTAL E/E' RATIO: 9.89 M/S
MV A" WAVE DURATION": 14.27 MSEC
MV PEAK A VEL: 0.82 M/S
MV PEAK E VEL: 0.89 M/S
MV STENOSIS PRESSURE HALF TIME: 45.83 MS
MV VALVE AREA P 1/2 METHOD: 4.8 CM2
PISA TR MAX VEL: 2.19 M/S
PULM VEIN S/D RATIO: 1.16
PV PEAK D VEL: 0.44 M/S
PV PEAK S VEL: 0.51 M/S
RA MAJOR: 4.58 CM
RA PRESSURE: 3 MMHG
RA WIDTH: 3.25 CM
RIGHT VENTRICULAR END-DIASTOLIC DIMENSION: 3.18 CM
RV TISSUE DOPPLER FREE WALL SYSTOLIC VELOCITY 1 (APICAL 4 CHAMBER VIEW): 12.21 CM/S
SINUS: 2.38 CM
STJ: 2.09 CM
TDI LATERAL: 0.13 M/S
TDI SEPTAL: 0.09 M/S
TDI: 0.11 M/S
TR MAX PG: 19 MMHG
TRICUSPID ANNULAR PLANE SYSTOLIC EXCURSION: 2.3 CM
TV REST PULMONARY ARTERY PRESSURE: 22 MMHG

## 2022-09-08 ENCOUNTER — PATIENT MESSAGE (OUTPATIENT)
Dept: INTERNAL MEDICINE | Facility: CLINIC | Age: 57
End: 2022-09-08
Payer: COMMERCIAL

## 2022-09-09 ENCOUNTER — OFFICE VISIT (OUTPATIENT)
Dept: CARDIOLOGY | Facility: CLINIC | Age: 57
End: 2022-09-09
Payer: COMMERCIAL

## 2022-09-09 VITALS
HEART RATE: 68 BPM | WEIGHT: 236.31 LBS | SYSTOLIC BLOOD PRESSURE: 132 MMHG | BODY MASS INDEX: 39.37 KG/M2 | HEIGHT: 65 IN | DIASTOLIC BLOOD PRESSURE: 61 MMHG

## 2022-09-09 DIAGNOSIS — E78.2 MIXED HYPERLIPIDEMIA: ICD-10-CM

## 2022-09-09 DIAGNOSIS — R07.9 CHEST PAIN, UNSPECIFIED TYPE: Primary | ICD-10-CM

## 2022-09-09 DIAGNOSIS — I10 ESSENTIAL HYPERTENSION: ICD-10-CM

## 2022-09-09 DIAGNOSIS — R73.03 PREDIABETES: ICD-10-CM

## 2022-09-09 PROCEDURE — 1159F MED LIST DOCD IN RCRD: CPT | Mod: CPTII,S$GLB,, | Performed by: STUDENT IN AN ORGANIZED HEALTH CARE EDUCATION/TRAINING PROGRAM

## 2022-09-09 PROCEDURE — 3008F PR BODY MASS INDEX (BMI) DOCUMENTED: ICD-10-PCS | Mod: CPTII,S$GLB,, | Performed by: STUDENT IN AN ORGANIZED HEALTH CARE EDUCATION/TRAINING PROGRAM

## 2022-09-09 PROCEDURE — 1160F PR REVIEW ALL MEDS BY PRESCRIBER/CLIN PHARMACIST DOCUMENTED: ICD-10-PCS | Mod: CPTII,S$GLB,, | Performed by: STUDENT IN AN ORGANIZED HEALTH CARE EDUCATION/TRAINING PROGRAM

## 2022-09-09 PROCEDURE — 3078F PR MOST RECENT DIASTOLIC BLOOD PRESSURE < 80 MM HG: ICD-10-PCS | Mod: CPTII,S$GLB,, | Performed by: STUDENT IN AN ORGANIZED HEALTH CARE EDUCATION/TRAINING PROGRAM

## 2022-09-09 PROCEDURE — 3075F PR MOST RECENT SYSTOLIC BLOOD PRESS GE 130-139MM HG: ICD-10-PCS | Mod: CPTII,S$GLB,, | Performed by: STUDENT IN AN ORGANIZED HEALTH CARE EDUCATION/TRAINING PROGRAM

## 2022-09-09 PROCEDURE — 1160F RVW MEDS BY RX/DR IN RCRD: CPT | Mod: CPTII,S$GLB,, | Performed by: STUDENT IN AN ORGANIZED HEALTH CARE EDUCATION/TRAINING PROGRAM

## 2022-09-09 PROCEDURE — 3075F SYST BP GE 130 - 139MM HG: CPT | Mod: CPTII,S$GLB,, | Performed by: STUDENT IN AN ORGANIZED HEALTH CARE EDUCATION/TRAINING PROGRAM

## 2022-09-09 PROCEDURE — 3078F DIAST BP <80 MM HG: CPT | Mod: CPTII,S$GLB,, | Performed by: STUDENT IN AN ORGANIZED HEALTH CARE EDUCATION/TRAINING PROGRAM

## 2022-09-09 PROCEDURE — 3008F BODY MASS INDEX DOCD: CPT | Mod: CPTII,S$GLB,, | Performed by: STUDENT IN AN ORGANIZED HEALTH CARE EDUCATION/TRAINING PROGRAM

## 2022-09-09 PROCEDURE — 99204 PR OFFICE/OUTPT VISIT, NEW, LEVL IV, 45-59 MIN: ICD-10-PCS | Mod: S$GLB,,, | Performed by: STUDENT IN AN ORGANIZED HEALTH CARE EDUCATION/TRAINING PROGRAM

## 2022-09-09 PROCEDURE — 99999 PR PBB SHADOW E&M-EST. PATIENT-LVL IV: CPT | Mod: PBBFAC,,, | Performed by: STUDENT IN AN ORGANIZED HEALTH CARE EDUCATION/TRAINING PROGRAM

## 2022-09-09 PROCEDURE — 99204 OFFICE O/P NEW MOD 45 MIN: CPT | Mod: S$GLB,,, | Performed by: STUDENT IN AN ORGANIZED HEALTH CARE EDUCATION/TRAINING PROGRAM

## 2022-09-09 PROCEDURE — 99999 PR PBB SHADOW E&M-EST. PATIENT-LVL IV: ICD-10-PCS | Mod: PBBFAC,,, | Performed by: STUDENT IN AN ORGANIZED HEALTH CARE EDUCATION/TRAINING PROGRAM

## 2022-09-09 PROCEDURE — 1159F PR MEDICATION LIST DOCUMENTED IN MEDICAL RECORD: ICD-10-PCS | Mod: CPTII,S$GLB,, | Performed by: STUDENT IN AN ORGANIZED HEALTH CARE EDUCATION/TRAINING PROGRAM

## 2022-09-09 NOTE — PROGRESS NOTES
Cardiology Clinic Note  Reason for Visit: Chest pain    HPI:   Pt is a 58yo F with pmhx of HTN, HLD, preDM who presents to clinic for chest pain evaluation    She says she had an episode of chest pain last week.  [place]aid [place] and someone [place] into her.  She almost fell and started to have back pain.  The next day she was reaching up in her cabinet and picking up a rain bucket when she said she got bilateral rib pain.  Denies any shortness of breath.  Pain worse with extension of the arms.  She called 911 who reportedly did an EKG which was negative and said she was fine and did not need to come to the hospital.  She called her PCP who recommended a cardiac evaluation.  Denies any irregular heart beats, further chest pain with exertion, LE edema or orthopnea.  Had an echo done on 2022 which was normal.      ROS:    Constitution: Negative for fever, chills, weight loss or gain.   HENT: Negative for sore throat, rhinorrhea, or headache.  Eyes: Negative for blurred or double vision.   Cardiovascular: See above  Pulmonary: Negative for SOB   Gastrointestinal: Negative for abdominal pain, nausea, vomiting, or diarrhea.   : Negative for dysuria.   Neurological: Negative for focal weakness or sensory changes.  PMH:     Past Medical History:   Diagnosis Date    GERD (gastroesophageal reflux disease)     Heart palpitations 2018     Past Surgical History:   Procedure Laterality Date    BREAST BIOPSY Left      SECTION, CLASSIC      HYSTERECTOMY      ovaries remain    lumpectomy Right     benign     Allergies:     Review of patient's allergies indicates:   Allergen Reactions    Grass pollen- grass standard     House dust Other (See Comments)     Medications:     Current Outpatient Medications on File Prior to Visit   Medication Sig Dispense Refill    amLODIPine (NORVASC) 5 MG tablet TAKE 1 TABLET(5 MG) BY MOUTH EVERY DAY 90 tablet 3    estradiol (ESTRACE) 1 MG tablet Take  "1 mg by mouth once daily.      methocarbamoL (ROBAXIN) 500 MG Tab Take 1 tablet (500 mg total) by mouth 3 (three) times daily. for 10 days (Patient not taking: Reported on 9/9/2022) 60 tablet 0    pantoprazole (PROTONIX) 20 MG tablet TAKE 2 TABLETS BY MOUTH DAILY FOR 5 DAYS THEN TAKE 1 TABLET BY MOUTH DAILY (Patient not taking: Reported on 9/9/2022) 95 tablet 0    triamcinolone acetonide 0.1% (KENALOG) 0.1 % cream AAA bid (Patient not taking: Reported on 9/9/2022) 60 g 3    varicella-zoster gE-AS01B, PF, (SHINGRIX, PF,) 50 mcg/0.5 mL injection Inject into the muscle. (Patient not taking: Reported on 9/9/2022) 1 each 0     No current facility-administered medications on file prior to visit.     Social History:     Social History     Tobacco Use    Smoking status: Never    Smokeless tobacco: Never   Substance Use Topics    Alcohol use: Yes     Comment: rarely     Family History:     Family History   Problem Relation Age of Onset    Hypertension Mother     Diabetes Mother     Ovarian cancer Mother 73        type? survived 3m after dx    Hypertension Father     Vulvar Cancer Sister         s/p XRT    Breast cancer Sister 60        uni/bilat? s/p double mast; s/p chemo    Hypertension Sister     No Known Problems Son     No Known Problems Son     Breast cancer Sister 52        unilat? suspected s/p chemo    Hypertension Sister     Diabetes Sister     Hypertension Sister     Diabetes Brother     Cancer Cousin         origin? abdominal?    Cancer Cousin         origin? lung?    Cancer Cousin         suspected; origin?    Stomach cancer Neg Hx     Colon cancer Neg Hx      Physical Exam:   /61 (BP Location: Left arm, Patient Position: Sitting, BP Method: Large (Automatic))   Pulse 68   Ht 5' 5" (1.651 m)   Wt 107.2 kg (236 lb 5.3 oz)   BMI 39.33 kg/m²    Wt Readings from Last 4 Encounters:   09/09/22 107.2 kg (236 lb 5.3 oz)   09/06/22 107.5 kg (237 lb)   08/31/22 107.6 kg (237 lb 3.4 oz)   10/25/21 106 kg (233 lb " 11 oz)         Constitutional: No distress, obese, conversant  HEENT: Sclera anicteric, PERRLA, EOMI  Neck: No JVD, no masses, good movement  CV: RRR, S1 and S2 normal, no additional heart sounds or murmurs. Pulses 2+ and equal bilaterally in radial arteries, Arnav's normal on right. Distal pulses are 2+ and equal in the femoral, DP and PT areas bilaterally  Pulm: Clear to auscultation bilaterally with symmetrical expansion. Chest wall palpated for reproduction of pain symptoms, and no pain was able to be produced on palpation or resistance exercises  GI: Abdomen soft, non-tender, good bowel sounds  Extremities: Both extremities intact and grossly normal, skin is warm, no edema noted  Skin: No ecchymosis, erythema, or ulcers  Psych: AOx3, appropriate affect  Neuro: CNII-XII intact, no focal deficits      Labs:     Lab Results   Component Value Date     08/31/2022     08/31/2022    K 4.3 08/31/2022    K 4.3 08/31/2022     08/31/2022     08/31/2022    CO2 25 08/31/2022    CO2 25 08/31/2022    BUN 12 08/31/2022    BUN 12 08/31/2022    CREATININE 0.9 08/31/2022    CREATININE 0.9 08/31/2022    ANIONGAP 11 08/31/2022    ANIONGAP 11 08/31/2022     Lab Results   Component Value Date    HGBA1C 5.7 (H) 02/27/2019     No results found for: BNP, BNPTRIAGEBLO Lab Results   Component Value Date    WBC 5.72 08/31/2022    HGB 14.0 08/31/2022    HCT 44.2 08/31/2022     08/31/2022    GRAN 2.6 08/31/2022    GRAN 45.3 08/31/2022     Lab Results   Component Value Date    CHOL 206 (H) 08/06/2021    HDL 46 08/06/2021    LDLCALC 143.8 08/06/2021    TRIG 81 08/06/2021          Imaging:         EF   Date Value Ref Range Status   09/06/2022 60 % Final     TTE: 9/6/2022  The left ventricle is normal in size with normal systolic function. The estimated ejection fraction is 60%.  Normal right ventricular size with normal right ventricular systolic function.  Normal left ventricular diastolic function.  The  estimated PA systolic pressure is 22 mmHg.  Normal central venous pressure (3 mmHg).    Assessment:    Pt is a 56yo F with pmhx of HTN, HLD, preDM who presents to clinic for chest pain evaluation     Plan:     #Chest pain:  This is likely MSK in origin and not cardiac in origin  - no additional cardiac testing needed (especially after 9/6/2022 TTE normal)  - RTC PRN    #HTN:  BP in clinic today was 132/61.  Monitor at home  - continue amlodipine 5mg qD  - recommend her PCP enroll her into digital HTN program  - keep a home BP diary and bring to next visit    #HLD:  Last lipid panel showing hypercholesterolemia  - discussed treatment options and pt would like to continue to exercise  - discussed mediterranean diet  - discussed exercise therapy based on 150-min per week AHA recommendations for moderate intensity exercise/75 min for high-intensity exercise    Signed:  Mustapha Vasquez MD  Cardiology Fellow  Pager - [phone]  [facility]  9/9/2022 11:17 AM

## 2022-10-17 ENCOUNTER — OFFICE VISIT (OUTPATIENT)
Dept: DERMATOLOGY | Facility: CLINIC | Age: 57
End: 2022-10-17
Payer: COMMERCIAL

## 2022-10-17 DIAGNOSIS — R20.2 NOTALGIA PARESTHETICA: ICD-10-CM

## 2022-10-17 DIAGNOSIS — L82.1 SK (SEBORRHEIC KERATOSIS): Primary | ICD-10-CM

## 2022-10-17 PROCEDURE — 99204 OFFICE O/P NEW MOD 45 MIN: CPT | Mod: S$GLB,,, | Performed by: DERMATOLOGY

## 2022-10-17 PROCEDURE — 99999 PR PBB SHADOW E&M-EST. PATIENT-LVL III: ICD-10-PCS | Mod: PBBFAC,,, | Performed by: DERMATOLOGY

## 2022-10-17 PROCEDURE — 1159F MED LIST DOCD IN RCRD: CPT | Mod: CPTII,S$GLB,, | Performed by: DERMATOLOGY

## 2022-10-17 PROCEDURE — 1160F PR REVIEW ALL MEDS BY PRESCRIBER/CLIN PHARMACIST DOCUMENTED: ICD-10-PCS | Mod: CPTII,S$GLB,, | Performed by: DERMATOLOGY

## 2022-10-17 PROCEDURE — 1159F PR MEDICATION LIST DOCUMENTED IN MEDICAL RECORD: ICD-10-PCS | Mod: CPTII,S$GLB,, | Performed by: DERMATOLOGY

## 2022-10-17 PROCEDURE — 1160F RVW MEDS BY RX/DR IN RCRD: CPT | Mod: CPTII,S$GLB,, | Performed by: DERMATOLOGY

## 2022-10-17 PROCEDURE — 99999 PR PBB SHADOW E&M-EST. PATIENT-LVL III: CPT | Mod: PBBFAC,,, | Performed by: DERMATOLOGY

## 2022-10-17 PROCEDURE — 99204 PR OFFICE/OUTPT VISIT, NEW, LEVL IV, 45-59 MIN: ICD-10-PCS | Mod: S$GLB,,, | Performed by: DERMATOLOGY

## 2022-10-17 NOTE — PATIENT INSTRUCTIONS
SEBORRHEIC KERATOSES        What causes seborrheic keratoses?    Seborrheic keratoses are harmless, common skin growths that first appear during adult life.  As time goes by, more growths appear.  Some persons have a very large number of them.  Seborrheic keratoses appear on both covered and uncovered parts of the body; they are not caused by sunlight.  The tendency to develop seborrheic keratoses is inherited.    Seborrheic keratoses are harmless and never become malignant.  They begin as slightly raised, light brown spots.  Gradually they thicken and take on a rough wartlike surface.  They slowly darken and may turn black.  These color changes are harmless.  Seborrheic keratoses are superficial and look as if they were stuck on the skin.  Persons who have had several seborrheic keratoses can usually recognize this type of benign growth.  However, if you are concerned or unsure about any growth, consult me.    Treatment    Seborrheic keratoses can easily be removed in the office.  The only reason for removing a seborrheic keratosis is your wish to get rid of it.    Your prescription has been sent to the compounding [facility]  They are located in [place] at [address] just before the [address].  If you have any questions, please call the pharmacy at [phone].  Website: [email or web address]

## 2022-10-17 NOTE — PROGRESS NOTES
Subjective:       Patient ID:  Carol Collins is a 57 y.o. female who presents for   Chief Complaint   Patient presents with    Mole     L thigh    Itching     L shoulder blade     Patient with new complaint of mole(s)  Location: L thigh  Duration: months  Symptoms: different texture  Relieving factors/Previous treatments: none    No h/o nmsc    Patient with new complaint of Itching(s)  Location: L shoulder  Duration: 1+years  Symptoms: itching   Relieving factors/Previous treatments: TAC cream - no helping            Review of Systems   Skin:  Positive for itching. Negative for rash, daily sunscreen use and activity-related sunscreen use.   Hematologic/Lymphatic: Does not bruise/bleed easily.      Objective:    Physical Exam   Constitutional: She appears well-developed and well-nourished. No distress.   Neurological: She is alert and oriented to person, place, and time. She is not disoriented.   Psychiatric: She has a normal mood and affect.   Skin:   Areas Examined (abnormalities noted in diagram):   Back Inspection Performed  LLE Inspection Performed            Diagram Legend     Erythematous scaling macule/papule c/w actinic keratosis       Vascular papule c/w angioma      Pigmented verrucoid papule/plaque c/w seborrheic keratosis      Yellow umbilicated papule c/w sebaceous hyperplasia      Irregularly shaped tan macule c/w lentigo     1-2 mm smooth white papules consistent with Milia      Movable subcutaneous cyst with punctum c/w epidermal inclusion cyst      Subcutaneous movable cyst c/w pilar cyst      Firm pink to brown papule c/w dermatofibroma      Pedunculated fleshy papule(s) c/w skin tag(s)      Evenly pigmented macule c/w junctional nevus     Mildly variegated pigmented, slightly irregular-bordered macule c/w mildly atypical nevus      Flesh colored to evenly pigmented papule c/w intradermal nevus       Pink pearly papule/plaque c/w basal cell carcinoma      Erythematous hyperkeratotic cursted  plaque c/w SCC      Surgical scar with no sign of skin cancer recurrence      Open and closed comedones      Inflammatory papules and pustules      Verrucoid papule consistent consistent with wart     Erythematous eczematous patches and plaques     Dystrophic onycholytic nail with subungual debris c/w onychomycosis     Umbilicated papule    Erythematous-base heme-crusted tan verrucoid plaque consistent with inflamed seborrheic keratosis     Erythematous Silvery Scaling Plaque c/w Psoriasis     See annotation      Assessment / Plan:        SK (seborrheic keratosis) - left thigh; macular  These are benign inherited growths without a malignant potential. Reassurance given to patient. No treatment is necessary. '      Notalgia paresthetica  Quad cream to LAPharmacy           Follow up if symptoms worsen or fail to improve.

## 2022-10-26 ENCOUNTER — OFFICE VISIT (OUTPATIENT)
Dept: GASTROENTEROLOGY | Facility: CLINIC | Age: 57
End: 2022-10-26
Payer: COMMERCIAL

## 2022-10-26 VITALS
HEART RATE: 78 BPM | BODY MASS INDEX: 39.82 KG/M2 | HEIGHT: 65 IN | SYSTOLIC BLOOD PRESSURE: 108 MMHG | WEIGHT: 239 LBS | DIASTOLIC BLOOD PRESSURE: 72 MMHG

## 2022-10-26 DIAGNOSIS — K21.9 GASTROESOPHAGEAL REFLUX DISEASE WITHOUT ESOPHAGITIS: ICD-10-CM

## 2022-10-26 PROCEDURE — 3074F SYST BP LT 130 MM HG: CPT | Mod: CPTII,S$GLB,, | Performed by: INTERNAL MEDICINE

## 2022-10-26 PROCEDURE — 3078F DIAST BP <80 MM HG: CPT | Mod: CPTII,S$GLB,, | Performed by: INTERNAL MEDICINE

## 2022-10-26 PROCEDURE — 1159F MED LIST DOCD IN RCRD: CPT | Mod: CPTII,S$GLB,, | Performed by: INTERNAL MEDICINE

## 2022-10-26 PROCEDURE — 1159F PR MEDICATION LIST DOCUMENTED IN MEDICAL RECORD: ICD-10-PCS | Mod: CPTII,S$GLB,, | Performed by: INTERNAL MEDICINE

## 2022-10-26 PROCEDURE — 3078F PR MOST RECENT DIASTOLIC BLOOD PRESSURE < 80 MM HG: ICD-10-PCS | Mod: CPTII,S$GLB,, | Performed by: INTERNAL MEDICINE

## 2022-10-26 PROCEDURE — 99203 OFFICE O/P NEW LOW 30 MIN: CPT | Mod: S$GLB,,, | Performed by: INTERNAL MEDICINE

## 2022-10-26 PROCEDURE — 99203 PR OFFICE/OUTPT VISIT, NEW, LEVL III, 30-44 MIN: ICD-10-PCS | Mod: S$GLB,,, | Performed by: INTERNAL MEDICINE

## 2022-10-26 PROCEDURE — 99999 PR PBB SHADOW E&M-EST. PATIENT-LVL III: ICD-10-PCS | Mod: PBBFAC,,, | Performed by: INTERNAL MEDICINE

## 2022-10-26 PROCEDURE — 3074F PR MOST RECENT SYSTOLIC BLOOD PRESSURE < 130 MM HG: ICD-10-PCS | Mod: CPTII,S$GLB,, | Performed by: INTERNAL MEDICINE

## 2022-10-26 PROCEDURE — 1160F RVW MEDS BY RX/DR IN RCRD: CPT | Mod: CPTII,S$GLB,, | Performed by: INTERNAL MEDICINE

## 2022-10-26 PROCEDURE — 99999 PR PBB SHADOW E&M-EST. PATIENT-LVL III: CPT | Mod: PBBFAC,,, | Performed by: INTERNAL MEDICINE

## 2022-10-26 PROCEDURE — 1160F PR REVIEW ALL MEDS BY PRESCRIBER/CLIN PHARMACIST DOCUMENTED: ICD-10-PCS | Mod: CPTII,S$GLB,, | Performed by: INTERNAL MEDICINE

## 2022-10-26 NOTE — PROGRESS NOTES
Reason for visit: GERD    HPI:  is a 57 year old lady with GERD. Previously seen in GI clinic in 2017. Has been having symptoms on and off for a few years. Describes intermittent bouts of pyrosis with rare episodes of acid regurgitation. Usually eats her last meal at 7p and goes to bed at 10:30p. Food triggers include red sauce and spicy foods that she avoids. Has rare caffeine intake . Occasional alcohol and soda. Has used Pepcid and chewable gaviscon as needed only. Colonoscopy at age 49 - no polyps - at [facility]. EGD in 2017 revealed widely patent and non-obstructing Schatzki ring along with a small 1 cm sliding hiatal hernia. Denies any dysphagia, odynophagia, nausea or vomiting. No abdominal pains, changes in bowel pattern, blood/ mucus in stool or unintentional weight loss. No melena or maroon stools. No recent changes in diet or medications. No family history of IBD, Celiac disease or GI malignancy. No regular NSAIDs, alcohol or tobacco use. No recent antibiotic use, travels or sick contacts. No prior history of C.diff.History of C section and partial hysterectomy for fibroids.    Past medical, surgical, social and family history reviewed in epic    Medication allergies reviewed in epic    Review of systems:    Constitutional:  No fever, no chills, no weight loss, appetite is normal  Eyes:  No visual changes or red eyes  ENT:  No odynophagia or hoarseness of voice  Cardiovascular:  No angina or palpitation  Respiratory:  No shortness breath or wheezing  Genitourinary:  No dysuria or frequency, has nocturia  Musculoskeletal:  No myalgias or arthralgias  Skin:  No pruritus or eczema  Neurologic:  No headache or seizures  Psychiatric:  No anxiety depression  Gastrointestinal:  See HPI    Physical exam:  Vitals see epic, awake, alert, oriented x3 in no acute distress    Neck:  Supple, no carotid bruit, no cervical adenopathy  Abdomen:  Obese, soft, nontender, nondistended, no masses palpable, no  hepatosplenomegaly detected, bowel sounds are normal, no ascites clinically detectable  Eyes:  Conjunctivae anicteric, not injected  ENT:  Oral mucosa moist  Cardiovascular:  S1, S2 normal, no murmurs, no gallops, no abdominal bruits heard  Respiratory:  Bilateral air entry equal, no rhonchi, no crackles, normal effort  Skin:  No palmar erythema or spider angiomata  Neurologic:  No asterixis or tremors  Psychiatric:  Affect appropriate, proper judgment, proper insight, oriented to place and time  Lower extremities:  No pedal edema    Recent labs and prior endoscopy reports reviewed.      Impression: GERD with intermittent heartburn    Recommendations:   Continue use of Pepcid po prn

## 2022-11-29 ENCOUNTER — OFFICE VISIT (OUTPATIENT)
Dept: INTERNAL MEDICINE | Facility: CLINIC | Age: 57
End: 2022-11-29
Payer: COMMERCIAL

## 2022-11-29 VITALS
HEIGHT: 65 IN | OXYGEN SATURATION: 98 % | DIASTOLIC BLOOD PRESSURE: 86 MMHG | SYSTOLIC BLOOD PRESSURE: 126 MMHG | BODY MASS INDEX: 39.15 KG/M2 | HEART RATE: 67 BPM | WEIGHT: 235 LBS

## 2022-11-29 DIAGNOSIS — G89.29 CHRONIC LEFT-SIDED LOW BACK PAIN WITHOUT SCIATICA: ICD-10-CM

## 2022-11-29 DIAGNOSIS — M54.50 CHRONIC LEFT-SIDED LOW BACK PAIN WITHOUT SCIATICA: ICD-10-CM

## 2022-11-29 DIAGNOSIS — Z00.00 HEALTH CARE MAINTENANCE: Primary | ICD-10-CM

## 2022-11-29 DIAGNOSIS — I10 ESSENTIAL HYPERTENSION: ICD-10-CM

## 2022-11-29 PROCEDURE — 3074F SYST BP LT 130 MM HG: CPT | Mod: CPTII,S$GLB,, | Performed by: INTERNAL MEDICINE

## 2022-11-29 PROCEDURE — 3008F BODY MASS INDEX DOCD: CPT | Mod: CPTII,S$GLB,, | Performed by: INTERNAL MEDICINE

## 2022-11-29 PROCEDURE — 99396 PREV VISIT EST AGE 40-64: CPT | Mod: S$GLB,,, | Performed by: INTERNAL MEDICINE

## 2022-11-29 PROCEDURE — 1160F PR REVIEW ALL MEDS BY PRESCRIBER/CLIN PHARMACIST DOCUMENTED: ICD-10-PCS | Mod: CPTII,S$GLB,, | Performed by: INTERNAL MEDICINE

## 2022-11-29 PROCEDURE — 1159F PR MEDICATION LIST DOCUMENTED IN MEDICAL RECORD: ICD-10-PCS | Mod: CPTII,S$GLB,, | Performed by: INTERNAL MEDICINE

## 2022-11-29 PROCEDURE — 99999 PR PBB SHADOW E&M-EST. PATIENT-LVL IV: ICD-10-PCS | Mod: PBBFAC,,, | Performed by: INTERNAL MEDICINE

## 2022-11-29 PROCEDURE — 3079F PR MOST RECENT DIASTOLIC BLOOD PRESSURE 80-89 MM HG: ICD-10-PCS | Mod: CPTII,S$GLB,, | Performed by: INTERNAL MEDICINE

## 2022-11-29 PROCEDURE — 99396 PR PREVENTIVE VISIT,EST,40-64: ICD-10-PCS | Mod: S$GLB,,, | Performed by: INTERNAL MEDICINE

## 2022-11-29 PROCEDURE — 1160F RVW MEDS BY RX/DR IN RCRD: CPT | Mod: CPTII,S$GLB,, | Performed by: INTERNAL MEDICINE

## 2022-11-29 PROCEDURE — 1159F MED LIST DOCD IN RCRD: CPT | Mod: CPTII,S$GLB,, | Performed by: INTERNAL MEDICINE

## 2022-11-29 PROCEDURE — 99999 PR PBB SHADOW E&M-EST. PATIENT-LVL IV: CPT | Mod: PBBFAC,,, | Performed by: INTERNAL MEDICINE

## 2022-11-29 PROCEDURE — 3079F DIAST BP 80-89 MM HG: CPT | Mod: CPTII,S$GLB,, | Performed by: INTERNAL MEDICINE

## 2022-11-29 PROCEDURE — 3074F PR MOST RECENT SYSTOLIC BLOOD PRESSURE < 130 MM HG: ICD-10-PCS | Mod: CPTII,S$GLB,, | Performed by: INTERNAL MEDICINE

## 2022-11-29 PROCEDURE — 3008F PR BODY MASS INDEX (BMI) DOCUMENTED: ICD-10-PCS | Mod: CPTII,S$GLB,, | Performed by: INTERNAL MEDICINE

## 2022-11-29 RX ORDER — AMLODIPINE BESYLATE 2.5 MG/1
2.5 TABLET ORAL DAILY
Qty: 90 TABLET | Refills: 3 | Status: SHIPPED | OUTPATIENT
Start: 2022-11-29 | End: 2023-01-30

## 2022-11-29 NOTE — PROGRESS NOTES
"Subjective:       Patient ID: Carol Collins is a 57 y.o. female.    Chief Complaint: Annual Exam    HPI  Carol Collins is a 57 y.o. female here for a yearly preventative healthcare visit.     HTN  Amlodipine 5mg   ran out last week.    Estradiol 0.5mg  every other day- tapering off by end of year.   Dr. Uli Mallory     Reflux sx with certain food, avoids red wine, red sauce. Managing well with food avoidance.    Pain, intermittent, mostly at bed, tossing and turning.  Left lower back. Months.     Will probably be eating healthier now that son moved out. He liked to cook with salt and seay.     Review of Systems   Constitutional:  Negative for activity change and unexpected weight change.   HENT:  Negative for hearing loss, rhinorrhea and trouble swallowing.    Eyes:  Negative for discharge and visual disturbance.   Respiratory:  Negative for chest tightness and wheezing.    Cardiovascular:  Negative for chest pain and palpitations.   Gastrointestinal:  Negative for blood in stool, constipation, diarrhea and vomiting.   Endocrine: Negative for polydipsia and polyuria.   Genitourinary:  Negative for difficulty urinating, dysuria, hematuria and menstrual problem.   Musculoskeletal:  Negative for arthralgias, joint swelling and neck pain.   Neurological:  Negative for weakness and headaches.   Psychiatric/Behavioral:  Negative for confusion and dysphoric mood.      Objective:   /86 (BP Location: Right arm, Patient Position: Sitting, BP Method: Large (Manual))   Pulse 67   Ht 5' 5" (1.651 m)   Wt 106.6 kg (235 lb)   SpO2 98%   BMI 39.11 kg/m²      Physical Exam  Vitals reviewed.   Constitutional:       Appearance: She is well-developed.   HENT:      Head: Normocephalic and atraumatic.   Eyes:      Conjunctiva/sclera: Conjunctivae normal.      Pupils: Pupils are equal, round, and reactive to light.   Neck:      Thyroid: No thyromegaly.   Cardiovascular:      Rate and Rhythm: Normal rate and regular rhythm. "      Heart sounds: Normal heart sounds. No murmur heard.  Pulmonary:      Effort: Pulmonary effort is normal. No respiratory distress.      Breath sounds: Normal breath sounds. No wheezing.   Abdominal:      General: There is no distension.      Palpations: Abdomen is soft.      Tenderness: There is no abdominal tenderness. There is no rebound.   Musculoskeletal:         General: No swelling or deformity. Normal range of motion.      Cervical back: Neck supple.   Lymphadenopathy:      Cervical: No cervical adenopathy.   Skin:     General: Skin is warm and dry.      Findings: No rash.   Neurological:      Mental Status: She is alert and oriented to person, place, and time.   Psychiatric:         Thought Content: Thought content normal.         Judgment: Judgment normal.       Assessment:       1. Health care maintenance    2. Essential hypertension    3. Chronic left-sided low back pain without sciatica        Plan:       Carol was seen today for annual exam.    Diagnoses and all orders for this visit:    Health care maintenance  -     Hemoglobin A1C; Future  -     Lipid Panel; Future  -     Comprehensive Metabolic Panel; Future    Essential hypertension  -   DECREASE  amLODIPine (NORVASC) 2.5 MG tablet; Take 1 tablet (2.5 mg total) by mouth once daily.  Follow upfor bp check 1 mo, may be able to stop all together    Chronic left-sided low back pain without sciatica  -     Ambulatory referral/consult to [facility]; Future   Ergonomics, importance of good mattress reviewed      Decrease to 2.5mg amlodipine; can stop if remains at goal.    Fasting labs  Covid booster will do at [facility]  Flu vaccine done  1 month w Lore - BP check

## 2022-12-05 ENCOUNTER — LAB VISIT (OUTPATIENT)
Dept: PRIMARY CARE CLINIC | Facility: CLINIC | Age: 57
End: 2022-12-05
Payer: COMMERCIAL

## 2022-12-05 DIAGNOSIS — Z00.00 HEALTH CARE MAINTENANCE: ICD-10-CM

## 2022-12-05 LAB
ALBUMIN SERPL BCP-MCNC: 3.6 G/DL (ref 3.5–5.2)
ALP SERPL-CCNC: 118 U/L (ref 55–135)
ALT SERPL W/O P-5'-P-CCNC: 19 U/L (ref 10–44)
ANION GAP SERPL CALC-SCNC: 8 MMOL/L (ref 8–16)
AST SERPL-CCNC: 20 U/L (ref 10–40)
BILIRUB SERPL-MCNC: 0.3 MG/DL (ref 0.1–1)
BUN SERPL-MCNC: 9 MG/DL (ref 6–20)
CALCIUM SERPL-MCNC: 9.4 MG/DL (ref 8.7–10.5)
CHLORIDE SERPL-SCNC: 106 MMOL/L (ref 95–110)
CHOLEST SERPL-MCNC: 205 MG/DL (ref 120–199)
CHOLEST/HDLC SERPL: 4.5 {RATIO} (ref 2–5)
CO2 SERPL-SCNC: 28 MMOL/L (ref 23–29)
CREAT SERPL-MCNC: 0.7 MG/DL (ref 0.5–1.4)
EST. GFR  (NO RACE VARIABLE): >60 ML/MIN/1.73 M^2
ESTIMATED AVG GLUCOSE: 117 MG/DL (ref 68–131)
GLUCOSE SERPL-MCNC: 97 MG/DL (ref 70–110)
HBA1C MFR BLD: 5.7 % (ref 4–5.6)
HDLC SERPL-MCNC: 46 MG/DL (ref 40–75)
HDLC SERPL: 22.4 % (ref 20–50)
LDLC SERPL CALC-MCNC: 146.4 MG/DL (ref 63–159)
NONHDLC SERPL-MCNC: 159 MG/DL
POTASSIUM SERPL-SCNC: 4.3 MMOL/L (ref 3.5–5.1)
PROT SERPL-MCNC: 7.4 G/DL (ref 6–8.4)
SODIUM SERPL-SCNC: 142 MMOL/L (ref 136–145)
TRIGL SERPL-MCNC: 63 MG/DL (ref 30–150)

## 2022-12-05 PROCEDURE — 80053 COMPREHEN METABOLIC PANEL: CPT | Performed by: INTERNAL MEDICINE

## 2022-12-05 PROCEDURE — 80061 LIPID PANEL: CPT | Performed by: INTERNAL MEDICINE

## 2022-12-05 PROCEDURE — 83036 HEMOGLOBIN GLYCOSYLATED A1C: CPT | Performed by: INTERNAL MEDICINE

## 2022-12-09 ENCOUNTER — OFFICE VISIT (OUTPATIENT)
Dept: OPTOMETRY | Facility: CLINIC | Age: 57
End: 2022-12-09
Payer: COMMERCIAL

## 2022-12-09 DIAGNOSIS — H52.13 MYOPIA OF BOTH EYES: ICD-10-CM

## 2022-12-09 DIAGNOSIS — Z46.0 FITTING AND ADJUSTMENT OF SPECTACLES AND CONTACT LENSES: Primary | ICD-10-CM

## 2022-12-09 DIAGNOSIS — E11.9 DIABETES MELLITUS WITHOUT COMPLICATION: Primary | ICD-10-CM

## 2022-12-09 DIAGNOSIS — Z97.3 WEARS CONTACT LENSES: ICD-10-CM

## 2022-12-09 PROCEDURE — 99999 PR PBB SHADOW E&M-EST. PATIENT-LVL II: ICD-10-PCS | Mod: PBBFAC,,, | Performed by: OPTOMETRIST

## 2022-12-09 PROCEDURE — 3044F PR MOST RECENT HEMOGLOBIN A1C LEVEL <7.0%: ICD-10-PCS | Mod: CPTII,S$GLB,, | Performed by: OPTOMETRIST

## 2022-12-09 PROCEDURE — 3044F HG A1C LEVEL LT 7.0%: CPT | Mod: CPTII,S$GLB,, | Performed by: OPTOMETRIST

## 2022-12-09 PROCEDURE — 1159F PR MEDICATION LIST DOCUMENTED IN MEDICAL RECORD: ICD-10-PCS | Mod: CPTII,S$GLB,, | Performed by: OPTOMETRIST

## 2022-12-09 PROCEDURE — 99499 NO LOS: ICD-10-PCS | Mod: S$GLB,,, | Performed by: OPTOMETRIST

## 2022-12-09 PROCEDURE — 99499 UNLISTED E&M SERVICE: CPT | Mod: S$GLB,,, | Performed by: OPTOMETRIST

## 2022-12-09 PROCEDURE — 99999 PR PBB SHADOW E&M-EST. PATIENT-LVL II: CPT | Mod: PBBFAC,,, | Performed by: OPTOMETRIST

## 2022-12-09 PROCEDURE — 92310 CONTACT LENS FITTING OU: CPT | Mod: CSM,,, | Performed by: OPTOMETRIST

## 2022-12-09 PROCEDURE — 92004 COMPRE OPH EXAM NEW PT 1/>: CPT | Mod: S$GLB,,, | Performed by: OPTOMETRIST

## 2022-12-09 PROCEDURE — 92015 PR REFRACTION: ICD-10-PCS | Mod: S$GLB,,, | Performed by: OPTOMETRIST

## 2022-12-09 PROCEDURE — 92004 PR EYE EXAM, NEW PATIENT,COMPREHESV: ICD-10-PCS | Mod: S$GLB,,, | Performed by: OPTOMETRIST

## 2022-12-09 PROCEDURE — 92015 DETERMINE REFRACTIVE STATE: CPT | Mod: S$GLB,,, | Performed by: OPTOMETRIST

## 2022-12-09 PROCEDURE — 92310 PR CONTACT LENS FITTING (NO CHANGE): ICD-10-PCS | Mod: CSM,,, | Performed by: OPTOMETRIST

## 2022-12-09 PROCEDURE — 1159F MED LIST DOCD IN RCRD: CPT | Mod: CPTII,S$GLB,, | Performed by: OPTOMETRIST

## 2022-12-09 NOTE — PROGRESS NOTES
HPI    CC: Distance vision could be sharper. Complains of dry eye symptoms with   her contact lens. Daily wear lens/cleans with [place]/monovision-however   recently has to wear readers over the lens  ALYX: 1 year ago  (+) Changes in vision   (-) Pain  (+) Irritation   (-) Itching   (-) Flashes  (-) Floaters  (+) Glasses wearer  (+) CL wearer  (-) Uses eye gtts  Does patient want a refraction today? Yes  (-) Eye injury  (-) Eye surgery   (-)POHx  (-)FOHx  (+)DM  Hemoglobin A1C       Date                     Value               Ref Range             Status                12/05/2022               5.7 (H)             4.0 - 5.6 %           Final            Last edited by Rebeka Kelly, OD on 12/9/2022  2:53 PM.            Assessment /Plan     For exam results, see Encounter Report.    Diabetes mellitus without complication    Myopia of both eyes    Wears contact lenses      1. No retinopathy noted today.  Continued control with primary care physician and annual comprehensive eye exam.   2.   Eyeglass Final Rx       Eyeglass Final Rx         Sphere Cylinder Dist VA Add    Right -5.50 Sphere 20/20 +2.50    Left -4.75 Sphere 20/25 +2.50      Type: PAL    Expiration Date: 12/9/2023                 3.   Contact Lens Current Rx       Current Contact Lens Rx         Brand Base Curve Diameter Sphere Cylinder    Right Acuvue 1-Day Moist  14.2 -3.25     Left Acuvue 1-Day Moist  14.2 -4.25               Current Contact Lens Rx #2 (Trial Lens, Dispensed)         Brand Base Curve Diameter Sphere Cylinder    Right Precision1 8.3 14.2 -3.00 Sphere    Left Precision1 8.3 14.2 -4.50 Sphere              Current Contact Lens Rx Comments    OS dominant               Updated CL Rx. Initially good comfort and vision. Given CL trials to take home. If happy with comfort and vision of trial lenses, may order annual supply. If issues arise, RTC for CL f/u. Reviewed proper CL care and hygiene. Monitor yearly unless changes noted sooner.       RTC in 1 year for annual eye exam unless changes noted sooner.      Added 10/25/23 finalized contact rx for precision1 pt happy with lenses Ray

## 2022-12-12 ENCOUNTER — PATIENT MESSAGE (OUTPATIENT)
Dept: OPTOMETRY | Facility: CLINIC | Age: 57
End: 2022-12-12
Payer: COMMERCIAL

## 2023-01-30 ENCOUNTER — OFFICE VISIT (OUTPATIENT)
Dept: INTERNAL MEDICINE | Facility: CLINIC | Age: 58
End: 2023-01-30
Payer: COMMERCIAL

## 2023-01-30 VITALS
HEART RATE: 94 BPM | SYSTOLIC BLOOD PRESSURE: 124 MMHG | HEIGHT: 65 IN | OXYGEN SATURATION: 99 % | WEIGHT: 235.81 LBS | BODY MASS INDEX: 39.29 KG/M2 | DIASTOLIC BLOOD PRESSURE: 72 MMHG

## 2023-01-30 DIAGNOSIS — Z79.890 HORMONE REPLACEMENT THERAPY (HRT): ICD-10-CM

## 2023-01-30 DIAGNOSIS — I10 HYPERTENSION, ESSENTIAL: Primary | ICD-10-CM

## 2023-01-30 PROCEDURE — 3078F DIAST BP <80 MM HG: CPT | Mod: CPTII,S$GLB,, | Performed by: PHYSICIAN ASSISTANT

## 2023-01-30 PROCEDURE — 3078F PR MOST RECENT DIASTOLIC BLOOD PRESSURE < 80 MM HG: ICD-10-PCS | Mod: CPTII,S$GLB,, | Performed by: PHYSICIAN ASSISTANT

## 2023-01-30 PROCEDURE — 99213 OFFICE O/P EST LOW 20 MIN: CPT | Mod: S$GLB,,, | Performed by: PHYSICIAN ASSISTANT

## 2023-01-30 PROCEDURE — 3008F BODY MASS INDEX DOCD: CPT | Mod: CPTII,S$GLB,, | Performed by: PHYSICIAN ASSISTANT

## 2023-01-30 PROCEDURE — 1159F MED LIST DOCD IN RCRD: CPT | Mod: CPTII,S$GLB,, | Performed by: PHYSICIAN ASSISTANT

## 2023-01-30 PROCEDURE — 99999 PR PBB SHADOW E&M-EST. PATIENT-LVL III: CPT | Mod: PBBFAC,,, | Performed by: PHYSICIAN ASSISTANT

## 2023-01-30 PROCEDURE — 3074F SYST BP LT 130 MM HG: CPT | Mod: CPTII,S$GLB,, | Performed by: PHYSICIAN ASSISTANT

## 2023-01-30 PROCEDURE — 1159F PR MEDICATION LIST DOCUMENTED IN MEDICAL RECORD: ICD-10-PCS | Mod: CPTII,S$GLB,, | Performed by: PHYSICIAN ASSISTANT

## 2023-01-30 PROCEDURE — 3074F PR MOST RECENT SYSTOLIC BLOOD PRESSURE < 130 MM HG: ICD-10-PCS | Mod: CPTII,S$GLB,, | Performed by: PHYSICIAN ASSISTANT

## 2023-01-30 PROCEDURE — 99213 PR OFFICE/OUTPT VISIT, EST, LEVL III, 20-29 MIN: ICD-10-PCS | Mod: S$GLB,,, | Performed by: PHYSICIAN ASSISTANT

## 2023-01-30 PROCEDURE — 99999 PR PBB SHADOW E&M-EST. PATIENT-LVL III: ICD-10-PCS | Mod: PBBFAC,,, | Performed by: PHYSICIAN ASSISTANT

## 2023-01-30 PROCEDURE — 1160F RVW MEDS BY RX/DR IN RCRD: CPT | Mod: CPTII,S$GLB,, | Performed by: PHYSICIAN ASSISTANT

## 2023-01-30 PROCEDURE — 1160F PR REVIEW ALL MEDS BY PRESCRIBER/CLIN PHARMACIST DOCUMENTED: ICD-10-PCS | Mod: CPTII,S$GLB,, | Performed by: PHYSICIAN ASSISTANT

## 2023-01-30 PROCEDURE — 3008F PR BODY MASS INDEX (BMI) DOCUMENTED: ICD-10-PCS | Mod: CPTII,S$GLB,, | Performed by: PHYSICIAN ASSISTANT

## 2023-01-30 NOTE — PROGRESS NOTES
Subjective:       Patient ID: Carol Collins is a 57 y.o. female.        Chief Complaint: Follow-up    Carol Collins is an established patient of Chiquis Messina MD here today for follow up visit.    HTN - BP has been normal and amlodipine reduced from 5 mg to 2.5 mg, BP remained in 120's, so will trial d/c completely    HRT - off hormones with minimal sx, a few hot flashes but manageable     BMI 39 -   Wt Readings from Last 4 Encounters:  01/30/23 : 106.9 kg (235 lb 12.5 oz)  11/29/22 : 106.6 kg (235 lb)  10/26/22 : 108.4 kg (238 lb 15.7 oz)  09/09/22 : 107.2 kg (236 lb 5.3 oz)         Review of Systems   Constitutional:  Negative for chills, diaphoresis, fatigue and fever.   HENT:  Negative for congestion and sore throat.    Eyes:  Negative for visual disturbance.   Respiratory:  Negative for cough, chest tightness and shortness of breath.    Cardiovascular:  Negative for chest pain, palpitations and leg swelling.   Gastrointestinal:  Negative for abdominal pain, blood in stool, constipation, diarrhea, nausea and vomiting.   Genitourinary:  Negative for dysuria, frequency, hematuria and urgency.   Musculoskeletal:  Negative for arthralgias and back pain.   Skin:  Negative for rash.   Neurological:  Negative for dizziness, syncope, weakness and headaches.   Psychiatric/Behavioral:  Negative for dysphoric mood and sleep disturbance. The patient is not nervous/anxious.      Objective:      Physical Exam  Vitals and nursing note reviewed.   Constitutional:       Appearance: Normal appearance. She is well-developed.   HENT:      Head: Normocephalic.      Right Ear: External ear normal.      Left Ear: External ear normal.   Eyes:      Pupils: Pupils are equal, round, and reactive to light.   Cardiovascular:      Rate and Rhythm: Normal rate and regular rhythm.      Heart sounds: Normal heart sounds. No murmur heard.    No friction rub. No gallop.   Pulmonary:      Effort: Pulmonary effort is normal. No  "respiratory distress.      Breath sounds: Normal breath sounds.   Abdominal:      Palpations: Abdomen is soft.      Tenderness: There is no abdominal tenderness.   Skin:     General: Skin is warm and dry.   Neurological:      Mental Status: She is alert.       Assessment:       1. Hypertension, essential    2. Hormone replacement therapy (HRT)    3. BMI 39.0-39.9,adult          Plan:       Carol was seen today for follow-up.    Diagnoses and all orders for this visit:    Hypertension, essential - will d/c amlodipine completely, monitor home BP, return in 6-8 weeks to ensure all is fine, bring home BP log and cuff    Hormone replacement therapy (HRT) - off hormones, doing fine, monitor    BMI 39.0-39.9,adult - weight loss, exercise, healthy diet  Wt Readings from Last 4 Encounters:   01/30/23 106.9 kg (235 lb 12.5 oz)   11/29/22 106.6 kg (235 lb)   10/26/22 108.4 kg (238 lb 15.7 oz)   09/09/22 107.2 kg (236 lb 5.3 oz)     Previously with lots of stress that is now improved with switching jobs  Prior back pain also resolved    Pt has been given instructions populated from patient instructions database and has verbalized understanding of the after visit summary and information contained wherein.    Follow up with a primary care provider. May go to ER for acute shortness of breath, lightheadedness, fever, or any other emergent complaints or changes in condition.    "This note will be shared with the patient"    Future Appointments   Date Time Provider Department Center   3/27/2023  2:00 PM Lore Marcelo PA-C [facility] [place]                 "

## 2023-01-30 NOTE — PATIENT INSTRUCTIONS
Stop amlodipine  Monitor home blood pressure  Resume amlodipine if BP is >135/85 on a regular basis  Return in 6-8 weeks to check BP one more time off meds

## 2023-04-03 ENCOUNTER — OFFICE VISIT (OUTPATIENT)
Dept: INTERNAL MEDICINE | Facility: CLINIC | Age: 58
End: 2023-04-03
Payer: COMMERCIAL

## 2023-04-03 VITALS
HEIGHT: 65 IN | DIASTOLIC BLOOD PRESSURE: 78 MMHG | HEART RATE: 92 BPM | OXYGEN SATURATION: 98 % | SYSTOLIC BLOOD PRESSURE: 120 MMHG | BODY MASS INDEX: 39.71 KG/M2 | WEIGHT: 238.31 LBS

## 2023-04-03 DIAGNOSIS — Z12.31 SCREENING MAMMOGRAM, ENCOUNTER FOR: Primary | ICD-10-CM

## 2023-04-03 DIAGNOSIS — Z79.890 HORMONE REPLACEMENT THERAPY (HRT): ICD-10-CM

## 2023-04-03 PROBLEM — I10 ESSENTIAL HYPERTENSION: Status: RESOLVED | Noted: 2019-08-09 | Resolved: 2023-04-03

## 2023-04-03 PROCEDURE — 99999 PR PBB SHADOW E&M-EST. PATIENT-LVL III: ICD-10-PCS | Mod: PBBFAC,,, | Performed by: PHYSICIAN ASSISTANT

## 2023-04-03 PROCEDURE — 3078F DIAST BP <80 MM HG: CPT | Mod: CPTII,S$GLB,, | Performed by: PHYSICIAN ASSISTANT

## 2023-04-03 PROCEDURE — 3008F BODY MASS INDEX DOCD: CPT | Mod: CPTII,S$GLB,, | Performed by: PHYSICIAN ASSISTANT

## 2023-04-03 PROCEDURE — 1159F MED LIST DOCD IN RCRD: CPT | Mod: CPTII,S$GLB,, | Performed by: PHYSICIAN ASSISTANT

## 2023-04-03 PROCEDURE — 99999 PR PBB SHADOW E&M-EST. PATIENT-LVL III: CPT | Mod: PBBFAC,,, | Performed by: PHYSICIAN ASSISTANT

## 2023-04-03 PROCEDURE — 3078F PR MOST RECENT DIASTOLIC BLOOD PRESSURE < 80 MM HG: ICD-10-PCS | Mod: CPTII,S$GLB,, | Performed by: PHYSICIAN ASSISTANT

## 2023-04-03 PROCEDURE — 99213 OFFICE O/P EST LOW 20 MIN: CPT | Mod: S$GLB,,, | Performed by: PHYSICIAN ASSISTANT

## 2023-04-03 PROCEDURE — 1160F PR REVIEW ALL MEDS BY PRESCRIBER/CLIN PHARMACIST DOCUMENTED: ICD-10-PCS | Mod: CPTII,S$GLB,, | Performed by: PHYSICIAN ASSISTANT

## 2023-04-03 PROCEDURE — 99213 PR OFFICE/OUTPT VISIT, EST, LEVL III, 20-29 MIN: ICD-10-PCS | Mod: S$GLB,,, | Performed by: PHYSICIAN ASSISTANT

## 2023-04-03 PROCEDURE — 3008F PR BODY MASS INDEX (BMI) DOCUMENTED: ICD-10-PCS | Mod: CPTII,S$GLB,, | Performed by: PHYSICIAN ASSISTANT

## 2023-04-03 PROCEDURE — 1159F PR MEDICATION LIST DOCUMENTED IN MEDICAL RECORD: ICD-10-PCS | Mod: CPTII,S$GLB,, | Performed by: PHYSICIAN ASSISTANT

## 2023-04-03 PROCEDURE — 3074F SYST BP LT 130 MM HG: CPT | Mod: CPTII,S$GLB,, | Performed by: PHYSICIAN ASSISTANT

## 2023-04-03 PROCEDURE — 1160F RVW MEDS BY RX/DR IN RCRD: CPT | Mod: CPTII,S$GLB,, | Performed by: PHYSICIAN ASSISTANT

## 2023-04-03 PROCEDURE — 3074F PR MOST RECENT SYSTOLIC BLOOD PRESSURE < 130 MM HG: ICD-10-PCS | Mod: CPTII,S$GLB,, | Performed by: PHYSICIAN ASSISTANT

## 2023-04-03 NOTE — PROGRESS NOTES
Subjective:       Patient ID: Carol Collins is a 58 y.o. female.        Chief Complaint: Follow-up    Carol Collins is an established patient of Chiquis Messina MD here today for follow up visit.    H/o HTN - now off amlodipine totally x 2 months and BP has remained stable    Off HRT as well and no sx    BMI 39, realizes she needs to walk more and lose weight         Review of Systems   Constitutional:  Negative for chills, diaphoresis, fatigue and fever.   HENT:  Negative for congestion and sore throat.    Eyes:  Negative for visual disturbance.   Respiratory:  Negative for cough, chest tightness and shortness of breath.    Cardiovascular:  Negative for chest pain, palpitations and leg swelling.   Gastrointestinal:  Negative for abdominal pain, blood in stool, constipation, diarrhea, nausea and vomiting.   Genitourinary:  Negative for dysuria, frequency, hematuria and urgency.   Musculoskeletal:  Negative for arthralgias and back pain.   Skin:  Negative for rash.   Neurological:  Negative for dizziness, syncope, weakness and headaches.   Psychiatric/Behavioral:  Negative for dysphoric mood and sleep disturbance. The patient is not nervous/anxious.      Objective:      Physical Exam  Vitals and nursing note reviewed.   Constitutional:       Appearance: Normal appearance. She is well-developed.   HENT:      Head: Normocephalic.      Right Ear: External ear normal.      Left Ear: External ear normal.   Eyes:      Pupils: Pupils are equal, round, and reactive to light.   Cardiovascular:      Rate and Rhythm: Normal rate and regular rhythm.      Heart sounds: Normal heart sounds. No murmur heard.    No friction rub. No gallop.   Pulmonary:      Effort: Pulmonary effort is normal. No respiratory distress.      Breath sounds: Normal breath sounds.   Abdominal:      Palpations: Abdomen is soft.      Tenderness: There is no abdominal tenderness.   Skin:     General: Skin is warm and dry.   Neurological:      Mental  "Status: She is alert.       Assessment:       1. Screening mammogram, encounter for    2. Hormone replacement therapy (HRT)        Plan:       Carol was seen today for follow-up.    Diagnoses and all orders for this visit:    Screening mammogram, encounter for  -     Mammo Digital Screening Bilat w/ [facility]; Future    Hormone replacement therapy (HRT) - doing fine off hormones    HTN resolved   BP Readings from Last 5 Encounters:   04/03/23 120/78   01/30/23 124/72   11/29/22 126/86   10/26/22 108/72   09/09/22 132/61      F/u for annual due 11/2023  Monitor BP at home    Pt has been given instructions populated from patient instructions database and has verbalized understanding of the after visit summary and information contained wherein.    Follow up with a primary care provider. May go to ER for acute shortness of breath, lightheadedness, fever, or any other emergent complaints or changes in condition.    "This note will be shared with the patient"    No future appointments.              "

## 2023-05-07 ENCOUNTER — PATIENT MESSAGE (OUTPATIENT)
Dept: INTERNAL MEDICINE | Facility: CLINIC | Age: 58
End: 2023-05-07
Payer: COMMERCIAL

## 2023-05-08 ENCOUNTER — PATIENT MESSAGE (OUTPATIENT)
Dept: INTERNAL MEDICINE | Facility: CLINIC | Age: 58
End: 2023-05-08
Payer: COMMERCIAL

## 2023-07-13 ENCOUNTER — PATIENT MESSAGE (OUTPATIENT)
Dept: INTERNAL MEDICINE | Facility: CLINIC | Age: 58
End: 2023-07-13
Payer: COMMERCIAL

## 2023-08-23 ENCOUNTER — OFFICE VISIT (OUTPATIENT)
Dept: INTERNAL MEDICINE | Facility: CLINIC | Age: 58
End: 2023-08-23
Payer: COMMERCIAL

## 2023-08-23 DIAGNOSIS — Z86.79 HISTORY OF HYPERTENSION: ICD-10-CM

## 2023-08-23 DIAGNOSIS — Z00.00 HEALTH CARE MAINTENANCE: Primary | ICD-10-CM

## 2023-08-23 PROBLEM — K21.9 GERD (GASTROESOPHAGEAL REFLUX DISEASE): Status: RESOLVED | Noted: 2017-10-23 | Resolved: 2023-08-23

## 2023-08-23 PROCEDURE — 99213 PR OFFICE/OUTPT VISIT, EST, LEVL III, 20-29 MIN: ICD-10-PCS | Mod: 95,,, | Performed by: PHYSICIAN ASSISTANT

## 2023-08-23 PROCEDURE — 99213 OFFICE O/P EST LOW 20 MIN: CPT | Mod: 95,,, | Performed by: PHYSICIAN ASSISTANT

## 2023-08-23 PROCEDURE — 1160F PR REVIEW ALL MEDS BY PRESCRIBER/CLIN PHARMACIST DOCUMENTED: ICD-10-PCS | Mod: CPTII,95,, | Performed by: PHYSICIAN ASSISTANT

## 2023-08-23 PROCEDURE — 1159F PR MEDICATION LIST DOCUMENTED IN MEDICAL RECORD: ICD-10-PCS | Mod: CPTII,95,, | Performed by: PHYSICIAN ASSISTANT

## 2023-08-23 PROCEDURE — 1159F MED LIST DOCD IN RCRD: CPT | Mod: CPTII,95,, | Performed by: PHYSICIAN ASSISTANT

## 2023-08-23 PROCEDURE — 1160F RVW MEDS BY RX/DR IN RCRD: CPT | Mod: CPTII,95,, | Performed by: PHYSICIAN ASSISTANT

## 2023-08-23 NOTE — PROGRESS NOTES
Subjective:       Patient ID: Carol Collins is a 58 y.o. female.        Chief Complaint: Follow-up    Carol Collins is an established patient of Chiquis Messina MD here today for urgent care visit.    In the past few weeks a couple episodes of dizziness.  Working in yard, got hot, not drinking water, dizzy.  Transient.  Drank water and cooled off and felt fine.  Sometimes if she goes too long without eating she may get a bit weak feeling, eats, feels better.    Primarily here today to discuss her A1C of 5.7.  She has been receiving communication from her insurance about diabetes and she questions if she is dx with diabetes.  Discussed that based on blood sugar levels and A1C from 12/2022, no diabetes dx.  She has not been exercising and weight has gradually gone up.  She did not weigh herself today.      The patient location is: her home  The chief complaint leading to consultation is: follow up    Visit type: audiovisual    Face to Face time with patient: 13 minutes  20 minutes of total time spent on the encounter, which includes face to face time and non-face to face time preparing to see the patient (eg, review of tests), Obtaining and/or reviewing separately obtained history, Documenting clinical information in the electronic or other health record, Independently interpreting results (not separately reported) and communicating results to the patient/family/caregiver, or Care coordination (not separately reported).         Each patient to whom he or she provides medical services by telemedicine is:  (1) informed of the relationship between the physician and patient and the respective role of any other health care provider with respect to management of the patient; and (2) notified that he or she may decline to receive medical services by telemedicine and may withdraw from such care at any time.            Diabetes  Hypoglycemia symptoms include dizziness, headaches, hunger and sleepiness. Pertinent  negatives for hypoglycemia include no confusion, mood changes, nervousness/anxiousness, pallor, seizures, speech difficulty, sweats or tremors. Associated symptoms include weakness. Pertinent negatives for diabetes include no chest pain and no fatigue. Pertinent negatives for hypoglycemia complications include no blackouts, no hospitalization, no nocturnal hypoglycemia, no required assistance and no required glucagon injection. Symptoms are worsening. Pertinent negatives for diabetic complications include no autonomic neuropathy, CVA, heart disease, nephropathy, peripheral neuropathy, PVD or retinopathy. Risk factors for coronary artery disease include family history, obesity and post-menopausal. When asked about current treatments, none were reported. Her weight is increasing steadily. She is following a generally healthy diet. When asked about meal planning, she reported none. She has not had a previous visit with a dietitian. She participates in exercise three times a week. She does not see a podiatrist.Eye exam is current.   Follow-up  Associated symptoms include headaches and weakness. Pertinent negatives include no abdominal pain, arthralgias, chest pain, chills, congestion, coughing, diaphoresis, fatigue, fever, nausea, rash, sore throat or vomiting.      Review of Systems   Constitutional:  Negative for chills, diaphoresis, fatigue and fever.   HENT:  Negative for congestion and sore throat.    Eyes:  Negative for visual disturbance.   Respiratory:  Negative for cough, chest tightness and shortness of breath.    Cardiovascular:  Negative for chest pain, palpitations and leg swelling.   Gastrointestinal:  Negative for abdominal pain, blood in stool, constipation, diarrhea, nausea and vomiting.   Genitourinary:  Negative for dysuria, frequency, hematuria and urgency.   Musculoskeletal:  Negative for arthralgias and back pain.   Skin:  Negative for pallor and rash.   Neurological:  Positive for dizziness,  "weakness and headaches. Negative for tremors, seizures, syncope and speech difficulty.   Psychiatric/Behavioral:  Negative for confusion, dysphoric mood and sleep disturbance. The patient is not nervous/anxious.        Objective:      Physical Exam  HENT:      Head: Normocephalic.   Pulmonary:      Effort: Pulmonary effort is normal.   Neurological:      General: No focal deficit present.      Mental Status: She is alert.   Psychiatric:         Mood and Affect: Mood normal.         Assessment:       1. Health care maintenance    2. BMI 39.0-39.9,adult    3. History of hypertension - off amlodipine since 1/2023        Plan:       Caorl was seen today for follow-up.    Diagnoses and all orders for this visit:    Health care maintenance  -     Ambulatory referral/consult to Gynecology; Future    BMI 39.0-39.9,adult    History of hypertension - off amlodipine since 1/2023    She wants to see GYN at [facility] - referral placed  Discussed her A1C of 5.7 and how to improve - weight loss, exercise, low carb diet  Schedule annual around 12/2023 with Dr. Messina with labs prior    Pt has been given instructions populated from patient instructions database and has verbalized understanding of the after visit summary and information contained wherein.    Follow up with a primary care provider. May go to ER for acute shortness of breath, lightheadedness, fever, or any other emergent complaints or changes in condition.    "This note will be shared with the patient"    Future Appointments   Date Time Provider Department Center   9/5/2023  1:00 PM JUANITA Brice                 "

## 2023-09-05 ENCOUNTER — HOSPITAL ENCOUNTER (OUTPATIENT)
Dept: RADIOLOGY | Facility: HOSPITAL | Age: 58
Discharge: HOME | End: 2023-09-05
Attending: PHYSICIAN ASSISTANT
Payer: COMMERCIAL

## 2023-09-05 DIAGNOSIS — Z12.31 SCREENING MAMMOGRAM, ENCOUNTER FOR: ICD-10-CM

## 2023-09-05 PROCEDURE — 77067 SCR MAMMO BI INCL CAD: CPT | Mod: TC

## 2023-09-05 PROCEDURE — 77063 MAMMO DIGITAL SCREENING BILAT WITH TOMO: ICD-10-PCS | Mod: 26,,, | Performed by: RADIOLOGY

## 2023-09-05 PROCEDURE — 77063 BREAST TOMOSYNTHESIS BI: CPT | Mod: 26,,, | Performed by: RADIOLOGY

## 2023-09-05 PROCEDURE — 77067 MAMMO DIGITAL SCREENING BILAT WITH TOMO: ICD-10-PCS | Mod: 26,,, | Performed by: RADIOLOGY

## 2023-09-05 PROCEDURE — 77067 SCR MAMMO BI INCL CAD: CPT | Mod: 26,,, | Performed by: RADIOLOGY

## 2023-09-15 ENCOUNTER — PATIENT MESSAGE (OUTPATIENT)
Dept: INTERNAL MEDICINE | Facility: CLINIC | Age: 58
End: 2023-09-15
Payer: COMMERCIAL

## 2023-09-20 ENCOUNTER — OFFICE VISIT (OUTPATIENT)
Dept: INTERNAL MEDICINE | Facility: CLINIC | Age: 58
End: 2023-09-20
Payer: COMMERCIAL

## 2023-09-20 ENCOUNTER — IMMUNIZATION (OUTPATIENT)
Dept: INTERNAL MEDICINE | Facility: CLINIC | Age: 58
End: 2023-09-20
Payer: COMMERCIAL

## 2023-09-20 VITALS
DIASTOLIC BLOOD PRESSURE: 80 MMHG | HEIGHT: 65 IN | HEART RATE: 68 BPM | SYSTOLIC BLOOD PRESSURE: 132 MMHG | WEIGHT: 239 LBS | OXYGEN SATURATION: 99 % | BODY MASS INDEX: 39.82 KG/M2

## 2023-09-20 DIAGNOSIS — Z00.00 HEALTH CARE MAINTENANCE: Primary | ICD-10-CM
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
## 2023-09-20 DIAGNOSIS — Z86.79 HISTORY OF HYPERTENSION: ICD-10-CM

## 2023-09-20 PROCEDURE — 99999 PR PBB SHADOW E&M-EST. PATIENT-LVL IV: CPT | Mod: PBBFAC,,, | Performed by: PHYSICIAN ASSISTANT

## 2023-09-20 PROCEDURE — 1159F PR MEDICATION LIST DOCUMENTED IN MEDICAL RECORD: ICD-10-PCS | Mod: CPTII,S$GLB,, | Performed by: PHYSICIAN ASSISTANT

## 2023-09-20 PROCEDURE — 3075F SYST BP GE 130 - 139MM HG: CPT | Mod: CPTII,S$GLB,, | Performed by: PHYSICIAN ASSISTANT

## 2023-09-20 PROCEDURE — 99396 PREV VISIT EST AGE 40-64: CPT | Mod: S$GLB,,, | Performed by: PHYSICIAN ASSISTANT

## 2023-09-20 PROCEDURE — 3079F DIAST BP 80-89 MM HG: CPT | Mod: CPTII,S$GLB,, | Performed by: PHYSICIAN ASSISTANT

## 2023-09-20 PROCEDURE — 3008F PR BODY MASS INDEX (BMI) DOCUMENTED: ICD-10-PCS | Mod: CPTII,S$GLB,, | Performed by: PHYSICIAN ASSISTANT

## 2023-09-20 PROCEDURE — 90686 IIV4 VACC NO PRSV 0.5 ML IM: CPT | Mod: S$GLB,,, | Performed by: INTERNAL MEDICINE

## 2023-09-20 PROCEDURE — 99396 PR PREVENTIVE VISIT,EST,40-64: ICD-10-PCS | Mod: S$GLB,,, | Performed by: PHYSICIAN ASSISTANT

## 2023-09-20 PROCEDURE — 1159F MED LIST DOCD IN RCRD: CPT | Mod: CPTII,S$GLB,, | Performed by: PHYSICIAN ASSISTANT

## 2023-09-20 PROCEDURE — 90471 IMMUNIZATION ADMIN: CPT | Mod: S$GLB,,, | Performed by: INTERNAL MEDICINE

## 2023-09-20 PROCEDURE — 1160F PR REVIEW ALL MEDS BY PRESCRIBER/CLIN PHARMACIST DOCUMENTED: ICD-10-PCS | Mod: CPTII,S$GLB,, | Performed by: PHYSICIAN ASSISTANT

## 2023-09-20 PROCEDURE — 3008F BODY MASS INDEX DOCD: CPT | Mod: CPTII,S$GLB,, | Performed by: PHYSICIAN ASSISTANT

## 2023-09-20 PROCEDURE — 99999 PR PBB SHADOW E&M-EST. PATIENT-LVL IV: ICD-10-PCS | Mod: PBBFAC,,, | Performed by: PHYSICIAN ASSISTANT

## 2023-09-20 PROCEDURE — 3079F PR MOST RECENT DIASTOLIC BLOOD PRESSURE 80-89 MM HG: ICD-10-PCS | Mod: CPTII,S$GLB,, | Performed by: PHYSICIAN ASSISTANT

## 2023-09-20 PROCEDURE — 90471 FLU VACCINE (QUAD) GREATER THAN OR EQUAL TO 3YO PRESERVATIVE FREE IM: ICD-10-PCS | Mod: S$GLB,,, | Performed by: INTERNAL MEDICINE

## 2023-09-20 PROCEDURE — 3075F PR MOST RECENT SYSTOLIC BLOOD PRESS GE 130-139MM HG: ICD-10-PCS | Mod: CPTII,S$GLB,, | Performed by: PHYSICIAN ASSISTANT

## 2023-09-20 PROCEDURE — 1160F RVW MEDS BY RX/DR IN RCRD: CPT | Mod: CPTII,S$GLB,, | Performed by: PHYSICIAN ASSISTANT

## 2023-09-20 PROCEDURE — 90686 FLU VACCINE (QUAD) GREATER THAN OR EQUAL TO 3YO PRESERVATIVE FREE IM: ICD-10-PCS | Mod: S$GLB,,, | Performed by: INTERNAL MEDICINE
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
## 2023-09-20 NOTE — PATIENT INSTRUCTIONS
Hung Medina,     If you are due for any health screening(s) below please notify me so we can arrange them to be ordered and scheduled. Most healthy patients at your age complete them, but you are free to accept or refuse.     If you can't do it, I'll definitely understand. If you can, I'd certainly appreciate it!    All of your core healthy metrics are met.

## 2023-09-22 ENCOUNTER — LAB VISIT (OUTPATIENT)
Dept: PRIMARY CARE CLINIC | Facility: CLINIC | Age: 58
End: 2023-09-22
Payer: COMMERCIAL

## 2023-09-22 DIAGNOSIS — Z00.00 HEALTH CARE MAINTENANCE: ICD-10-CM

## 2023-09-22 LAB
25(OH)D3+25(OH)D2 SERPL-MCNC: 24 NG/ML (ref 30–96)
ALBUMIN SERPL BCP-MCNC: 3.4 G/DL (ref 3.5–5.2)
ALP SERPL-CCNC: 121 U/L (ref 55–135)
ALT SERPL W/O P-5'-P-CCNC: 19 U/L (ref 10–44)
ANION GAP SERPL CALC-SCNC: 7 MMOL/L (ref 8–16)
AST SERPL-CCNC: 23 U/L (ref 10–40)
BASOPHILS # BLD AUTO: 0.01 K/UL (ref 0–0.2)
BASOPHILS NFR BLD: 0.3 % (ref 0–1.9)
BILIRUB SERPL-MCNC: 0.2 MG/DL (ref 0.1–1)
BUN SERPL-MCNC: 9 MG/DL (ref 6–20)
CALCIUM SERPL-MCNC: 9 MG/DL (ref 8.7–10.5)
CHLORIDE SERPL-SCNC: 106 MMOL/L (ref 95–110)
CHOLEST SERPL-MCNC: 198 MG/DL (ref 120–199)
CHOLEST/HDLC SERPL: 4.8 {RATIO} (ref 2–5)
CO2 SERPL-SCNC: 28 MMOL/L (ref 23–29)
CREAT SERPL-MCNC: 0.8 MG/DL (ref 0.5–1.4)
DIFFERENTIAL METHOD: ABNORMAL
EOSINOPHIL # BLD AUTO: 0.1 K/UL (ref 0–0.5)
EOSINOPHIL NFR BLD: 2.5 % (ref 0–8)
ERYTHROCYTE [DISTWIDTH] IN BLOOD BY AUTOMATED COUNT: 13.2 % (ref 11.5–14.5)
EST. GFR  (NO RACE VARIABLE): >60 ML/MIN/1.73 M^2
ESTIMATED AVG GLUCOSE: 117 MG/DL (ref 68–131)
GLUCOSE SERPL-MCNC: 100 MG/DL (ref 70–110)
HBA1C MFR BLD: 5.7 % (ref 4–5.6)
HCT VFR BLD AUTO: 39.8 % (ref 37–48.5)
HDLC SERPL-MCNC: 41 MG/DL (ref 40–75)
HDLC SERPL: 20.7 % (ref 20–50)
HGB BLD-MCNC: 13.1 G/DL (ref 12–16)
IMM GRANULOCYTES # BLD AUTO: 0 K/UL (ref 0–0.04)
IMM GRANULOCYTES NFR BLD AUTO: 0 % (ref 0–0.5)
LDLC SERPL CALC-MCNC: 143.2 MG/DL (ref 63–159)
LYMPHOCYTES # BLD AUTO: 1.6 K/UL (ref 1–4.8)
LYMPHOCYTES NFR BLD: 48.6 % (ref 18–48)
MCH RBC QN AUTO: 28.4 PG (ref 27–31)
MCHC RBC AUTO-ENTMCNC: 32.9 G/DL (ref 32–36)
MCV RBC AUTO: 86 FL (ref 82–98)
MONOCYTES # BLD AUTO: 0.4 K/UL (ref 0.3–1)
MONOCYTES NFR BLD: 11.3 % (ref 4–15)
NEUTROPHILS # BLD AUTO: 1.2 K/UL (ref 1.8–7.7)
NEUTROPHILS NFR BLD: 37.3 % (ref 38–73)
NONHDLC SERPL-MCNC: 157 MG/DL
NRBC BLD-RTO: 0 /100 WBC
PLATELET # BLD AUTO: 278 K/UL (ref 150–450)
PMV BLD AUTO: 11.1 FL (ref 9.2–12.9)
POTASSIUM SERPL-SCNC: 3.9 MMOL/L (ref 3.5–5.1)
PROT SERPL-MCNC: 7.2 G/DL (ref 6–8.4)
RBC # BLD AUTO: 4.61 M/UL (ref 4–5.4)
SODIUM SERPL-SCNC: 141 MMOL/L (ref 136–145)
TRIGL SERPL-MCNC: 69 MG/DL (ref 30–150)
TSH SERPL DL<=0.005 MIU/L-ACNC: 1.92 UIU/ML (ref 0.4–4)
WBC # BLD AUTO: 3.19 K/UL (ref 3.9–12.7)

## 2023-09-22 PROCEDURE — 80061 LIPID PANEL: CPT | Performed by: PHYSICIAN ASSISTANT

## 2023-09-22 PROCEDURE — 80053 COMPREHEN METABOLIC PANEL: CPT | Performed by: PHYSICIAN ASSISTANT

## 2023-09-22 PROCEDURE — 84443 ASSAY THYROID STIM HORMONE: CPT | Performed by: PHYSICIAN ASSISTANT

## 2023-09-22 PROCEDURE — 82306 VITAMIN D 25 HYDROXY: CPT | Performed by: PHYSICIAN ASSISTANT

## 2023-09-22 PROCEDURE — 85025 COMPLETE CBC W/AUTO DIFF WBC: CPT | Performed by: PHYSICIAN ASSISTANT

## 2023-09-22 PROCEDURE — 83036 HEMOGLOBIN GLYCOSYLATED A1C: CPT | Performed by: PHYSICIAN ASSISTANT

## 2023-09-27 DIAGNOSIS — D72.819 LEUKOPENIA, UNSPECIFIED TYPE: Primary | ICD-10-CM

## 2023-10-09 ENCOUNTER — PATIENT MESSAGE (OUTPATIENT)
Dept: INTERNAL MEDICINE | Facility: CLINIC | Age: 58
End: 2023-10-09
Payer: COMMERCIAL

## 2023-10-26 ENCOUNTER — OFFICE VISIT (OUTPATIENT)
Dept: OBSTETRICS AND GYNECOLOGY | Facility: CLINIC | Age: 58
End: 2023-10-26
Attending: OBSTETRICS & GYNECOLOGY
Payer: COMMERCIAL

## 2023-10-26 VITALS
HEIGHT: 65 IN | DIASTOLIC BLOOD PRESSURE: 70 MMHG | SYSTOLIC BLOOD PRESSURE: 130 MMHG | BODY MASS INDEX: 39.97 KG/M2 | WEIGHT: 239.88 LBS

## 2023-10-26 DIAGNOSIS — Z00.00 HEALTH CARE MAINTENANCE: ICD-10-CM

## 2023-10-26 DIAGNOSIS — Z01.419 WELL FEMALE EXAM WITH ROUTINE GYNECOLOGICAL EXAM: Primary | ICD-10-CM

## 2023-10-26 PROBLEM — N95.1 MENOPAUSAL SYMPTOMS: Status: RESOLVED | Noted: 2018-01-09 | Resolved: 2023-10-26

## 2023-10-26 PROCEDURE — 3075F SYST BP GE 130 - 139MM HG: CPT | Mod: CPTII,S$GLB,, | Performed by: OBSTETRICS & GYNECOLOGY

## 2023-10-26 PROCEDURE — 99999 PR PBB SHADOW E&M-EST. PATIENT-LVL III: CPT | Mod: PBBFAC,,, | Performed by: OBSTETRICS & GYNECOLOGY

## 2023-10-26 PROCEDURE — 1159F PR MEDICATION LIST DOCUMENTED IN MEDICAL RECORD: ICD-10-PCS | Mod: CPTII,S$GLB,, | Performed by: OBSTETRICS & GYNECOLOGY

## 2023-10-26 PROCEDURE — 3078F PR MOST RECENT DIASTOLIC BLOOD PRESSURE < 80 MM HG: ICD-10-PCS | Mod: CPTII,S$GLB,, | Performed by: OBSTETRICS & GYNECOLOGY

## 2023-10-26 PROCEDURE — 1160F RVW MEDS BY RX/DR IN RCRD: CPT | Mod: CPTII,S$GLB,, | Performed by: OBSTETRICS & GYNECOLOGY

## 2023-10-26 PROCEDURE — 3075F PR MOST RECENT SYSTOLIC BLOOD PRESS GE 130-139MM HG: ICD-10-PCS | Mod: CPTII,S$GLB,, | Performed by: OBSTETRICS & GYNECOLOGY

## 2023-10-26 PROCEDURE — 3044F HG A1C LEVEL LT 7.0%: CPT | Mod: CPTII,S$GLB,, | Performed by: OBSTETRICS & GYNECOLOGY

## 2023-10-26 PROCEDURE — 99396 PREV VISIT EST AGE 40-64: CPT | Mod: S$GLB,,, | Performed by: OBSTETRICS & GYNECOLOGY

## 2023-10-26 PROCEDURE — 3008F PR BODY MASS INDEX (BMI) DOCUMENTED: ICD-10-PCS | Mod: CPTII,S$GLB,, | Performed by: OBSTETRICS & GYNECOLOGY

## 2023-10-26 PROCEDURE — 1159F MED LIST DOCD IN RCRD: CPT | Mod: CPTII,S$GLB,, | Performed by: OBSTETRICS & GYNECOLOGY

## 2023-10-26 PROCEDURE — 99999 PR PBB SHADOW E&M-EST. PATIENT-LVL III: ICD-10-PCS | Mod: PBBFAC,,, | Performed by: OBSTETRICS & GYNECOLOGY

## 2023-10-26 PROCEDURE — 1160F PR REVIEW ALL MEDS BY PRESCRIBER/CLIN PHARMACIST DOCUMENTED: ICD-10-PCS | Mod: CPTII,S$GLB,, | Performed by: OBSTETRICS & GYNECOLOGY

## 2023-10-26 PROCEDURE — 3044F PR MOST RECENT HEMOGLOBIN A1C LEVEL <7.0%: ICD-10-PCS | Mod: CPTII,S$GLB,, | Performed by: OBSTETRICS & GYNECOLOGY

## 2023-10-26 PROCEDURE — 3008F BODY MASS INDEX DOCD: CPT | Mod: CPTII,S$GLB,, | Performed by: OBSTETRICS & GYNECOLOGY

## 2023-10-26 PROCEDURE — 3078F DIAST BP <80 MM HG: CPT | Mod: CPTII,S$GLB,, | Performed by: OBSTETRICS & GYNECOLOGY

## 2023-10-26 PROCEDURE — 99396 PR PREVENTIVE VISIT,EST,40-64: ICD-10-PCS | Mod: S$GLB,,, | Performed by: OBSTETRICS & GYNECOLOGY

## 2023-10-26 NOTE — PROGRESS NOTES
SUBJECTIVE:   58 y.o. female   for routine gyn exam. No LMP recorded. Patient has had a hysterectomy..  She has no unusual complaints. No HRT.         Past Medical History:   Diagnosis Date    GERD (gastroesophageal reflux disease)     Heart palpitations 2018     Past Surgical History:   Procedure Laterality Date    BREAST BIOPSY Left      SECTION, CLASSIC  1990    HYSTERECTOMY      fibroids, ovaries remain    lumpectomy Right     benign     Social History     Socioeconomic History    Marital status:    Tobacco Use    Smoking status: Never    Smokeless tobacco: Never   Substance and Sexual Activity    Alcohol use: Yes     Comment: rarely    Drug use: No    Sexual activity: Yes     Partners: Male     Birth control/protection: See Surgical Hx     Comment: Hyst-2014     Social Determinants of Health     Financial Resource Strain: Low Risk  (2023)    Overall Financial Resource Strain (CARDIA)     Difficulty of Paying Living Expenses: Not hard at all   Food Insecurity: No Food Insecurity (2023)    Hunger Vital Sign     Worried About Running Out of Food in the Last Year: Never true     Ran Out of Food in the Last Year: Never true   Transportation Needs: No Transportation Needs (2023)    PRAPARE - Transportation     Lack of Transportation (Medical): No     Lack of Transportation (Non-Medical): No   Physical Activity: Insufficiently Active (2023)    Exercise Vital Sign     Days of Exercise per Week: 2 days     Minutes of Exercise per Session: 30 min   Stress: No Stress Concern Present (2023)    [employer] [place] [employer] - Occupational Stress Questionnaire     Feeling of Stress : Only a little   Social Connections: Unknown (2023)    Social Connection and Isolation Panel [NHANES]     Frequency of Communication with Friends and Family: Three times a week     Frequency of Social Gatherings with Friends and Family: Once a week     Active Member  of Clubs or Organizations: Yes     Attends Club or Organization Meetings: 1 to 4 times per year     Marital Status:    Housing Stability: Low Risk  (2023)    Housing Stability Vital Sign     Unable to Pay for Housing in the Last Year: No     Number of Places Lived in the Last Year: 1     Unstable Housing in the Last Year: No     Family History   Problem Relation Age of Onset    Hypertension Father     Hypertension Mother     Diabetes Mother     Ovarian cancer Mother 73        type? survived 3m after dx    Diabetes Brother     Vulvar Cancer Sister         s/p XRT    Breast cancer Sister 60        uni/bilat? s/p double mast; s/p chemo    Hypertension Sister     Breast cancer Sister 52        unilat? suspected s/p chemo    Hypertension Sister     Diabetes Sister     Hypertension Sister     No Known Problems Son     No Known Problems Son     Cancer Cousin         origin? abdominal?    Cancer Cousin         origin? lung?    Cancer Cousin         suspected; origin?    Breast cancer Other     Stomach cancer Neg Hx     Colon cancer Neg Hx     Esophageal cancer Neg Hx      OB History    Para Term  AB Living   3 2 2   1 2   SAB IAB Ectopic Multiple Live Births   1       2      # Outcome Date GA Lbr Derick/2nd Weight Sex Delivery Anes PTL Lv   3 SAB            2 Term            1 Term                  No current outpatient medications on file.     No current facility-administered medications for this visit.     Allergies: Grass pollen- grass standard and House dust     ROS:  Constitutional: no weight loss, weight gain, fever, fatigue  Eyes:  No vision changes, glasses/contacts  ENT/Mouth: No ulcers, sinus problems, ears ringing, headache  Cardiovascular: No inability to lie flat, chest pain, exercise intolerance, swelling, heart palpitations  Respiratory: No wheezing, coughing blood, shortness of breath, or cough  Gastrointestinal: No diarrhea, bloody stool, nausea/vomiting, constipation, gas,  "hemorrhoids  Genitourinary: No blood in urine, painful urination, urgency of urination, frequency of urination, incomplete emptying, incontinence, abnormal bleeding, painful periods, heavy periods, vaginal discharge, vaginal odor, painful intercourse, sexual problems, bleeding after intercourse.  Musculoskeletal: No muscle weakness  Skin/Breast: No painful breasts, nipple discharge, masses, rash, ulcers  Neurological: No passing out, seizures, numbness, headache  Endocrine: No diabetes, hypothyroid, hyperthyroid, hot flashes, hair loss, abnormal hair growth, acne  Psychiatric: No depression, crying  Hematologic: No bruises, bleeding, swollen lymph nodes, anemia.      OBJECTIVE:   The patient appears well, alert, oriented x 3, in no distress.  /70 (BP Location: Right arm, Patient Position: Sitting, BP Method: Large (Manual))   Ht 5' 5" (1.651 m)   Wt 108.8 kg (239 lb 13.8 oz)   BMI 39.91 kg/m²   NECK: no thyromegaly, trachea midline  SKIN: no acne, striae, hirsutism  CHEST: CTAB  CV: RRR  BREAST EXAM: breasts appear normal, no suspicious masses, no skin or nipple changes or axillary nodes  ABDOMEN: no hernias, masses, or hepatosplenomegaly  GENITALIA: normal external genitalia, no erythema, no discharge  URETHRA: normal urethra, normal urethral meatus  VAGINA: Normal  CERVIX: absent  UTERUS: absent  ADNEXA: no mass, fullness, tenderness      ASSESSMENT:   1. Well female exam with routine gynecological exam  CANCELED: Liquid-Based Pap Smear, Screening      2. Health care maintenance  Ambulatory referral/consult to Gynecology          PLAN:      Discussed healthy lifestyle including regular exercise, healthy eating, etc.  Return to clinic in 1 year    "

## 2023-11-17 ENCOUNTER — LAB VISIT (OUTPATIENT)
Dept: PRIMARY CARE CLINIC | Facility: CLINIC | Age: 58
End: 2023-11-17
Payer: COMMERCIAL

## 2023-11-17 ENCOUNTER — PATIENT MESSAGE (OUTPATIENT)
Dept: ADMINISTRATIVE | Facility: OTHER | Age: 58
End: 2023-11-17
Payer: COMMERCIAL

## 2023-11-17 DIAGNOSIS — D72.819 LEUKOPENIA, UNSPECIFIED TYPE: ICD-10-CM

## 2023-11-17 LAB
BASOPHILS # BLD AUTO: 0.02 K/UL (ref 0–0.2)
BASOPHILS NFR BLD: 0.5 % (ref 0–1.9)
DIFFERENTIAL METHOD: ABNORMAL
EOSINOPHIL # BLD AUTO: 0.1 K/UL (ref 0–0.5)
EOSINOPHIL NFR BLD: 1.9 % (ref 0–8)
ERYTHROCYTE [DISTWIDTH] IN BLOOD BY AUTOMATED COUNT: 13.2 % (ref 11.5–14.5)
HCT VFR BLD AUTO: 43.6 % (ref 37–48.5)
HGB BLD-MCNC: 13.5 G/DL (ref 12–16)
IMM GRANULOCYTES # BLD AUTO: 0.01 K/UL (ref 0–0.04)
IMM GRANULOCYTES NFR BLD AUTO: 0.2 % (ref 0–0.5)
LYMPHOCYTES # BLD AUTO: 2.1 K/UL (ref 1–4.8)
LYMPHOCYTES NFR BLD: 47.9 % (ref 18–48)
MCH RBC QN AUTO: 28.4 PG (ref 27–31)
MCHC RBC AUTO-ENTMCNC: 31 G/DL (ref 32–36)
MCV RBC AUTO: 92 FL (ref 82–98)
MONOCYTES # BLD AUTO: 0.3 K/UL (ref 0.3–1)
MONOCYTES NFR BLD: 6.3 % (ref 4–15)
NEUTROPHILS # BLD AUTO: 1.9 K/UL (ref 1.8–7.7)
NEUTROPHILS NFR BLD: 43.2 % (ref 38–73)
NRBC BLD-RTO: 0 /100 WBC
PLATELET # BLD AUTO: 275 K/UL (ref 150–450)
PMV BLD AUTO: 11.8 FL (ref 9.2–12.9)
RBC # BLD AUTO: 4.76 M/UL (ref 4–5.4)
WBC # BLD AUTO: 4.28 K/UL (ref 3.9–12.7)

## 2023-11-17 PROCEDURE — 85025 COMPLETE CBC W/AUTO DIFF WBC: CPT | Performed by: PHYSICIAN ASSISTANT

## 2023-11-19 ENCOUNTER — PATIENT MESSAGE (OUTPATIENT)
Dept: INTERNAL MEDICINE | Facility: CLINIC | Age: 58
End: 2023-11-19
Payer: COMMERCIAL

## 2023-11-20 ENCOUNTER — PATIENT MESSAGE (OUTPATIENT)
Dept: RESEARCH | Facility: CLINIC | Age: 58
End: 2023-11-20
Payer: COMMERCIAL

## 2023-11-20 ENCOUNTER — OFFICE VISIT (OUTPATIENT)
Dept: INTERNAL MEDICINE | Facility: CLINIC | Age: 58
End: 2023-11-20
Payer: COMMERCIAL

## 2023-11-20 DIAGNOSIS — Z86.79 HISTORY OF HYPERTENSION: Primary | ICD-10-CM

## 2023-11-20 DIAGNOSIS — D72.819 LEUKOPENIA, UNSPECIFIED TYPE: ICD-10-CM

## 2023-11-20 DIAGNOSIS — E55.9 VITAMIN D DEFICIENCY: ICD-10-CM

## 2023-11-20 PROCEDURE — 1159F MED LIST DOCD IN RCRD: CPT | Mod: CPTII,95,, | Performed by: PHYSICIAN ASSISTANT

## 2023-11-20 PROCEDURE — 1160F PR REVIEW ALL MEDS BY PRESCRIBER/CLIN PHARMACIST DOCUMENTED: ICD-10-PCS | Mod: CPTII,95,, | Performed by: PHYSICIAN ASSISTANT

## 2023-11-20 PROCEDURE — 1160F RVW MEDS BY RX/DR IN RCRD: CPT | Mod: CPTII,95,, | Performed by: PHYSICIAN ASSISTANT

## 2023-11-20 PROCEDURE — 99213 PR OFFICE/OUTPT VISIT, EST, LEVL III, 20-29 MIN: ICD-10-PCS | Mod: 95,,, | Performed by: PHYSICIAN ASSISTANT

## 2023-11-20 PROCEDURE — 3044F PR MOST RECENT HEMOGLOBIN A1C LEVEL <7.0%: ICD-10-PCS | Mod: CPTII,95,, | Performed by: PHYSICIAN ASSISTANT

## 2023-11-20 PROCEDURE — 99213 OFFICE O/P EST LOW 20 MIN: CPT | Mod: 95,,, | Performed by: PHYSICIAN ASSISTANT

## 2023-11-20 PROCEDURE — 1159F PR MEDICATION LIST DOCUMENTED IN MEDICAL RECORD: ICD-10-PCS | Mod: CPTII,95,, | Performed by: PHYSICIAN ASSISTANT

## 2023-11-20 PROCEDURE — 3044F HG A1C LEVEL LT 7.0%: CPT | Mod: CPTII,95,, | Performed by: PHYSICIAN ASSISTANT

## 2023-11-20 RX ORDER — CHOLECALCIFEROL (VITAMIN D3) 25 MCG
1000 TABLET ORAL DAILY
COMMUNITY

## 2023-11-20 NOTE — PROGRESS NOTES
Subjective:       Patient ID: Carol Collins is a 58 y.o. female.        Chief Complaint: Follow-up    Carol Collins is an established patient of Chiquis Messina MD here today for follow up visit.    The patient location is: her home  The chief complaint leading to consultation is: follow up    Visit type: audiovisual    Face to Face time with patient: 12 minutes  20 minutes of total time spent on the encounter, which includes face to face time and non-face to face time preparing to see the patient (eg, review of tests), Obtaining and/or reviewing separately obtained history, Documenting clinical information in the electronic or other health record, Independently interpreting results (not separately reported) and communicating results to the patient/family/caregiver, or Care coordination (not separately reported).         Each patient to whom he or she provides medical services by telemedicine is:  (1) informed of the relationship between the physician and patient and the respective role of any other health care provider with respect to management of the patient; and (2) notified that he or she may decline to receive medical services by telemedicine and may withdraw from such care at any time.    Notes:   Covid vaccine, chills/shivering, x 2 days, then resolved, happened with prior vaccines as well  Would like to review pre visit labs    Lab Results       Component                Value               Date                       CHOL                        198                 09/22/2023                 TRIG                          69                  09/22/2023                 HDL                           41                  09/22/2023                 LDLCALC                  143.2              09/22/2023              The 10-year ASCVD risk score (Olive BEGUM, et al., 2019) is: 5.9%    Values used to calculate the score:      Age: 58 years      Sex: Female      Is Non- : Yes       Diabetic: No      Tobacco smoker: No      Systolic Blood Pressure: 130 mmHg      Is BP treated: No      HDL Cholesterol: 41 mg/dL      Total Cholesterol: 198 mg/dL    Lab Results       Component                Value               Date                       HGBA1C                   5.7 (H)             09/22/2023                 HGBA1C                   5.7 (H)             12/05/2022                 HGBA1C                   5.7 (H)             02/27/2019              WBC count normalized             Review of Systems   Constitutional:  Positive for activity change. Negative for chills, diaphoresis, fatigue, fever and unexpected weight change.   HENT:  Negative for congestion, hearing loss, rhinorrhea, sore throat and trouble swallowing.    Eyes:  Negative for discharge and visual disturbance.   Respiratory:  Negative for cough, chest tightness, shortness of breath and wheezing.    Cardiovascular:  Negative for chest pain, palpitations and leg swelling.   Gastrointestinal:  Negative for abdominal pain, blood in stool, constipation, diarrhea, nausea and vomiting.   Endocrine: Negative for polydipsia and polyuria.   Genitourinary:  Negative for difficulty urinating, dysuria, frequency, hematuria, menstrual problem and urgency.   Musculoskeletal:  Negative for arthralgias, back pain, joint swelling and neck pain.   Skin:  Negative for rash.   Neurological:  Negative for dizziness, syncope, weakness and headaches.   Psychiatric/Behavioral:  Negative for confusion, dysphoric mood and sleep disturbance. The patient is not nervous/anxious.        Objective:      Physical Exam  Pulmonary:      Effort: Pulmonary effort is normal.   Abdominal:      General: Abdomen is flat.   Neurological:      General: No focal deficit present.      Mental Status: She is alert.   Psychiatric:         Mood and Affect: Mood normal.         Assessment:       1. History of hypertension - off amlodipine since 1/2023    2. Leukopenia, unspecified  "type    3. Vitamin D deficiency        Plan:       Carol was seen today for follow-up.    Diagnoses and all orders for this visit:    History of hypertension - off amlodipine since 1/2023    Leukopenia, unspecified type - normalized    Vitamin D deficiency - start vitamin d 1000 IU OTC daily    Pt has been given instructions populated from patient instructions database and has verbalized understanding of the after visit summary and information contained wherein.    Follow up with a primary care provider. May go to ER for acute shortness of breath, lightheadedness, fever, or any other emergent complaints or changes in condition.    "This note will be shared with the patient"    No future appointments.              "

## 2023-11-24 PROBLEM — E55.9 VITAMIN D DEFICIENCY: Status: ACTIVE | Noted: 2023-11-24

## 2023-11-27 ENCOUNTER — DOCUMENTATION ONLY (OUTPATIENT)
Dept: RESEARCH | Facility: CLINIC | Age: 58
End: 2023-11-27
Payer: COMMERCIAL

## 2023-11-27 DIAGNOSIS — Z00.6 RESEARCH SUBJECT: Primary | ICD-10-CM

## 2023-11-27 NOTE — PROGRESS NOTES
"  PROPEL IT Weight Loss: Eligibility Confirmation  Remember to add Participant ID in Research Study    Age as of Today: 58 y.o.  Is patient age 40 to 70 years Yes    Is patient race Black/: Epic: Black or      Primary care provider at [facility]: Chiquis Messina MD     Does the patient have an active [facility] portal account?    Yes  _____________________________________________  Does the patient have prediabetes or Type 2 diabetes?   Yes    LAST HBA1C   Lab Results   Component Value Date    HGBA1C 5.7 (H) 09/22/2023    HGBA1C 5.7 (H) 12/05/2022    HGBA1C 5.7 (H) 02/27/2019        If yes, Based on: (Prediabetes) HbA1c 5.7- 6.4%  _____________________________________________  LAST BMI:   BMI Readings from Last 1 Encounters:   10/26/23 39.91 kg/m²        Was the patient's most recent BMI (within the past 12 months) between 30 and 50    Note:If pt has a BMI in 29s or 51s, answer "No" as usual but [facility] will hold onto as potentially eligible in REDCap.   yes  _______________________________________________________  PCP REFERRAL    Tip: Chart Search "propel" for Orders.   Did provider acknowledge or deny patient's participation?   Reminder: If PCP initiated,  go to Research Studies>Participant Details>change Status to "Identified">enter [place] Date (Date of referral order) & Comment "______ referred pt"  Need to Pend Order to PCP    ___________________________________________  PROPEL-IT Screening Date (enter date from [place]): 11/24/2023    LAST WEIGHT    Reminder: Verify if this was an outpatient measurement (e.g. not emergency room)  Wt Readings from Last 4 Encounters:   10/26/23 108.8 kg (239 lb 13.8 oz)   09/20/23 108.4 kg (238 lb 15.7 oz)   04/03/23 108.1 kg (238 lb 5.1 oz)   01/30/23 106.9 kg (235 lb 12.5 oz)        Does the patient have a baseline clinic weight collected within 4 weeks (34 days) of Screening Date?  Yes, has baseline clinical " "wt  ________________________________________               When is the patient's next scheduled clinic appt   date and department (potential wt)?: no upcoming appointments    What is the patient's PCP Clinic? :   (to help determine Weigh Only visit is available;   Tip: Look in Appointments-"Department")?    [facility]        Status Updated: 11/27/2023      PROPEL IT CONSENT DOCUMENTATION  Oncore Protocol 2022013: PROPEL IT  PROmoting Successful Weight Loss in Primary CarE in [place] (PROPEL) using Information Technology  : Andrew Cehung, PhD.  IRB of Record: [facility] ([facility]) ID# [facility] [id number]  [facility] Investigator: Shanti Browning MD      Informed Consent Process   Name of Study Team member Present for discussion: N/A   Prior to the Informed Consent being signed or any protocol required testing, procedure or intervention being performed, the following was completed or discussed:   Purpose of the study, qualifications to participate:  Study design, schedule and procedure:  Risks, benefits, alternative treatments, compensation and costs:  Confidentiality and HIPAA authorization for Release of Medical Records for the research trial/subjects right/study related injury:  Study related contact information:  Voluntary participation and withdrawal from the research trial at any time:  Patient has been offered the opportunity to ask questions regarding the study    and PI contact information given to subject:  Signed copy of consent form was provided to the subject via eMail:  Original consent or copy of electronic consent in subject's chart and uploaded in EPIC:     _________________________________________________________       Carol Collins electronically signed the informed consent form.     Was the consent done electronically: Yes  Consent Signature Date (add to  note) ("Today's Date REDCap): " 11/24/2023  The consent form was reviewed by Elizabeth Nichole  Name: ([facility] personnel reviewing consent form):  Cedric San, Clinical Research Coordinator  Is the potential subject currently enrolled in any other research trials (per Epic)? No  Participant ID (REDCap ID) added to Research Study   Yes    2022013 - PROPEL IT   Status Interested (11/17/2023)       Comments: See  for Consent Forms        I acknowledge that I have reviewed the informed consent form and viewed the volunteer's electronically signed and dated the signature section of the consent forms.    Yes

## 2023-11-27 NOTE — PROGRESS NOTES
"Cedric San, Chiquis ROBLES MD; Siddharth Sim Staff1 hour ago (12:11 PM)     [facility] Dr. Messina,  Carol Collins  consented to participate in PROPEL-IT weight loss study. This is a low-risk intervention of virtual health coaching with registered dieticians at [facility].  - This internal referral will show up in the Patient Calls InBasket.    - Scroll to "Orders Placed in this Encounter".  - Under "Pending" Click on the name: ambulatory referral/consult to [employer] Weight [facility] (aka PROPEL) [id number] to review and sign.    The authorized referral will show up in our study team's Epic workqueue.    Please do not hesitate to contact if you have questions or cannot find the order.  Cedric San, Clinical Research Coordinator     "

## 2023-11-29 ENCOUNTER — RESEARCH ENCOUNTER (OUTPATIENT)
Dept: RESEARCH | Facility: CLINIC | Age: 58
End: 2023-11-29
Payer: COMMERCIAL

## 2023-11-29 DIAGNOSIS — Z00.6 RESEARCH SUBJECT: Primary | ICD-10-CM

## 2023-12-01 ENCOUNTER — TELEPHONE (OUTPATIENT)
Dept: RESEARCH | Facility: CLINIC | Age: 58
End: 2023-12-01
Payer: COMMERCIAL

## 2023-12-01 NOTE — TELEPHONE ENCOUNTER
"Scale Delivery Service Confirmation Date in REDCap: 11/30/2023  Address delivered To in [facility]: [id number]  [place]  [place] [address]    Study Overview explained ([facility] , [facility] team, [facility] Research Coordinator) yes    Scale Received? YES    Pt has "first weights" to share: no    RECEIVED SCALE ONLY(Has Not Set Up Scale Yet)  BodyTrace Scale Basic Tips reviewed (scale surface, morning, minimal clothing, not waterproof) yes    Pt requested assistance (to talk through) for the scale setup:   Yes AND scale worked properly  ________________________________________  Email Reminders explained: Explain any No/NA  Lets take you first PROPEL weight containing link to enter weights Yes (please explain)     Check off "Call completed" on Intervention Onboarding form to trigger "first weight email"Yes     EVERYONE  Health  visit scheduled for 12/15/2023 at 8:00 am with Arianne Novoa.  Comments: Unanswered questions: None    Remind pt to save the Health  number to their contacts: [phone]  _________________________________?   Post Call Follow-up: None      Reminders:   Research Studies: Add Branch B Intervention  Research Study Calendar: Edit Plan> Visit 1 Planned for Date >select date of 1st scheduled visit  Episodes of Care: PROPEL; PROPEL IT - ARM B  Care Teams: [facility] coaches, Users, Admission notification, End Date (2y +1M)   "

## 2023-12-11 ENCOUNTER — PATIENT MESSAGE (OUTPATIENT)
Dept: INTERNAL MEDICINE | Facility: CLINIC | Age: 58
End: 2023-12-11
Payer: COMMERCIAL

## 2023-12-14 ENCOUNTER — OFFICE VISIT (OUTPATIENT)
Dept: INTERNAL MEDICINE | Facility: CLINIC | Age: 58
End: 2023-12-14
Payer: COMMERCIAL

## 2023-12-14 VITALS
OXYGEN SATURATION: 99 % | BODY MASS INDEX: 39.75 KG/M2 | HEART RATE: 73 BPM | SYSTOLIC BLOOD PRESSURE: 130 MMHG | WEIGHT: 238.56 LBS | DIASTOLIC BLOOD PRESSURE: 80 MMHG | HEIGHT: 65 IN

## 2023-12-14 DIAGNOSIS — R35.0 URINARY FREQUENCY: ICD-10-CM

## 2023-12-14 DIAGNOSIS — R32 URINARY INCONTINENCE, UNSPECIFIED TYPE: ICD-10-CM

## 2023-12-14 DIAGNOSIS — B37.31 YEAST VAGINITIS: Primary | ICD-10-CM

## 2023-12-14 DIAGNOSIS — R39.15 URINARY URGENCY: ICD-10-CM

## 2023-12-14 PROCEDURE — 3079F DIAST BP 80-89 MM HG: CPT | Mod: CPTII,S$GLB,, | Performed by: PHYSICIAN ASSISTANT

## 2023-12-14 PROCEDURE — 1160F PR REVIEW ALL MEDS BY PRESCRIBER/CLIN PHARMACIST DOCUMENTED: ICD-10-PCS | Mod: CPTII,S$GLB,, | Performed by: PHYSICIAN ASSISTANT

## 2023-12-14 PROCEDURE — 3075F PR MOST RECENT SYSTOLIC BLOOD PRESS GE 130-139MM HG: ICD-10-PCS | Mod: CPTII,S$GLB,, | Performed by: PHYSICIAN ASSISTANT

## 2023-12-14 PROCEDURE — 3044F HG A1C LEVEL LT 7.0%: CPT | Mod: CPTII,S$GLB,, | Performed by: PHYSICIAN ASSISTANT

## 2023-12-14 PROCEDURE — 81003 URINALYSIS AUTO W/O SCOPE: CPT | Performed by: PHYSICIAN ASSISTANT

## 2023-12-14 PROCEDURE — 99999 PR PBB SHADOW E&M-EST. PATIENT-LVL IV: CPT | Mod: PBBFAC,,, | Performed by: PHYSICIAN ASSISTANT

## 2023-12-14 PROCEDURE — 99999 PR PBB SHADOW E&M-EST. PATIENT-LVL IV: ICD-10-PCS | Mod: PBBFAC,,, | Performed by: PHYSICIAN ASSISTANT

## 2023-12-14 PROCEDURE — 3008F BODY MASS INDEX DOCD: CPT | Mod: CPTII,S$GLB,, | Performed by: PHYSICIAN ASSISTANT

## 2023-12-14 PROCEDURE — 1159F MED LIST DOCD IN RCRD: CPT | Mod: CPTII,S$GLB,, | Performed by: PHYSICIAN ASSISTANT

## 2023-12-14 PROCEDURE — 3008F PR BODY MASS INDEX (BMI) DOCUMENTED: ICD-10-PCS | Mod: CPTII,S$GLB,, | Performed by: PHYSICIAN ASSISTANT

## 2023-12-14 PROCEDURE — 3079F PR MOST RECENT DIASTOLIC BLOOD PRESSURE 80-89 MM HG: ICD-10-PCS | Mod: CPTII,S$GLB,, | Performed by: PHYSICIAN ASSISTANT

## 2023-12-14 PROCEDURE — 3075F SYST BP GE 130 - 139MM HG: CPT | Mod: CPTII,S$GLB,, | Performed by: PHYSICIAN ASSISTANT

## 2023-12-14 PROCEDURE — 99213 PR OFFICE/OUTPT VISIT, EST, LEVL III, 20-29 MIN: ICD-10-PCS | Mod: S$GLB,,, | Performed by: PHYSICIAN ASSISTANT

## 2023-12-14 PROCEDURE — 1160F RVW MEDS BY RX/DR IN RCRD: CPT | Mod: CPTII,S$GLB,, | Performed by: PHYSICIAN ASSISTANT

## 2023-12-14 PROCEDURE — 1159F PR MEDICATION LIST DOCUMENTED IN MEDICAL RECORD: ICD-10-PCS | Mod: CPTII,S$GLB,, | Performed by: PHYSICIAN ASSISTANT

## 2023-12-14 PROCEDURE — 3044F PR MOST RECENT HEMOGLOBIN A1C LEVEL <7.0%: ICD-10-PCS | Mod: CPTII,S$GLB,, | Performed by: PHYSICIAN ASSISTANT

## 2023-12-14 PROCEDURE — 99213 OFFICE O/P EST LOW 20 MIN: CPT | Mod: S$GLB,,, | Performed by: PHYSICIAN ASSISTANT

## 2023-12-14 RX ORDER — FLUCONAZOLE 150 MG/1
150 TABLET ORAL DAILY
Qty: 1 TABLET | Refills: 0 | Status: SHIPPED | OUTPATIENT
Start: 2023-12-14 | End: 2023-12-15

## 2023-12-14 RX ORDER — OXYBUTYNIN CHLORIDE 5 MG/1
5 TABLET, EXTENDED RELEASE ORAL DAILY
Qty: 30 TABLET | Refills: 2 | Status: SHIPPED | OUTPATIENT
Start: 2023-12-14 | End: 2024-03-04

## 2023-12-14 NOTE — PROGRESS NOTES
Subjective:       Patient ID: Carol Collins is a 58 y.o. female.        Chief Complaint: bladder leaks    Carol Collins is an established patient of Chiquis Messina MD here today for urgent care visit.    Bladder leakage, long standing for years  Going frequently, urgency, ongoing for years  Vaginal itching, used monistat one day dose, which resolved the itching mostly  Wears pads daily  Goes twice during the night  No vaginal discharge  No pelvic pain           Review of Systems   Constitutional:  Negative for chills, diaphoresis, fatigue and fever.   HENT:  Negative for congestion and sore throat.    Eyes:  Negative for visual disturbance.   Respiratory:  Negative for cough, chest tightness and shortness of breath.    Cardiovascular:  Negative for chest pain, palpitations and leg swelling.   Gastrointestinal:  Negative for abdominal pain, blood in stool, constipation, diarrhea, nausea and vomiting.   Genitourinary:  Positive for frequency and urgency. Negative for dysuria and hematuria.   Musculoskeletal:  Negative for arthralgias and back pain.   Skin:  Negative for rash.   Neurological:  Negative for dizziness, syncope, weakness and headaches.   Psychiatric/Behavioral:  Negative for dysphoric mood and sleep disturbance. The patient is not nervous/anxious.        Objective:      Physical Exam  Vitals and nursing note reviewed.   Constitutional:       Appearance: Normal appearance. She is well-developed.   HENT:      Head: Normocephalic.      Right Ear: External ear normal.      Left Ear: External ear normal.   Eyes:      Pupils: Pupils are equal, round, and reactive to light.   Cardiovascular:      Rate and Rhythm: Normal rate and regular rhythm.      Heart sounds: Normal heart sounds. No murmur heard.     No friction rub. No gallop.   Pulmonary:      Effort: Pulmonary effort is normal. No respiratory distress.      Breath sounds: Normal breath sounds.   Abdominal:      Palpations: Abdomen is soft.       "Tenderness: There is no abdominal tenderness.   Skin:     General: Skin is warm and dry.   Neurological:      Mental Status: She is alert.         Assessment:       1. Yeast vaginitis    2. Urinary frequency    3. Urinary urgency    4. Urinary incontinence, unspecified type        Plan:       Carol was seen today for bladder leaks.    Diagnoses and all orders for this visit:    Yeast vaginitis  -     fluconazole (DIFLUCAN) 150 MG Tab; Take 1 tablet (150 mg total) by mouth once daily. for 1 day    Urinary frequency  -     Urinalysis, Reflex to Urine Culture Urine, Clean Catch  -     oxybutynin (DITROPAN-XL) 5 MG TR24; Take 1 tablet (5 mg total) by mouth once daily.  -     Ambulatory referral/consult to Urogynecology; Future    Urinary urgency  -     Urinalysis, Reflex to Urine Culture Urine, Clean Catch  -     oxybutynin (DITROPAN-XL) 5 MG TR24; Take 1 tablet (5 mg total) by mouth once daily.  -     Ambulatory referral/consult to Urogynecology; Future    Urinary incontinence, unspecified type  -     Urinalysis, Reflex to Urine Culture Urine, Clean Catch  -     oxybutynin (DITROPAN-XL) 5 MG TR24; Take 1 tablet (5 mg total) by mouth once daily.  -     Ambulatory referral/consult to Urogynecology; Future    Pt has been given instructions populated from patient instructions database and has verbalized understanding of the after visit summary and information contained wherein.    Follow up with a primary care provider. May go to ER for acute shortness of breath, lightheadedness, fever, or any other emergent complaints or changes in condition.    "This note will be shared with the patient"    Future Appointments   Date Time Provider Department Center   12/22/2023  8:30 AM Samba Ads-Mayi ZhaopinCOACH, THREE [facility] MARIBEL Brice [facility]   1/12/2024  8:00 AM PROPEL-Q-Sensei HEALTHCOACH, THREE [facility] MARIBEL Brice W   1/19/2024  8:30 AM PROPEL-IT HEALTHCOACH, THREE [facility] MARIBEL Brice [facility]   1/22/2024 10:00 AM Chayito, " Maisha GALLOWAY NP [facility] UROGYN [facility]   1/26/2024  8:00 AM PROPEL-IT HEALTHCOACH, THREE [facility] PROWEI Juanjose Everetty PCW   2/2/2024  8:00 AM PROPEL-IT HEALTHCOACH, THREE [facility] PROWEI [address] PCW   2/9/2024  8:00 AM PROPEL-IT HEALTHCOACH, THREE [facility] PROWEI Juanjose Everetty PCW   2/16/2024  8:30 AM PROPEL-IT HEALTHCOACH, THREE [facility] PROWEI Juanjose Everetty PCW   2/23/2024  8:00 AM PROPEL-IT HEALTHCOACH, THREE [facility] PROWEI Juanjose Everetty PCW   3/1/2024  8:00 AM PROPEL-IT HEALTHCOACH, THREE [facility] PROWEI Juanjose Everetty PCW   3/8/2024  8:00 AM PROPEL-IT HEALTHCOACH, THREE [facility] PROWEI Juanjose Everetty PCW   3/15/2024  8:30 AM PROPEL-IT HEALTHCOACH, THREE [facility] PROWEI Juanjose Everetty PCW   3/22/2024  8:00 AM PROPEL-IT HEALTHCOACH, THREE [facility] PROPATRICI Juanjose Everetty PCW   4/1/2024  8:00 AM PROPEL-IT HEALTHCOACH, THREE [facility] PROWEI Juanjose Everetty PCW   4/5/2024  8:00 AM PROPEL-IT HEALTHCOACH, THREE [facility] PROWEI Juanjose Everetty PCW   4/12/2024  8:00 AM PROPEL-IT HEALTHCOACH, THREE [facility] PROWEI Juanjose Everetty PCW   4/19/2024  8:30 AM PROPEL-IT HEALTHCOACH, THREE [facility] PROWEI Juanjose Everetty PCW   4/26/2024  8:00 AM PROPEL-IT HEALTHCOACH, THREE [facility] PROPATRICI Juanjose Everetty PCW   5/3/2024  8:00 AM PROPEL-IT HEALTHCOACH, THREE [facility] PROWEI Juanjose Everetty PCW   5/10/2024  8:00 AM PROPEL-IT HEALTHCOACH, THREE [facility] PROWEI Juanjose Brice PCW   5/17/2024  8:30 AM PROPEL-IT HEALTHCOACH, THREE [facility] PROPATRICI Juanjose Yuniel PCW   5/24/2024  8:00 AM PROPEL-IT HEALTHCOACH, THREE [facility] ADARSHSHARYN Brice PCW   6/21/2024  8:30 AM PROPEL-IT HEALTHCOACH, THREE [facility] ADARSHI Juanjose Margueriteannie PCW   7/19/2024  8:30 AM PROPEL-IT HEALTHCOACH, THREE [facility] ADARSHI Juanjose Yuniel PCW   8/16/2024  8:30 AM PROPEL-IT HEALTHCOACH, THREE [facility] ADARSHI Juanjose Yuniel PCW   9/20/2024  8:30 AM PROPEL-IT HEALTHCOACH, THREE [facility] MARIBEL Sow Yuniel PCW   10/18/2024  8:30 AM PROPEL-IT HEALTHCOACH, THREE [facility] ADARSHI Juanojse Margueriteannie PCW   11/15/2024  8:30 AM PROPEL-IT HEALTHCOACH, THREE [facility] MARIBEL Sow  Margueritey PCW   12/20/2024  8:30 AM PROPEL-IT HEALTHCOACH, THREE [facility] PROWEI Juanjose Everetty PCW   1/17/2025  8:30 AM PROPEL-IT HEALTHCOACH, THREE [facility] PROWEI Juanjose Brice PCW   2/21/2025  8:30 AM PROPEL-IT HEALTHCOACH, THREE [facility] PROWEI Juanjose Margueritey PCW   3/21/2025  8:30 AM PROPEL-IT HEALTHCOACH, THREE [facility] PROWEI Juanjose Brice PCW   5/16/2025  8:00 AM PROPEL-IT HEALTHCOACH, THREE [facility] PROWEI Juanjose Everetty PCW   6/20/2025  8:00 AM PROPEL-IT HEALTHCOACH, THREE [facility] PROWEI Juanjose Brice PCW   7/18/2025  8:00 AM PROPEL-IT HEALTHCOACH, THREE [facility] PROWEI Juanjose Margueriteannie PCW   8/15/2025  8:00 AM PROPEL-IT HEALTHCOACH, THREE [facility] PROWEI Juanjose Brice PCW   9/19/2025  8:00 AM PROPEL-IT HEALTHCOACH, THREE [facility] PROWEI Juanjose Margueritey PCW   10/17/2025  8:00 AM PROPEL-IT HEALTHCOACH, THREE [facility] PROWEI Juanjose Brice PCW   11/21/2025  8:00 AM PROPEL-IT HEALTHCOACH, THREE [facility] PROWEI Juanjose Margueritey PCW

## 2023-12-15 ENCOUNTER — PATIENT OUTREACH (OUTPATIENT)
Dept: RESEARCH | Facility: CLINIC | Age: 58
End: 2023-12-15
Payer: COMMERCIAL

## 2023-12-15 DIAGNOSIS — Z00.6 RESEARCH SUBJECT: ICD-10-CM

## 2023-12-15 LAB
BILIRUB UR QL STRIP: NEGATIVE
CLARITY UR REFRACT.AUTO: CLEAR
COLOR UR AUTO: COLORLESS
GLUCOSE UR QL STRIP: NEGATIVE
HGB UR QL STRIP: NEGATIVE
KETONES UR QL STRIP: NEGATIVE
LEUKOCYTE ESTERASE UR QL STRIP: NEGATIVE
NITRITE UR QL STRIP: NEGATIVE
PH UR STRIP: 6 [PH] (ref 5–8)
PROT UR QL STRIP: NEGATIVE
SP GR UR STRIP: 1 (ref 1–1.03)
URN SPEC COLLECT METH UR: ABNORMAL

## 2023-12-15 NOTE — PROGRESS NOTES
12/15/2023  9:49 AM    Patient Name Carol Collins   Appointment Department [facility] WEIGHT MANAGEMENT  Visit Type Audio (Virtual visit)    Clinic Weights   Wt Readings from Last 3 Encounters:   12/14/23 1446 108.2 kg (238 lb 8.6 oz)   10/26/23 1310 108.8 kg (239 lb 13.8 oz)   09/20/23 0934 108.4 kg (238 lb 15.7 oz)     Last Reported Weights No data was found      [facility] INTERVENTION CONTACT:   Method of contact:  Phone Call   or Participant-Initiated Contact?:  -initiated contact  Type of intervention Contact:  Scheduled Session  Did the participant attend session?: Yes    Was the patient called within 15 minutes of the scheduled appointment?:  Yes  Is this a make-up session?: No    Which session materials covered in session?:  Session 1  Patient Reported Weight (From External Aria):  233  Time workout: not specified.  Average Daily Steps: not specified.  Calorie Prescription::  1600  Safety Criteria Triggered:  None  Toolbox Triggered:  None  Weight Graph Stoplight Status for Dietary Adherence:  Green Light       Goals    None          Additional Notes:    Health  introduction call to patient. Patient expressed her overall motivation for joining the [facility] weight loss study is to lose weight for her son's wedding in April 2025. She has attempted to lose weight on her own in the past but has not been able to maintain. She was successful losing weight with reduced potion sizes, but slowly she regained what she lost (about 2004).     Patient is agreeable to the meal plan and does not have any major reservations about eating frozen entree meals. She lives by herself and only has to account for what she is eating in a week (at a time). She feels confident that she can follow this meal plan since she eats many of these things already & is easy to follow/reduced stress for her.     States that she only drinks coffee socially and is sensitive to caffeine (palpitations).     Explained that she has a  history of HTN but is now controlled without medication. Agrees to try to limit sodium to 2300 mg/day (recommended by DASH).     Does not drink juice, sweetened drinks, or other caloric beverages. States that she mostly drinks water.     Has started to make an effort to move more. She is counting steps with her iPhone and plans to use an Apple Watch after [place]. Will stand up and walk around her home during [employer] basketball games.     Goals:   1) Grocery shopping for meal plan and portion controlled foods  2) Continue weighing daily, shortly after waking up    JOHANN OlmedoEd.  [facility] [employer] [facility]   [phone]

## 2023-12-22 ENCOUNTER — PATIENT OUTREACH (OUTPATIENT)
Dept: RESEARCH | Facility: CLINIC | Age: 58
End: 2023-12-22
Payer: COMMERCIAL

## 2023-12-22 NOTE — PROGRESS NOTES
12/22/2023  8:40 AM    Patient Name Carol Collins   Appointment Department [facility] HUE WEIGHT MANAGEMENT  Visit Type Audio (Virtual visit)    Clinic Weights   Wt Readings from Last 3 Encounters:   12/14/23 1446 108.2 kg (238 lb 8.6 oz)   10/26/23 1310 108.8 kg (239 lb 13.8 oz)   09/20/23 0934 108.4 kg (238 lb 15.7 oz)     Last Reported Weights No data was found      [facility] INTERVENTION CONTACT:   Method of contact:  Phone Call   or Participant-Initiated Contact?:  -initiated contact  Type of intervention Contact:  Scheduled Session  Did the participant attend session?: Yes    Was the patient called within 15 minutes of the scheduled appointment?:  Yes  Is this a make-up session?: No    Which session materials covered in session?:  Session 2  Patient Reported Weight (From External Aria):  229  Time workout: not specified.  Average Daily Steps: tracking on her phone - trending up for last 5 weeks - unsure exact number of steps/day.  Calorie Prescription::  1600  Safety Criteria Triggered:  None  Toolbox Triggered:  None  Weight Graph Stoplight Status for Dietary Adherence:  Green Light       Goals    None          Additional Notes:    Patient states that week 1 went well overall and she had several questions to address.   Patient states that since she is lactose intolerant she cannot have any frozen meals with cheese - she is estimating that she's eating closer to 1300 calories/day but feels okay doing this so far. Complained of some headaches and hunger earlier in the week but states today that she feels much better and even more energy overall.     Discussed weight graph, toolbox, and stoplight. Patient verbalizes understanding.     Encouraged patient to try dairy free alternatives for yogurt since she enjoys eating yogurt but cannot tolerate the milk.     Discussed using MyFitnessPal (MFP) for calorie tracking and estimation.    Discussed healthy holiday eating since we will not meet again until after  the New Year.     Goals:   1) Drink (at least) 8 oz of water before and after meals   2) Consider eating everything you want in 1/2 sized portion amounts  (If you need to brush your teeth after eating sweets that may help deter you from eating more or going back for another serving)  3) Continue to weigh daily for accountability    Arianne Novoa M.Ed.  [facility]   [phone]

## 2023-12-28 ENCOUNTER — PATIENT MESSAGE (OUTPATIENT)
Dept: RESEARCH | Facility: CLINIC | Age: 58
End: 2023-12-28
Payer: COMMERCIAL

## 2024-01-12 ENCOUNTER — PATIENT OUTREACH (OUTPATIENT)
Dept: RESEARCH | Facility: CLINIC | Age: 59
End: 2024-01-12
Payer: COMMERCIAL

## 2024-01-12 NOTE — PROGRESS NOTES
01/12/2024  8:23 AM    Patient Name Carol Collins   Appointment Department [facility] PROP WEIGHT MANAGEMENT  Visit Type Audio (Virtual visit)    Clinic Weights   Wt Readings from Last 3 Encounters:   12/14/23 1446 108.2 kg (238 lb 8.6 oz)   10/26/23 1310 108.8 kg (239 lb 13.8 oz)   09/20/23 0934 108.4 kg (238 lb 15.7 oz)     Last Reported Weights No data was found      [facility] INTERVENTION CONTACT:   Method of contact:  Phone Call   or Participant-Initiated Contact?:  -initiated contact  Type of intervention Contact:  Scheduled Session  Did the participant attend session?: Yes    Was the patient called within 15 minutes of the scheduled appointment?:  Yes  Is this a make-up session?: No    Which session materials covered in session?:  Session 3 and Session 4  Patient Reported Weight (From External Aria):  224  Time workout: not reported.  Average daily steps per day via Fitbit or activity tracker measurement::  5000  Calorie Prescription::  1600  Safety Criteria Triggered:  None  Toolbox Triggered:  None  Weight Graph Stoplight Status for Dietary Adherence:  Green Light       Goals    None          Additional Notes:    Patient reports Doing Well. Patient is Highly Motivated with being 224 pounds today and aims to Get Below that weight next week. Patient's plans for eating this week include Buying pre-packaged meals. For exercise, patient plans to increase her daily steps from 5,000 steps to 6,000 steps.    Patient is very pleased with her progress so far with the program and is thrilled with her weight loss. She attributes her progress to following the meal plan and eating the portion controlled meals. She is also more active than previously. Using her new apple watch to count steps.     Goal:   1) Increase daily steps from 5,000 to 6,000      Arianne Novoa M.Ed.  [facility]   [phone]

## 2024-01-19 ENCOUNTER — PATIENT OUTREACH (OUTPATIENT)
Dept: RESEARCH | Facility: CLINIC | Age: 59
End: 2024-01-19
Payer: COMMERCIAL

## 2024-01-19 NOTE — PROGRESS NOTES
[facility] UROGYNECOLOGY  [address] [address]    Carol Collins  [id number]  1965  2024    Consulting Physician: Lore Marcelo PA-C     Primary M.D.: Chiquis Messina MD    Chief Complaint   Patient presents with    Urinary Incontinence    Urinary Frequency    Urinary Urgency       HPI:     1)  UI:  (+) HELEN (years) > (+) UUI  X  several years.  (+) pads--sometimes lines with toilet paper :1/day, usually moderate wetness   Daytime frequency: Q 1 hours.  Nocturia: Yes: 2-3/night. Limits fluids prior to bedtime  (--) dysuria,  (--) hematuria,  (--) frequent UTIs.  (+) complete bladder emptying. Started oxybutynin x 2-3 weeks-- has improved some  Drinks 50+ ounces water  Naked smoothie  Decaf coffee or tea    2)  POP:  Absent. Symptoms:(--)  .  (--) vaginal bleeding. (--) vaginal discharge. (+) sexually active.  (+) dyspareunia--initially penetration. Uses astroglide  (+)  Vaginal dryness.  (--) vaginal estrogen use.     3)  BM:  (+) constipation/straining--intermittent.  (--) chronic diarrhea. (--) hematochezia.  (--) fecal incontinence.  (+) fecal smearing/urgency--rare.  (--) complete evacuation.  Takes probiotics    Past Medical History  Past Medical History:   Diagnosis Date    GERD (gastroesophageal reflux disease)     Heart palpitations 2018        Past Surgical History  Past Surgical History:   Procedure Laterality Date    BREAST BIOPSY Left      SECTION, CLASSIC      HYSTERECTOMY      fibroids, ovaries remain    lumpectomy Right     benign        Hysterectomy: Yes - Date: .  Indication: fibroids.    Type: xlap  Cervix present: No  Ovaries present: Yes -   Other procedures at time of hysterectomy:  n/w    Past Ob History     x 1.  C/s x 1.    Largest infant weight: 8# 7oz  no FAVD. yes episiotomy.      Gynecologic History  LMP: No LMP recorded. Patient has had a hysterectomy.  Age of menarche: 15  Age of  menopause: 49  Menstrual history: metrorrhagia  Pap test:post hysterectomy 04/2018 normal HPV negative.  History of abnormal paps: No.  History of STIs:  Yes - trich years ago  Mammogram: Date of last: 09/2023.  Result: Normal  Colonoscopy: Date of last: 2014.  Result:  normal per patient report.  Repeat due:  due.    DEXA: n/a    Family History  Family History   Problem Relation Age of Onset    Hypertension Father     Hypertension Mother     Diabetes Mother     Ovarian cancer Mother 73        type? survived 3m after dx    Diabetes Brother     Vulvar Cancer Sister         s/p XRT    Breast cancer Sister 60        uni/bilat? s/p double mast; s/p chemo    Hypertension Sister     Breast cancer Sister 52        unilat? suspected s/p chemo    Hypertension Sister     Diabetes Sister     Hypertension Sister     No Known Problems Son     No Known Problems Son     Cancer Cousin         origin? abdominal?    Cancer Cousin         origin? lung?    Cancer Cousin         suspected; origin?    Breast cancer Other     Stomach cancer Neg Hx     Colon cancer Neg Hx     Esophageal cancer Neg Hx       Colon CA: No  Breast CA: Yes - 2 sisters  GYN CA: Yes - mother ovarian cancer   CA: No    Social History  Social History     Tobacco Use   Smoking Status Never   Smokeless Tobacco Never     Social History     Substance and Sexual Activity   Alcohol Use Yes    Comment: rarely   .    Social History     Substance and Sexual Activity   Drug Use No   .    Allergies  Review of patient's allergies indicates:   Allergen Reactions    Grass pollen-june grass standard     House dust Other (See Comments)       Medications  Current Outpatient Medications on File Prior to Visit   Medication Sig Dispense Refill    oxybutynin (DITROPAN-XL) 5 MG TR24 Take 1 tablet (5 mg total) by mouth once daily. 30 tablet 2    vitamin D (VITAMIN D3) 1000 units Tab Take 1,000 Units by mouth once daily.       No current facility-administered medications on file prior  "to visit.       Review of Systems A 14 point ROS was reviewed with pertinent positives as noted above in the history of present illness.      Constitutional: negative  Eyes: negative  Endocrine: negative  Gastrointestinal: negative  Cardiovascular: negative  Respiratory: negative  Allergic/Immunologic: negative  Integumentary: negative  Psychiatric: negative  Musculoskeletal: negative   Ear/Nose/Throat: negative  Neurologic: negative  Genitourinary: SEE HPI  Hematologic/Lymphatic: negative   Breast: negative    Urogynecologic Exam  /73 (BP Location: Right arm, Patient Position: Sitting, BP Method: Large (Automatic))   Pulse (!) 58   Ht 5' 5" (1.651 m)   Wt 103.5 kg (228 lb 2.8 oz)   BMI 37.97 kg/m²     GENERAL APPEARANCE:  The patient is well-developed, well-nourished.   Neck:  Supple with no thyromegaly, no carotid bruits.  Heart:  Regular rate and rhythm, no murmurs, rubs or gallops.  Lungs:  Clear.  No CVA tenderness.  Abdomen:  Soft, nontender, nondistended, no hepatosplenomegaly.  Incisions:  pfannensteil    PELVIC:    External genitalia:  Normal Bartholins, Skenes and labia bilaterally.    Urethra:  No caruncle, diverticulum or masses.  (+) hypermobility.    Vagina:  Atrophy (+) , no bladder masses or tender, no discharge.    Cervix:  absent  Uterus: uterus absent  Adnexa: Not palpable.    POP-Q:  Aa -2; Ba -2; C -8; Ap -2; Bp -2; D n/a.  Genital hiatus 3, perineal body 2 total vaginal length 9.      NEUROLOGIC:  Cranial nerves 2 through 12 intact.  Strength 5/5.  DTRs 2+ lower extremities.  S2 through 4 normal.  Sacral reflexes intact.    EXT: WOMACK, 2+ pulses bilaterally, no C/C/E    COUGH STRESS TEST:  negative  KEGEL: 1 /5    RECTAL:    External:  Normal, (--) hemorrhoids, (--) dovetailing.   Internal: deferred    PVR: 20 mL    Impression    1. Mixed stress and urge urinary incontinence    2. Urinary urgency    3. Urinary frequency    4. Constipation, unspecified constipation type        Initial " Plan  The patient was counseled regarding these issues. The patient was given a summary sheet containing each of these issues with possible options for evaluation and management. When appropriate, we also reviewed computer-generated diagrams specific to their diagnoses..  All questions were addressed to the patient's satisfaction.     1)  Mixed urinary incontinence, urge > stress:    --Empty bladder every 3 hours.  Empty well: wait a minute, lean forward on toilet.    --Avoid dietary irritants (see sheet).  Keep diary x 3-5 days to determine your irritants.  --KEGELS: do 10 in AM and 10 in PM, holding each x 10 seconds.  When you feel urge to go, STOP, KEGEL, and when urge has passed, then go to bathroom.  Consider PT in future.    --URGE: continue oxybutynin xl 5 mg until you get gemtesa if affordable  -- trial gemtesa 75 mg daily--let me know if it is not affordable.  Takes 2-4 weeks to see if will have effect.  For dry mouth: get sour, sugar free lozenge or gum.    --STRESS:  Pessary vs. Sling.    2.  Constipation:  --hydrate well  --Start daily fiber.  Take 1 tsp of fiber powder (psyllium or other sugar-free powder).  Mix in 8 oz of water.  Take x 3-5 days.  Then, increase fiber by 1 tsp every 3-5 days until stool is easy to pass.  Stop and continue at that dose.   Do not exceed 6 tsps/day.  May also use over the counter stool softener 1-2 x/day.  AVOID laxatives.    3. Start pelvic floor PT  [facility] (all take Medicaid):  1)  TANGELA [facility]: (p) [phone]. (f) [phone]. Established patients call:  [phone].  2)  TANGELA [facility]: (p) [phone]  . (f) [phone].    3)  TNAGELA Gonzales: (p) [phone].  Or [phone].   4)  TANGELA Chin. (p) [phone].    5)  TANGEAL Hoff: (p) [phone].      [place]:  ([facility]: [address], [place], [place] [address]).  (p)  [phone].  (f) [phone]      Kristie Du ([facility]): (p)  [phone].  (f) [phone].      Dina Romero.  [facility].  [address] [phone]  [place] [address].  (p) [phone].  (f) [phone].    Carol Ayala ([facility] physical therapy:  [address] [facility]):  (p) [phone]. (f) [phone].     Basilio Jane, Klaudia Gibbs, Lauren Shepley ([place]). (p) [phone]  (f) [phone].    Klaudia Gibbs, PT, DPT or Kathia Kimble, PT, DPT ([facility]: [address] [place] [address]): (p) [phone].  (f) [phone].    [facility]  Niki Barney ([address], [address]): (p) [phone].  (f) [phone].    Sindy Paul ([facility]: [address], [place] [address]).  Phone: [phone].  Fax:  [phone].      4. RTC 4 months for follow          I spent a total of 30 minutes on the day of the visit.  This includes face to face time and non-face to face time preparing to see the patient (eg, review of tests), obtaining and/or reviewing separately obtained history, documenting clinical information in the electronic or other health record, independently interpreting results and communicating results to the patient/family/caregiver, or care coordinator.       Thank you for requesting consultation of your patient.  I look forward to participating in their care.    Maisha Stratton  Female Pelvic Medicine and Reconstructive Surgery  [facility] [place], [place]

## 2024-01-19 NOTE — PROGRESS NOTES
"01/19/2024  8:54 AM    Patient Name Carol Collins   Appointment Department [facility] PROP WEIGHT MANAGEMENT  Visit Type Audio (Virtual visit)    Clinic Weights   Wt Readings from Last 3 Encounters:   12/14/23 1446 108.2 kg (238 lb 8.6 oz)   10/26/23 1310 108.8 kg (239 lb 13.8 oz)   09/20/23 0934 108.4 kg (238 lb 15.7 oz)     Last Reported Weights No data was found      [facility] INTERVENTION CONTACT:   Method of contact:  Phone Call   or Participant-Initiated Contact?:  -initiated contact  Type of intervention Contact:  Scheduled Session  Did the participant attend session?: Yes    Was the patient called within 15 minutes of the scheduled appointment?:  Yes  Is this a make-up session?: No    Which session materials covered in session?:  Session 5  Patient Reported Weight (From External Aria):  224  Time workout: not specified.  Average Daily Steps: 2789-3622.  Calorie Prescription::  1600  Safety Criteria Triggered:  None  Toolbox Triggered:  None  Weight Graph Stoplight Status for Dietary Adherence:  Green Light       Goals    None          Additional Notes:    Patient reports Doing Well. Patient is Highly Motivated with being 224 pounds today and aims to Get Below that weight next week. Patient's plans for eating this week include eating pre-portioned meals and cooking more vegetables. For exercise, patient plans to increase her minimum step goal to 5000 steps/day.    Patient asked about the clarification of eating "processed foods" in the session material (and eating processed frozen meals). Discussed adding in more fresh foods to her diet.     Patient expressed interested in learning how to cook and prepare tofu. Sent directions via [employer].     Goals:   1) Step goal daily: 5,000  2) Eating more vegetables prepared at home    Arianne Novoa M.Ed.  [facility]   [phone]        "

## 2024-01-22 ENCOUNTER — OFFICE VISIT (OUTPATIENT)
Dept: UROGYNECOLOGY | Facility: CLINIC | Age: 59
End: 2024-01-22
Payer: COMMERCIAL

## 2024-01-22 VITALS
HEART RATE: 58 BPM | SYSTOLIC BLOOD PRESSURE: 129 MMHG | HEIGHT: 65 IN | DIASTOLIC BLOOD PRESSURE: 73 MMHG | BODY MASS INDEX: 38.02 KG/M2 | WEIGHT: 228.19 LBS

## 2024-01-22 DIAGNOSIS — R35.0 URINARY FREQUENCY: ICD-10-CM

## 2024-01-22 DIAGNOSIS — K59.00 CONSTIPATION, UNSPECIFIED CONSTIPATION TYPE: ICD-10-CM

## 2024-01-22 DIAGNOSIS — R39.15 URINARY URGENCY: ICD-10-CM

## 2024-01-22 DIAGNOSIS — N39.46 MIXED STRESS AND URGE URINARY INCONTINENCE: Primary | ICD-10-CM

## 2024-01-22 PROCEDURE — 99214 OFFICE O/P EST MOD 30 MIN: CPT | Mod: 25,S$GLB,, | Performed by: NURSE PRACTITIONER

## 2024-01-22 PROCEDURE — 3008F BODY MASS INDEX DOCD: CPT | Mod: CPTII,S$GLB,, | Performed by: NURSE PRACTITIONER

## 2024-01-22 PROCEDURE — 3074F SYST BP LT 130 MM HG: CPT | Mod: CPTII,S$GLB,, | Performed by: NURSE PRACTITIONER

## 2024-01-22 PROCEDURE — 51701 INSERT BLADDER CATHETER: CPT | Mod: S$GLB,,, | Performed by: NURSE PRACTITIONER

## 2024-01-22 PROCEDURE — 99999 PR PBB SHADOW E&M-EST. PATIENT-LVL IV: CPT | Mod: PBBFAC,,, | Performed by: NURSE PRACTITIONER

## 2024-01-22 PROCEDURE — 1160F RVW MEDS BY RX/DR IN RCRD: CPT | Mod: CPTII,S$GLB,, | Performed by: NURSE PRACTITIONER

## 2024-01-22 PROCEDURE — 3078F DIAST BP <80 MM HG: CPT | Mod: CPTII,S$GLB,, | Performed by: NURSE PRACTITIONER

## 2024-01-22 PROCEDURE — 87086 URINE CULTURE/COLONY COUNT: CPT | Performed by: NURSE PRACTITIONER

## 2024-01-22 PROCEDURE — 1159F MED LIST DOCD IN RCRD: CPT | Mod: CPTII,S$GLB,, | Performed by: NURSE PRACTITIONER

## 2024-01-22 RX ORDER — VIBEGRON 75 MG/1
75 TABLET, FILM COATED ORAL DAILY
Qty: 30 TABLET | Refills: 11 | Status: SHIPPED | OUTPATIENT
Start: 2024-01-22 | End: 2024-03-04

## 2024-01-22 NOTE — PATIENT INSTRUCTIONS
1)  Mixed urinary incontinence, urge > stress:    --Empty bladder every 3 hours.  Empty well: wait a minute, lean forward on toilet.    --Avoid dietary irritants (see sheet).  Keep diary x 3-5 days to determine your irritants.  --KEGELS: do 10 in AM and 10 in PM, holding each x 10 seconds.  When you feel urge to go, STOP, KEGEL, and when urge has passed, then go to bathroom.  Consider PT in future.    --URGE: continue oxybutynin xl 5 mg until you get gemtesa if affordable  -- trial gemtesa 75 mg daily--let me know if it is not affordable.  Takes 2-4 weeks to see if will have effect.  For dry mouth: get sour, sugar free lozenge or gum.    --STRESS:  Pessary vs. Sling.    2.  Constipation:  --hydrate well  --Start daily fiber.  Take 1 tsp of fiber powder (psyllium or other sugar-free powder).  Mix in 8 oz of water.  Take x 3-5 days.  Then, increase fiber by 1 tsp every 3-5 days until stool is easy to pass.  Stop and continue at that dose.   Do not exceed 6 tsps/day.  May also use over the counter stool softener 1-2 x/day.  AVOID laxatives.    3. Start pelvic floor PT  [facility] (all take Medicaid):  1)  TANGELA [facility]: (p) [phone]. (f) [phone]. Established patients call:  [phone].  2)  TANGELA RIBERA [place]: (p) [phone]  . (f) [phone].    3)  TANGELA Gonzales: (p) [phone].  Or [phone].   4)  TANGELA Chin. (p) [phone].    5)  TANGELA Hoff: (p) [phone].      [place]:  ([facility]: [address], [place], [place] [address]).  (p)  [phone].  (f) [phone]      Kristie Du ([place]): (p) [phone].  (f) [phone].      Jabarival Romero.  [facility].  [address] [phone]  [place] [address].  (p) [phone].  (f) [phone].    Carol Ayala ([facility] physical therapy:  [address]):  (p) [phone]. (f) [phone].     Basilio Jane, Klaudia Gibbs, Lauren Shepley ([facility]). (p)  [phone]  (f) [phone].    Klaudia Gibbs, PT, DPT or Kathia Kimble, PT, DPT ([facility]: [address] [address]): (p) [phone].  (f) [phone].    [facility]  Niki Corley ([address], [address]): (p) [phone].  (f) [phone].    Sindy Paul ([facility]: [address], [place] [address]).  Phone: [phone].  Fax:  [phone].          4. RTC 4 months for follow      Bladder Irritants  Certain foods and drinks have been associated with worsening symptoms of urinary frequency, urgency, urge incontinence, or  bladder pain. If you suffer from any of these conditions, you may wish to try eliminating one or more of these foods from your  diet and see if your symptoms improve. If bladder symptoms are related to dietary factors, strict adherence to a diet that  eliminates the food should bring marked relief in 10 days. Once you are feeling better, you can begin to add foods back into  your diet, one at a time. If symptoms return, you will be able to identify the irritant. As you add foods back to your diet it is  very important that you drink significant amounts of water.  Low-acid fruit substitutions include apricots, papaya, pears and watermelon. Coffee drinkers can drink Kava or other lowacid  instant drinks. Tea drinkers can substitute non-citrus herbal and sun brewed teas. Calcium carbonate co-buffered with  calcium ascorbate can be substituted for Vitamin C. Prelief is a dietary supplement that works as an acid blocker for the  bladder.  Where to get more information:   Overcoming Bladder Disorders by Jinny Sands and Richie Farnsworth, 1990   You Dont Have to Live with Cystitis! By Rocío Morales, 1988  List of Common Bladder Irritants*  Alcoholic beverages  Apples and apple juice  Cantaloupe  Carbonated beverages  Chili and spicy foods  Chocolate  Citrus fruit  Coffee (including  decaffeinated)  Cranberries and cranberry juice  Grapes  Guava  Milk Products: milk, cheese, cottage cheese, yogurt, ice cream  Peaches  Pineapple  Plums  Strawberries  Sugar especially artificial sweeteners, saccharin, aspartame, corn sweeteners, honey, fructose, sucrose, lactose  Tea  Tomatoes and tomato juice  Vitamin B complex  Vinegar  *Most people are not sensitive to ALL of these products; your goal is to find the foods that make YOUR  symptoms worse

## 2024-01-23 LAB — BACTERIA UR CULT: NO GROWTH

## 2024-01-26 ENCOUNTER — PATIENT OUTREACH (OUTPATIENT)
Dept: RESEARCH | Facility: CLINIC | Age: 59
End: 2024-01-26
Payer: COMMERCIAL

## 2024-01-28 ENCOUNTER — PATIENT MESSAGE (OUTPATIENT)
Dept: INTERNAL MEDICINE | Facility: CLINIC | Age: 59
End: 2024-01-28
Payer: COMMERCIAL

## 2024-01-28 DIAGNOSIS — Z12.11 ENCOUNTER FOR SCREENING COLONOSCOPY: Primary | ICD-10-CM

## 2024-01-28 DIAGNOSIS — Z00.00 ENCOUNTER FOR PREVENTIVE HEALTH EXAMINATION: ICD-10-CM

## 2024-01-28 DIAGNOSIS — Z12.31 SCREENING MAMMOGRAM, ENCOUNTER FOR: ICD-10-CM

## 2024-02-02 ENCOUNTER — PATIENT OUTREACH (OUTPATIENT)
Dept: RESEARCH | Facility: CLINIC | Age: 59
End: 2024-02-02
Payer: COMMERCIAL

## 2024-02-02 NOTE — PROGRESS NOTES
"02/02/2024  8:38 AM    Patient Name Carol Collins   Appointment Department [facility] HUE WEIGHT MANAGEMENT  Visit Type Audio (Virtual visit)    Clinic Weights   Wt Readings from Last 3 Encounters:   01/22/24 1021 103.5 kg (228 lb 2.8 oz)   12/14/23 1446 108.2 kg (238 lb 8.6 oz)   10/26/23 1310 108.8 kg (239 lb 13.8 oz)     Last Reported Weights No data was found      [facility] INTERVENTION CONTACT:   Method of contact:  Phone Call   or Participant-Initiated Contact?:  -initiated contact  Type of intervention Contact:  Scheduled Session  Did the participant attend session?: Yes    Was the patient called within 15 minutes of the scheduled appointment?:  Yes  Is this a make-up session?: No    Which session materials covered in session?:  Session 7  Patient Reported Weight (From External Aira):  222  Time workout: not specified.  Average daily steps per day via Fitbit or activity tracker measurement::  7500  Calorie Prescription::  1600  Safety Criteria Triggered:  None  Toolbox Triggered:  None  Weight Graph Stoplight Status for Dietary Adherence:  Green Light       Goals    None          Additional Notes:    Patient reports Doing Well. Patient is Highly Motivated with being 222 pounds today and aims to Get Below that weight next week. Patient's plans for eating this week include Buying pre-packaged meals. For exercise, patient plans to increase her step count to 7,000 steps/day.    Patient reports having some mild constipation and is taking metamucil. States that this has helped her after a few days. She agrees to increase her water intake from 3 bottles/day to 4 bottles/day. Doing well with her step goal - she exceeded her 6,000 steps goal last week and agrees to increase to 7,000 steps/day this week.     Discussed eating 1/2 plate in vegetables. Patient asked about "good vs great" vegetables and advised her that any vegetables she likes can be great vegetables. Patient enjoys eating frozen entree meals and " intends to continue eating them as it has helped promote her weight loss.    Goals:   1) 7,000 steps/day  2) 4 bottles of water/day    Arianne Novoa M.Ed.  [facility]   [phone]

## 2024-02-09 ENCOUNTER — PATIENT OUTREACH (OUTPATIENT)
Dept: RESEARCH | Facility: CLINIC | Age: 59
End: 2024-02-09
Payer: COMMERCIAL

## 2024-02-09 NOTE — PROGRESS NOTES
02/09/2024  8:02 AM    Patient Name Carol Collins   Appointment Department [facility] HUE WEIGHT MANAGEMENT  Visit Type Audio (Virtual visit)    Clinic Weights   Wt Readings from Last 3 Encounters:   01/22/24 1021 103.5 kg (228 lb 2.8 oz)   12/14/23 1446 108.2 kg (238 lb 8.6 oz)   10/26/23 1310 108.8 kg (239 lb 13.8 oz)     Last Reported Weights No data was found      [facility] INTERVENTION CONTACT:   Method of contact:  Phone Call   or Participant-Initiated Contact?:  -initiated contact  Type of intervention Contact:  Scheduled Session  Did the participant attend session?: Yes    Was the patient called within 15 minutes of the scheduled appointment?:  Yes  Is this a make-up session?: No    Which session materials covered in session?:  Session 8  Patient Reported Weight (From External Arai):  222  Patient-reported weekly minutes of physical activity::  150  Average daily steps per day via Fitbit or activity tracker measurement::  7000  Calorie Prescription::  1600  Safety Criteria Triggered:  None  Toolbox Triggered:  None  Weight Graph Stoplight Status for Dietary Adherence:  Green Light       Goals    None          Additional Notes:      Patient reports Doing Well. Patient is Highly Motivated with being 222 pounds today and aims to Get Below that weight next week. Patient's plans for eating this week include Buying pre-packaged meals. For exercise, patient plans to increase her step goal to 8,000 steps/day.    Patient reflects on her progress so far and is very proud of her accomplishments of meeting her step goal, water goal, and overall weight loss of -13 lbs. She is now only 10 lbs away from her 10% loss goal.     She attributes her success to following the meal plan, eating single portion controlled meals, walking, and drinking more water. She explained that her motivation for exercising and walking more is how good she feels, overall ease of movement, and improved energy levels. She also notes that her  clothes are fitting looser and she is ever at the stage where she needs new pants.    She described feeling some hesitancy with sharing her success with some friends recently since she does not want to make anyone else feel uncomfortable.     Goals:   1) 8,000 steps/day  2) 5 bottles of water/day    Arianne Novoa M.Ed.  [facility]   [phone]

## 2024-02-16 ENCOUNTER — PATIENT OUTREACH (OUTPATIENT)
Dept: RESEARCH | Facility: CLINIC | Age: 59
End: 2024-02-16
Payer: COMMERCIAL

## 2024-02-16 NOTE — PROGRESS NOTES
"02/16/2024  9:24 AM    Patient Name Carol Collins   Appointment Department [facility] HUE WEIGHT MANAGEMENT  Visit Type Audio (Virtual visit)    Clinic Weights   Wt Readings from Last 3 Encounters:   01/22/24 1021 103.5 kg (228 lb 2.8 oz)   12/14/23 1446 108.2 kg (238 lb 8.6 oz)   10/26/23 1310 108.8 kg (239 lb 13.8 oz)     Last Reported Weights No data was found      [facility] INTERVENTION CONTACT:   Method of contact:  Phone Call   or Participant-Initiated Contact?:  -initiated contact  Type of intervention Contact:  Scheduled Session  Did the participant attend session?: Yes    Was the patient called within 15 minutes of the scheduled appointment?:  Yes  Is this a make-up session?: No    Which session materials covered in session?:  Session 9  Patient Reported Weight (From External Aria):  221  Time workout: not specified.  Average daily steps per day via Fitbit or activity tracker measurement::  8000  Calorie Prescription::  1600  Safety Criteria Triggered:  None  Toolbox Triggered:  None  Weight Graph Stoplight Status for Dietary Adherence:  Green Light       Goals    None          Additional Notes:    Patient reports Doing Well. Patient is Highly Motivated with being 221 pounds today and aims to Get Below that weight next week. Patient's plans for eating this week include Declining unhealthy options. For exercise, patient plans to increase her daily step goal to 9,000 steps/day.    Patient states that she is pleased with her progress so far and even ate some "fun foods" during her tailgating party for the Super Bowl. She noted that she even sent the leftover foods home with her son so she would not be tempted to eat them at her house. She is also very pleased with making her step goal every day.     She did express concerns over her digestion, stating that she may go 2 days in a row without a BM. She is tracking this now incase it worsens and needs to follow up with a doctor. Still taking metamucil " daily - drinking hot tea every morning.    Goals:  1) 9,000 steps/day  2) 5 bottles of water/day    Arianne Novoa M.Ed.  [facility]   [phone]

## 2024-02-21 ENCOUNTER — NURSE TRIAGE (OUTPATIENT)
Dept: ADMINISTRATIVE | Facility: CLINIC | Age: 59
End: 2024-02-21
Payer: COMMERCIAL

## 2024-02-21 ENCOUNTER — TELEPHONE (OUTPATIENT)
Dept: OPHTHALMOLOGY | Facility: CLINIC | Age: 59
End: 2024-02-21
Payer: COMMERCIAL

## 2024-02-21 NOTE — TELEPHONE ENCOUNTER
Patient is calling with Eye concerns transferred from patient access. States she has itching on lower lid, only one eye. She was getting a hair cut and some hair fell into my eye, but she never saw it on the tissue when she tried to wipe it out. She does not feel like something is in her eye all the time, it comes and goes. She states it goes away when she uses eye wash but then comes back. Triage done- see in office today. States she did make appointment with eye provider but is it not until June. Wondering if it could be an allergy in only one eye, advised it may. She has not tried any allergy medication, she may try that. Advise I would send message to provider but if they could not see her, UC or possible VV. Verb understanding.   Reason for Disposition   Minor foreign body (FB) in the eye (e.g., eyelash, dirt, sand)   Patient wants to be seen    Additional Information   Negative: Followed an eye injury   Negative: Eye pain from chemical in the eye   Negative: Has sinus pain or pressure   Negative: Foreign body is a chemical   Negative: Eye pain from foreign body in eye   Negative: SEVERE eye pain   Negative: Complete loss of vision in one or both eyes   Negative: Eyelids are very swollen (shut or almost) and fever   Negative: Eyelid (outer) is very red and fever   Negative: Foreign body sensation ('feels like something is in there') and irrigation didn't help   Negative: Vomiting   Negative: Ulcer or sore seen on the cornea (clear center part of the eye)   Negative: Recent eye surgery and increasing eye pain   Negative: Blurred vision and new or worsening   Negative: Patient sounds very sick or weak to the triager   Negative: MODERATE eye pain or discomfort (e.g., interferes with normal activities or awakens from sleep; more than mild)   Negative: Looking at light causes MODERATE to SEVERE eye pain (i.e., photophobia)   Negative: Eyelids are very swollen (shut or almost) and no fever   Negative: Painful rash  [FreeTextEntry1] : 48F SLE since childhood. 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 We previously discussed her CKD progression and answered questions. She is listed at [facility] and has a donor. Tx coordinator Rabia Rasmussentrisha [phone] [email or web address]. Not clinically uremic at this time. Has not heard from [facility] and will likely change her transplant program to [facility].  She has called [facility] and left a message but has not heard back. Has some concern about the availability of her donor.  On bicarbonate/ARB/Statin.  No need for activated vitamin D at this time, last PTH 61.\par 3)SLE - Inactive, currently on MMF.\par 4)healthy life style - Encouraged a healthy exercise regimen and diet.\par 5)HM already received prevnar will give flu shot.  Given left triceps.\par 6)on Long term disability - completed and sent over the paper work\par 7) Anemia - Procrit 10K q 3 weeks.  Check labs today to see if it needs to be adjusted.  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

## 2024-02-23 ENCOUNTER — PATIENT OUTREACH (OUTPATIENT)
Dept: RESEARCH | Facility: CLINIC | Age: 59
End: 2024-02-23
Payer: COMMERCIAL

## 2024-02-23 NOTE — PROGRESS NOTES
02/23/2024  8:02 AM    Patient Name Carol Collins   Appointment Department [facility] PROPLETICIA WEIGHT MANAGEMENT  Visit Type Audio (Virtual visit)    Clinic Weights   Wt Readings from Last 3 Encounters:   01/22/24 1021 103.5 kg (228 lb 2.8 oz)   12/14/23 1446 108.2 kg (238 lb 8.6 oz)   10/26/23 1310 108.8 kg (239 lb 13.8 oz)     Last Reported Weights No data was found      [facility] INTERVENTION CONTACT:   Method of contact:  Phone Call   or Participant-Initiated Contact?:  -initiated contact  Type of intervention Contact:  Scheduled Session  Did the participant attend session?: Yes    Was the patient called within 15 minutes of the scheduled appointment?:  Yes  Is this a make-up session?: No    Which session materials covered in session?:  Session 10  Patient Reported Weight (From External Aria):  220  Time workout: not reported.  Average daily steps per day via Fitbit or activity tracker measurement::  9000  Calorie Prescription::  1600  Safety Criteria Triggered:  None  Toolbox Triggered:  None  Weight Graph Stoplight Status for Dietary Adherence:  Green Light       Goals    None          Additional Notes:    Patient reports Doing Well. Patient is Highly Motivated with being 220 pounds today and aims to Get Below that weight next week. Patient's plans for eating this week include Creating a dining out plan. For exercise, patient plans to increase her daily step goal to 10,000 steps.    Patient is very pleased with making her 9,000 steps/day goal and plans to go for 10,000 steps/day this week. Discussed NEAT non-exercise activity thermogenesis since this is how she has achieved increasing her steps/day.     Patient has a friend from high school coming into town to visit her next week and she knows they will eat out at restaurants and do some tourist things together. Discussed making a plan to bring snacks in her purse and eat salads at restaurants when they go out to eat.     Discussed timing of meals and how  it's more important to do what works for you than to follow a rigid schedule of eating.     Goal:   1) 10,000 steps/day    JOHANN OlmedoEd.  [facility]   [phone]

## 2024-03-01 ENCOUNTER — PATIENT OUTREACH (OUTPATIENT)
Dept: RESEARCH | Facility: CLINIC | Age: 59
End: 2024-03-01
Payer: COMMERCIAL

## 2024-03-01 NOTE — PROGRESS NOTES
"03/01/2024  8:04 AM    Patient Name Carol Collins   Appointment Department [facility] HUE WEIGHT MANAGEMENT  Visit Type Audio (Virtual visit)    Clinic Weights   Wt Readings from Last 3 Encounters:   01/22/24 1021 103.5 kg (228 lb 2.8 oz)   12/14/23 1446 108.2 kg (238 lb 8.6 oz)   10/26/23 1310 108.8 kg (239 lb 13.8 oz)     Last Reported Weights No data was found      [facility] INTERVENTION CONTACT:   Method of contact:  Phone Call   or Participant-Initiated Contact?:  -initiated contact  Type of intervention Contact:  Scheduled Session  Did the participant attend session?: Yes    Was the patient called within 15 minutes of the scheduled appointment?:  Yes  Is this a make-up session?: No    Which session materials covered in session?:  Session 11  Patient Reported Weight (From External Aria):  218  Time workout: not specified.  Average daily steps per day via Fitbit or activity tracker measurement::  40935  Calorie Prescription::  1600  Safety Criteria Triggered:  None  Toolbox Triggered:  None  Weight Graph Stoplight Status for Dietary Adherence:  Green Light       Goals    None          Additional Notes:    Patient reports Doing Well. Patient is Highly Motivated with being 218 pounds today and aims to Get Below that weight next week. Patient's plans for eating this week include Buying pre-packaged meals. For exercise, patient plans to continue to walk 10,000 steps/day.    Discussed adding a variety of healthy choice entrees to her menu to avoid burnout of certain flavors.     Patient is very pleased with making over 10,000 steps/day and plans to maintain this goal for now as it is a stretch for her to achieve. She is ready to add abs and arms exercises to this routine during her TV time ([employer]).    Dicussed eating fast food as a "necessary evil" sometimes and healthier choices to eat when having fast food.     Goals:   1) 10,000 steps/day  2) Arm & Abs exercise during TV time    Arianne Novoa " [place].  [facility]   [phone]

## 2024-03-04 ENCOUNTER — OFFICE VISIT (OUTPATIENT)
Dept: INTERNAL MEDICINE | Facility: CLINIC | Age: 59
End: 2024-03-04
Payer: COMMERCIAL

## 2024-03-04 VITALS
SYSTOLIC BLOOD PRESSURE: 120 MMHG | HEIGHT: 65 IN | OXYGEN SATURATION: 98 % | DIASTOLIC BLOOD PRESSURE: 80 MMHG | WEIGHT: 218 LBS | BODY MASS INDEX: 36.32 KG/M2 | HEART RATE: 74 BPM

## 2024-03-04 DIAGNOSIS — E55.9 VITAMIN D DEFICIENCY: ICD-10-CM

## 2024-03-04 DIAGNOSIS — L98.9 SKIN LESION: ICD-10-CM

## 2024-03-04 DIAGNOSIS — Z00.00 HEALTH CARE MAINTENANCE: Primary | ICD-10-CM

## 2024-03-04 PROCEDURE — 1160F RVW MEDS BY RX/DR IN RCRD: CPT | Mod: CPTII,S$GLB,, | Performed by: PHYSICIAN ASSISTANT

## 2024-03-04 PROCEDURE — 1159F MED LIST DOCD IN RCRD: CPT | Mod: CPTII,S$GLB,, | Performed by: PHYSICIAN ASSISTANT

## 2024-03-04 PROCEDURE — 99999 PR PBB SHADOW E&M-EST. PATIENT-LVL IV: CPT | Mod: PBBFAC,,, | Performed by: PHYSICIAN ASSISTANT

## 2024-03-04 PROCEDURE — 3079F DIAST BP 80-89 MM HG: CPT | Mod: CPTII,S$GLB,, | Performed by: PHYSICIAN ASSISTANT

## 2024-03-04 PROCEDURE — 3074F SYST BP LT 130 MM HG: CPT | Mod: CPTII,S$GLB,, | Performed by: PHYSICIAN ASSISTANT

## 2024-03-04 PROCEDURE — 3008F BODY MASS INDEX DOCD: CPT | Mod: CPTII,S$GLB,, | Performed by: PHYSICIAN ASSISTANT

## 2024-03-04 PROCEDURE — 99214 OFFICE O/P EST MOD 30 MIN: CPT | Mod: S$GLB,,, | Performed by: PHYSICIAN ASSISTANT

## 2024-03-04 NOTE — PROGRESS NOTES
Subjective:       Patient ID: Carol Collins is a 58 y.o. female.        Chief Complaint: Follow-up    Carol Collins is an established patient of Chiquis Messina MD here today for follow up visit.    BMI down to 36 -   [employer] study  20# weight loss since 1/2024  Health  check ins weekly, goal setting, 10,000 steps/day, diet much healthier  Son getting  4/2024 so motivated to lose weight    Red bump left arm for years, now itching and bothersome, not growing or changing    Vitamin d def on vitamin d supplement in multivitamin, unsure dosage    Not using oxybutynin or gemtesa for overactive bladder, incontinence, concerned about side effects           Review of Systems   Constitutional:  Negative for chills, diaphoresis, fatigue and fever.   HENT:  Negative for congestion and sore throat.    Eyes:  Negative for visual disturbance.   Respiratory:  Negative for cough, chest tightness and shortness of breath.    Cardiovascular:  Negative for chest pain, palpitations and leg swelling.   Gastrointestinal:  Negative for abdominal pain, blood in stool, constipation, diarrhea, nausea and vomiting.   Genitourinary:  Negative for dysuria, frequency, hematuria and urgency.   Musculoskeletal:  Negative for arthralgias and back pain.   Skin:  Negative for rash.   Neurological:  Negative for dizziness, syncope, weakness and headaches.   Psychiatric/Behavioral:  Negative for dysphoric mood and sleep disturbance. The patient is not nervous/anxious.        Objective:      Physical Exam  Vitals and nursing note reviewed.   Constitutional:       Appearance: Normal appearance. She is well-developed.   HENT:      Head: Normocephalic.      Right Ear: External ear normal.      Left Ear: External ear normal.   Eyes:      Pupils: Pupils are equal, round, and reactive to light.   Cardiovascular:      Rate and Rhythm: Normal rate and regular rhythm.      Heart sounds: Normal heart sounds. No murmur heard.     No friction  "rub. No gallop.   Pulmonary:      Effort: Pulmonary effort is normal. No respiratory distress.      Breath sounds: Normal breath sounds.   Abdominal:      Palpations: Abdomen is soft.      Tenderness: There is no abdominal tenderness.   Skin:     General: Skin is warm and dry.          Neurological:      Mental Status: She is alert.         Assessment:       1. Health care maintenance    2. Skin lesion    3. BMI 36.0-36.9,adult    4. Vitamin D deficiency        Plan:       Carol was seen today for follow-up.    Diagnoses and all orders for this visit:    Health care maintenance  -     CBC Auto Differential; Future  -     Comprehensive Metabolic Panel; Future  -     Hemoglobin A1C; Future  -     Lipid Panel; Future  -     TSH; Future  -     Vitamin D; Future    Skin lesion - appears to be a cherry angioma but bothering her as very pruritic, try applying 1% HC cream OTC, see derm  -     Ambulatory referral/consult to Dermatology; Future    BMI 36.0-36.9,adult - doing great with weight loss!  Wt Readings from Last 4 Encounters:   03/04/24 98.9 kg (218 lb)   01/22/24 103.5 kg (228 lb 2.8 oz)   12/14/23 108.2 kg (238 lb 8.6 oz)   10/26/23 108.8 kg (239 lb 13.8 oz)     Vitamin D deficiency - make sure to take vitamin d 1000 IU OTC daily    F/u in 6 months for annual exam and lab work at that time, not due yet    Pt has been given instructions populated from patient instructions database and has verbalized understanding of the after visit summary and information contained wherein.    Follow up with a primary care provider. May go to ER for acute shortness of breath, lightheadedness, fever, or any other emergent complaints or changes in condition.    "This note will be shared with the patient"    Future Appointments   Date Time Provider Department Center   3/6/2024  3:20 PM PRE-ADMIT NURSE 2, ENDO -[facility] ENDO4 [facility]   3/8/2024  8:00 AM [facility], THREE [facility] [place] "   3/15/2024  8:30 AM [facility], THREE [facility] [address] [facility]   3/22/2024  8:00 AM [facility], THREE [facility] [address] [facility]   4/1/2024  8:00 AM [facility], THREE [facility] [address] [facility]   4/5/2024  8:00 AM [facility], THREE [facility] [address] [facility]   4/12/2024  8:00 AM [facility], THREE [facility] [address] [facility]   4/19/2024  8:30 AM [facility], THREE [facility] [address] [facility]   4/26/2024  8:00 AM [facility], THREE [facility] [address] [facility]   5/3/2024  8:00 AM [facility], THREE [facility] [address] [facility]   5/10/2024  8:00 AM [facility], THREE [facility] [address] [facility]   5/17/2024  8:30 AM [facility], THREE [facility] [address] [facility]   5/24/2024  8:00 AM [facility], THREE [facility] [address] [facility]   6/21/2024  8:30 AM [facility], THREE [facility] [address] [facility]   6/21/2024 10:00 AM Rebeka Kelly, OD [facility] OPTOMTY [address]   7/19/2024  8:30 AM [facility], THREE [facility] [address] [facility]   8/16/2024  8:30 AM [facility], THREE [facility] [address] [facility]   8/28/2024  8:30 AM LAB, APPOINTMENT [facility] ANU N[facility] IM [address] [facility]   9/5/2024  8:30 AM Chiquis Messina MD [facility] IM [address] [facility]   9/6/2024  1:00 PM [facility]   9/20/2024  8:30 AM [facility], THREE [facility] [address] [facility]   10/18/2024  8:30 AM [facility], THREE [facility] [address] [facility]   11/15/2024  8:30 AM [facility], THREE [facility] [address] [facility]   12/20/2024  8:30 AM [facility], THREE [facility] [address] [facility]   1/17/2025  8:30 AM [facility], THREE [facility] [address] [facility]   2/21/2025  8:30 AM [facility], THREE [facility] [address] [facility]   3/21/2025  8:30 AM [facility], THREE [facility] [address] [facility]    5/16/2025  8:00 AM [facility], THREE [facility]   6/20/2025  8:00 AM [facility], THREE [facility] [facility] [facility]   7/18/2025  8:00 AM [facility], THREE [facility]   8/15/2025  8:00 AM [facility], THREE [facility] [facility]   9/19/2025  8:00 AM [facility], THREE [facility]   10/17/2025  8:00 AM [facility], THREE [facility]   11/21/2025  8:00 AM [facility], THREE [facility]

## 2024-03-04 NOTE — PATIENT INSTRUCTIONS
Hung Medina,     If you are due for any health screening(s) below please notify me so we can arrange them to be ordered and scheduled. Most healthy patients at your age complete them, but you are free to accept or refuse.     If you can't do it, I'll definitely understand. If you can, I'd certainly appreciate it!    All of your core healthy metrics are met.            RSV vaccine in a few weeks when you turn 59

## 2024-03-06 ENCOUNTER — CLINICAL SUPPORT (OUTPATIENT)
Dept: ENDOSCOPY | Facility: HOSPITAL | Age: 59
End: 2024-03-06
Payer: COMMERCIAL

## 2024-03-06 ENCOUNTER — TELEPHONE (OUTPATIENT)
Dept: ENDOSCOPY | Facility: HOSPITAL | Age: 59
End: 2024-03-06

## 2024-03-06 DIAGNOSIS — Z12.11 ENCOUNTER FOR SCREENING COLONOSCOPY: ICD-10-CM

## 2024-03-06 RX ORDER — POLYETHYLENE GLYCOL 3350, SODIUM SULFATE ANHYDROUS, SODIUM BICARBONATE, SODIUM CHLORIDE, POTASSIUM CHLORIDE 236; 22.74; 6.74; 5.86; 2.97 G/4L; G/4L; G/4L; G/4L; G/4L
4 POWDER, FOR SOLUTION ORAL ONCE
Qty: 4000 ML | Refills: 0 | Status: SHIPPED | OUTPATIENT
Start: 2024-03-06 | End: 2024-03-06

## 2024-03-06 NOTE — TELEPHONE ENCOUNTER
Spoke to patient to schedule procedure(s) Colonoscopy       Physician to perform procedure(s) Dr. ALEKSANDER Pagan  Date of Procedure (s) 6/7/24  Arrival Time 10:30 AM  Time of Procedure(s) 11:30 AM   Location of Procedure(s) [place] 2nd Floor  Type of Rx Prep sent to patient: PEG  Instructions provided to patient via [facility]    Patient was informed on the following information and verbalized understanding. Screening questionnaire reviewed with patient and complete. If procedure requires anesthesia, a responsible adult needs to be present to accompany the patient home, patient cannot drive after receiving anesthesia. Appointment details are tentative, especially check-in time. Patient will receive a prep-op call 7 days prior to confirm check-in time for procedure. If applicable the patient should contact their pharmacy to verify Rx for procedure prep is ready for pick-up. Patient was advised to call the scheduling department at [phone] if pharmacy states no Rx is available. Patient was advised to call the endoscopy scheduling department if any questions or concerns arise.      [facility] Scheduling Department

## 2024-03-08 ENCOUNTER — PATIENT OUTREACH (OUTPATIENT)
Dept: RESEARCH | Facility: CLINIC | Age: 59
End: 2024-03-08
Payer: COMMERCIAL

## 2024-03-08 NOTE — PROGRESS NOTES
03/08/2024  8:20 AM    Patient Name Carol Collins   Appointment Department [facility] HUE WEIGHT MANAGEMENT  Visit Type Audio (Virtual visit)    Clinic Weights   Wt Readings from Last 3 Encounters:   03/04/24 0736 98.9 kg (218 lb)   01/22/24 1021 103.5 kg (228 lb 2.8 oz)   12/14/23 1446 108.2 kg (238 lb 8.6 oz)     Last Reported Weights No data was found      [facility] INTERVENTION CONTACT:   Method of contact:  Phone Call   or Participant-Initiated Contact?:  -initiated contact  Type of intervention Contact:  Scheduled Session  Did the participant attend session?: Yes    Was the patient called within 15 minutes of the scheduled appointment?:  Yes  Is this a make-up session?: No    Which session materials covered in session?:  Session 12  Patient Reported Weight (From External Aria):  218  Time workout: not specified.  Average daily steps per day via Fitbit or activity tracker measurement::  62495  Calorie Prescription::  1600  Safety Criteria Triggered:  None  Toolbox Triggered:  None  Weight Graph Stoplight Status for Dietary Adherence:  Green Light       Goals    None          Additional Notes:      Patient reports Doing Well. Patient is Highly Motivated with being 218 pounds today and aims to Get Below that weight next week. Patient's plans for eating this week include Buying pre-packaged meals. For exercise, patient plans to continue walking 10,000 steps/day.    Doctor was thrilled at her appointment earlier this week that she has lost -20 lbs since last visit.    She is thrilled with her progress so far and is very pleased with new healthy habits. She attributes her weight loss to eating smaller portions of food and walking 10,000 steps/day. Her next goal is to start toning exercises for her abs, arms, and legs. She has organized exercises from magazines that she intends to use as a rotation. Walking 10K steps daily, and every other day adding in toning exercises. She feels like this is doable for her  "since we are about to have time change and gain an "extra" hour of daylight. Frustrated with mosquitos but researching solutions to keep them away.     States that her digestion/constipation has improved greatly since consistently walking and drinking enough water.    Goals:   1) Continue drinking 5 bottles of water/day  2) Continue walking 10,000 steps/day  3) Adding toning exercise, alternating every other day    Arianne Novoa M.Ed.  [employer]   [phone]    "

## 2024-03-15 ENCOUNTER — PATIENT OUTREACH (OUTPATIENT)
Dept: RESEARCH | Facility: CLINIC | Age: 59
End: 2024-03-15
Payer: COMMERCIAL

## 2024-03-15 NOTE — PROGRESS NOTES
03/15/2024  8:49 AM    Patient Name Carol Collins   Appointment Department [facility] WEIGHT MANAGEMENT  Visit Type Audio (Virtual visit)    Clinic Weights   Wt Readings from Last 3 Encounters:   03/04/24 0736 98.9 kg (218 lb)   01/22/24 1021 103.5 kg (228 lb 2.8 oz)   12/14/23 1446 108.2 kg (238 lb 8.6 oz)     Last Reported Weights No data was found      [facility] INTERVENTION CONTACT:   Method of contact:  Phone Call   or Participant-Initiated Contact?:  -initiated contact  Type of intervention Contact:  Scheduled Session  Did the participant attend session?: Yes    Was the patient called within 15 minutes of the scheduled appointment?:  Yes  Is this a make-up session?: No    Which session materials covered in session?:  Session 13  Patient Reported Weight (From External Aria):  217  Time workout: not specified.  Average daily steps per day via Fitbit or activity tracker measurement::  9000  Calorie Prescription::  1600  Safety Criteria Triggered:  None  Toolbox Triggered:  None  Weight Graph Stoplight Status for Dietary Adherence:  Green Light       Goals    None          Additional Notes:    Patient reports Doing Well. Patient is Highly Motivated with being 217 pounds today and aims to Get Below that weight next week. Patient's plans for eating this week include Avoiding temptation. For exercise, patient plans to continue walking 10,000 steps/day & doing toning exercises.     Admits that she has eaten foods off her meal plan on several occasions this past week but enjoying them in smaller portions. Walking & touring the city with her friend in town visiting. Has sustained 9,000 steps/day. Intends to add toning exercises this week.     Discussed navigating holidays and special events with eating.      Goals:   1) Continue drinking 5 bottles of water/day  2) Continue walking 10,000 steps/day  3) Adding toning exercise, alternating every other day    Arianne Novoa M.Ed.  [employer]    [phone]

## 2024-03-22 ENCOUNTER — PATIENT OUTREACH (OUTPATIENT)
Dept: RESEARCH | Facility: CLINIC | Age: 59
End: 2024-03-22
Payer: COMMERCIAL

## 2024-03-22 NOTE — PROGRESS NOTES
03/22/2024  8:28 AM    Patient Name Carol Collins   Appointment Department [facility] PROP WEIGHT MANAGEMENT  Visit Type Audio (Virtual visit)    Clinic Weights   Wt Readings from Last 3 Encounters:   03/04/24 0736 98.9 kg (218 lb)   01/22/24 1021 103.5 kg (228 lb 2.8 oz)   12/14/23 1446 108.2 kg (238 lb 8.6 oz)     Last Reported Weights No data was found      [facility] INTERVENTION CONTACT:   Method of contact:  Phone Call   or Participant-Initiated Contact?:  -initiated contact  Type of intervention Contact:  Scheduled Session  Did the participant attend session?: Yes    Was the patient called within 15 minutes of the scheduled appointment?:  Yes  Is this a make-up session?: No    Which session materials covered in session?:  Session 14  Patient Reported Weight (From External Aria):  217  Time workout: not specified.  Average daily steps per day via Fitbit or activity tracker measurement::  6500  Calorie Prescription::  1600  Safety Criteria Triggered:  None  Toolbox Triggered:  None  Weight Graph Stoplight Status for Dietary Adherence:  Green Light       Goals    None          Additional Notes:    Patient reports Doing Well. Patient is Highly Motivated with being 217 pounds today and aims to Get Below that weight next week. Patient's plans for eating this week include Packing healthy snacks. For exercise, patient plans to resume walking her 10,000 steps/day.    Discussed healthy snacking & patient was surprised to see pretzels on her snacks list. Patient explained that she snacks on occasion when she is tired or fighting sleepiness. Discussed how a variety of snacks can help with things like satiety and energy.     Goals:   1) Continue drinking 5 bottles of water/day  2) Step goal of 10,000 steps/day    Arianne Novoa M.Ed.  [facility]   [phone]

## 2024-03-31 ENCOUNTER — E-VISIT (OUTPATIENT)
Dept: INTERNAL MEDICINE | Facility: CLINIC | Age: 59
End: 2024-03-31
Payer: COMMERCIAL

## 2024-03-31 DIAGNOSIS — H11.32 SUBCONJUNCTIVAL HEMORRHAGE OF LEFT EYE: Primary | ICD-10-CM

## 2024-03-31 PROCEDURE — 99421 OL DIG E/M SVC 5-10 MIN: CPT | Mod: ,,, | Performed by: INTERNAL MEDICINE

## 2024-04-01 ENCOUNTER — E-VISIT (OUTPATIENT)
Dept: FAMILY MEDICINE | Facility: CLINIC | Age: 59
End: 2024-04-01
Payer: COMMERCIAL

## 2024-04-01 DIAGNOSIS — H10.9 CONJUNCTIVITIS, UNSPECIFIED CONJUNCTIVITIS TYPE, UNSPECIFIED LATERALITY: Primary | ICD-10-CM

## 2024-04-01 PROCEDURE — 99499 UNLISTED E&M SERVICE: CPT | Mod: ,,, | Performed by: FAMILY MEDICINE

## 2024-04-01 RX ORDER — POLYMYXIN B SULFATE AND TRIMETHOPRIM 1; 10000 MG/ML; [USP'U]/ML
1 SOLUTION OPHTHALMIC EVERY 6 HOURS
Qty: 10 ML | Refills: 0 | Status: SHIPPED | OUTPATIENT
Start: 2024-04-01 | End: 2024-04-06

## 2024-04-01 NOTE — PROGRESS NOTES
Patient ID: Carol Collins is a 59 y.o. female.    Chief Complaint: Conjunctivitis (Entered automatically based on patient selection in Patient Portal.)    The patient initiated a request through [employer] on 3/31/2024 for evaluation and management with a chief complaint of Conjunctivitis (Entered automatically based on patient selection in Patient Portal.)     I evaluated the questionnaire submission on today.    Ohs Peq Evisit Red Eye    3/31/2024 10:20 PM CDT - Filed by Patient   Do you agree to participate in an E-Visit? Yes   If you have any of the following symptoms, please present to your local ER or call 911:  I acknowledge   What is the main issue you would like addressed today? Redness im left eye under upper lid   Are you able to take your vital signs? No   Which of the following have you been experiencing? One eye is red   Have you had any of the following? None of the above    How long have you been having these symptoms? Just today   Do you have a fever? No, I do not have a fever   Are your symptoms associated with swimming? No, I have not been swimming recently   Have your eyes been exposed to any chemicals, creams, or drops that may be causing irritation? No   Have you suffered any recent injury to your eyes? No   Have you been exposed to anyone with similar symptoms? No   Did or do you wear contact lenses? Yes   Have you had issues with removing your contact lenses? No   Have you had any of the following? None of the above   Have you had any of the following in the past? I have not had any past problems with my eyes.   What medications are you currently using for these symptoms? Nothing   Provide any additional information you feel is important. I am not sure how long i have had this condition. It is i ly visible when i life my eyelid. Both eyes are a little scratchy and rinsing with saline provides cooling relief.   At least one photo is required for treatment to be provided. You can upload a  maximum of three photos of the affected area.           Encounter Diagnosis   Name Primary?    Subconjunctival hemorrhage of left eye Yes        No orders of the defined types were placed in this encounter.           No follow-ups on file.      E-Visit Time Trackin minutes

## 2024-04-01 NOTE — PROGRESS NOTES
Patient ID: Carol Collins is a 59 y.o. female.    Chief Complaint: Conjunctivitis (Entered automatically based on patient selection in Patient Portal.)    The patient initiated a request through [employer] on 4/1/2024 for evaluation and management with a chief complaint of Conjunctivitis (Entered automatically based on patient selection in Patient Portal.)     I evaluated the questionnaire submission on 4/1/24.    Ohs Peq Evisit Red Eye    4/1/2024 11:45 AM CDT - Filed by Patient   Do you agree to participate in an E-Visit? Yes   If you have any of the following symptoms, please present to your local ER or call 911:  I acknowledge   What is the main issue you would like addressed today? Red eye   Are you able to take your vital signs? No   Which of the following have you been experiencing? One eye is red   Have you had any of the following? None of the above    How long have you been having these symptoms? Just today   Do you have a fever? No, I do not have a fever   Are your symptoms associated with swimming? No, I have not been swimming recently   Have your eyes been exposed to any chemicals, creams, or drops that may be causing irritation? No   Have you suffered any recent injury to your eyes? No   Have you been exposed to anyone with similar symptoms? No   Did or do you wear contact lenses? Yes   Have you had issues with removing your contact lenses? No   Have you had any of the following? None of the above   Have you had any of the following in the past? I have not had any past problems with my eyes.   What medications are you currently using for these symptoms? Nothing   Provide any additional information you feel is important. Nothing additional   At least one photo is required for treatment to be provided. You can upload a maximum of three photos of the affected area.           Encounter Diagnosis   Name Primary?    Conjunctivitis, unspecified conjunctivitis type, unspecified laterality Yes        No orders  of the defined types were placed in this encounter.     Medications Ordered This Encounter   Medications    polymyxin B sulf-trimethoprim (POLYTRIM) 10,000 unit- 1 mg/mL Drop     Sig: Place 1 drop into the left eye every 6 (six) hours. for 5 days     Dispense:  10 mL     Refill:  0        No follow-ups on file.      E-Visit Time Trackin mins

## 2024-04-05 ENCOUNTER — PATIENT OUTREACH (OUTPATIENT)
Dept: RESEARCH | Facility: CLINIC | Age: 59
End: 2024-04-05
Payer: COMMERCIAL

## 2024-04-05 NOTE — PROGRESS NOTES
04/05/2024  8:07 AM    Patient Name Carol Collins   Appointment Department [facility] PROPLETICIA WEIGHT MANAGEMENT  Visit Type Audio (Virtual visit)    Clinic Weights   Wt Readings from Last 3 Encounters:   03/04/24 0736 98.9 kg (218 lb)   01/22/24 1021 103.5 kg (228 lb 2.8 oz)   12/14/23 1446 108.2 kg (238 lb 8.6 oz)     Last Reported Weights No data was found      [employer] [employer] [facility] INTERVENTION CONTACT:   Method of contact:  Phone Call   or Participant-Initiated Contact?:  -initiated contact  Type of intervention Contact:  Scheduled Session  Did the participant attend session?: Yes    Was the patient called within 15 minutes of the scheduled appointment?:  Yes  Is this a make-up session?: No    Which session materials covered in session?:  Session 15 and Session 16  Patient Reported Weight (From External Aria):  218  Patient-reported weekly minutes of physical activity::  240  Average daily steps per day via Fitbit or activity tracker measurement::  9000  Calorie Prescription::  1600  Safety Criteria Triggered:  None  Toolbox Triggered:  None  Weight Graph Stoplight Status for Dietary Adherence:  Green Light       Goals    None          Additional Notes:    Patient reports Doing Well. Patient is Highly Motivated with being 218 pounds today and aims to Get Below that weight next week. Patient's plans for eating this week include eating more high fiber/water snacks (apples & salad). For exercise, patient plans to continue aiming for her 10,000 daily steps goal & joining in her work organized exercise class at 4pm. Still wants to add strength training exercise to her routine but will aim for this after next week.    Discussed Easter holiday and she explained how even though she ate off her plan for a few days, she is back on track and is maintaining weight loss.     Discussed eating foods that are high in fiber and water: apples, salad, and clear broth soup. States that she could eat more vegetable snacks but enjoys the  fruit better.     Confirms that she is still weighing every day - scale not transmitting weights from 4/2 - 4/5. Patient keeps a paper list of weights recorded. Manually entered missing weights up to date.     Goals:   1) Continue aiming for 10,000 steps/day  2) Addition of work exercise class at 4pm  3) Eating more high fiber & high water containing foods: apples, salad, and clear broth soups    JOHANN OlmedoEd.  [facility]   [phone]

## 2024-04-12 ENCOUNTER — PATIENT OUTREACH (OUTPATIENT)
Dept: RESEARCH | Facility: CLINIC | Age: 59
End: 2024-04-12
Payer: COMMERCIAL

## 2024-04-12 NOTE — PROGRESS NOTES
"04/12/2024  8:04 AM    Patient Name Carol Collins   Appointment Department [facility] HUE WEIGHT MANAGEMENT  Visit Type Audio (Virtual visit)    Clinic Weights   Wt Readings from Last 3 Encounters:   03/04/24 0736 98.9 kg (218 lb)   01/22/24 1021 103.5 kg (228 lb 2.8 oz)   12/14/23 1446 108.2 kg (238 lb 8.6 oz)     Last Reported Weights No data was found      [facility] INTERVENTION CONTACT:   Method of contact:  Phone Call   or Participant-Initiated Contact?:  -initiated contact  Type of intervention Contact:  Scheduled Session  Did the participant attend session?: Yes    Was the patient called within 15 minutes of the scheduled appointment?:  Yes  Is this a make-up session?: No    Which session materials covered in session?:  Session 17  Patient Reported Weight (From External Raia):  215  Patient-reported weekly minutes of physical activity::  300  Average daily steps per day via Fitbit or activity tracker measurement::  83291  Calorie Prescription::  1600  Safety Criteria Triggered:  None  Toolbox Triggered:  None  Weight Graph Stoplight Status for Dietary Adherence:  Green Light       Goals    None          Additional Notes:    Patient reports Doing Well. Patient is Highly Motivated with being 215 pounds today and aims to Get Below that weight next week. She is very excited that today she hit a new "all time low" with her weight, 215 lbs. Patient's plans for eating this week include Buying pre-packaged meals and following her calorie prescription. For exercise, patient plans to add morning walks to her routine to start adjusting to warmer temperatures.    Discussed stress and stress management. Patient states that she notices when she walks on days for >1hr she feels like her mood is better overall. She explained that she is in a season of life that is not very stressful right now and she feels good about her stress management.    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 She explained that " "during college, she was a competitive power-, and she is hoping to re-ignite that competitive spirit through the Senior Olympics games. She admits that power-lifting is not what she wants to do anymore, but is researching and looking for something fun to join.     Goals:   1) Morning walking - at least 30 minutes, adjusting to outdoors temperature change   2) Following 1600 calorie meal plan, "Calorie Budget"     Arianne Novoa M.Ed.  [facility]   [phone]        "

## 2024-04-19 ENCOUNTER — PATIENT OUTREACH (OUTPATIENT)
Dept: RESEARCH | Facility: CLINIC | Age: 59
End: 2024-04-19
Payer: COMMERCIAL

## 2024-04-19 NOTE — PROGRESS NOTES
04/19/2024  9:03 AM    Patient Name Carol Collins   Appointment Department [facility] PROPLETCIIA WEIGHT MANAGEMENT  Visit Type Audio (Virtual visit)    Clinic Weights   Wt Readings from Last 3 Encounters:   03/04/24 0736 98.9 kg (218 lb)   01/22/24 1021 103.5 kg (228 lb 2.8 oz)   12/14/23 1446 108.2 kg (238 lb 8.6 oz)     Last Reported Weights No data was found      [facility] INTERVENTION CONTACT:   Method of contact:  Phone Call   or Participant-Initiated Contact?:  -initiated contact  Type of intervention Contact:  Scheduled Session  Did the participant attend session?: Yes    Was the patient called within 15 minutes of the scheduled appointment?:  Yes  Is this a make-up session?: No    Which session materials covered in session?:  Session 18  Patient Reported Weight (From External Aria):  214  Patient-reported weekly minutes of physical activity::  230  Average daily steps per day via Fitbit or activity tracker measurement::  25999  Calorie Prescription::  1600  Safety Criteria Triggered:  None  Toolbox Triggered:  None  Weight Graph Stoplight Status for Dietary Adherence:  Green Light       Goals    None          Additional Notes:    Patient reports Doing Well. Patient is Highly Motivated with being 214 pounds today and aims to Get Below that weight next week. Patient's plans for eating this week include Buying pre-packaged meals. For exercise, patient plans to continue walking her 15 minute breaks throughout the day (totaling ~200 minutes/week).    Patient explained that she is doing well with her walks and prefers taking several 15 minute walking breaks throughout the day opposed to walking for longer periods of time. Also continuing with her water goal of 5 bottles or 80 ounces. She states that she has started sipping her water bottles instead of drinking the 16 ounces all at one time (5 times/day). She prefers this as it helps with her urgency to use the restroom.     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     Discussed "skipping extra calories" material and the "pick 2/4" strategy.     Goals:   1) Continue walking breaks in 15 minute increments  2) 80 ounces of water, sipping     Arianne Novoa M.Ed.  [facility]   [phone]        "

## 2024-04-26 ENCOUNTER — PATIENT OUTREACH (OUTPATIENT)
Dept: RESEARCH | Facility: CLINIC | Age: 59
End: 2024-04-26
Payer: COMMERCIAL

## 2024-04-26 NOTE — PROGRESS NOTES
"04/26/2024  8:21 AM    Patient Name Carol Collins   Appointment Department [facility] HUE WEIGHT MANAGEMENT  Visit Type Audio (Virtual visit)    Clinic Weights   Wt Readings from Last 3 Encounters:   03/04/24 0736 98.9 kg (218 lb)   01/22/24 1021 103.5 kg (228 lb 2.8 oz)   12/14/23 1446 108.2 kg (238 lb 8.6 oz)     Last Reported Weights No data was found      [facility] INTERVENTION CONTACT:   Method of contact:  Phone Call   or Participant-Initiated Contact?:  -initiated contact  Type of intervention Contact:  Scheduled Session  Did the participant attend session?: Yes    Was the patient called within 15 minutes of the scheduled appointment?:  Yes  Is this a make-up session?: No    Which session materials covered in session?:  Session 19  Patient Reported Weight (From External Aria):  213  Time workout: not specified.  Average Daily Steps: 8000 - 14266.  Calorie Prescription::  1600  Safety Criteria Triggered:  None  Toolbox Triggered:  None  Weight Graph Stoplight Status for Dietary Adherence:  Green Light       Goals    None          Additional Notes:    Patient reports Doing Well. Patient is Highly Motivated with being 213 pounds today and aims to Get Below that weight next week. Patient's plans for eating this week include Buying pre-packaged meals and trying to make her own "power bowls". For exercise, patient plans to get back on track with 10,000 steps daily.    Patient explained that she would like to try to to make her own "Power Bowl" at home. States that she is still hindered by the caterpillars in her yard which make it difficult to walk outdoors but intends to walk indoors instead.     Discussed 19 flavors. States that she is already doing many of these things and if using a regular jam or syrup, only using a small amount.     Doing well with drinking the 80 ounces of water, sipping.     Goals:   1) Continue drinking 80 oz of water, sipping bottles  2) Experimenting with "Power Bowls"   3) " Walking: 10,000 steps/day - 15 minutes at a time    Arianne Novoa [facility]  [facility]   [phone]

## 2024-05-03 ENCOUNTER — PATIENT OUTREACH (OUTPATIENT)
Dept: RESEARCH | Facility: CLINIC | Age: 59
End: 2024-05-03
Payer: COMMERCIAL

## 2024-05-03 NOTE — PROGRESS NOTES
05/03/2024  8:03 AM    Patient Name Carol Collins   Appointment Department [facility] PROP WEIGHT MANAGEMENT  Visit Type Audio (Virtual visit)    Clinic Weights   Wt Readings from Last 3 Encounters:   03/04/24 0736 98.9 kg (218 lb)   01/22/24 1021 103.5 kg (228 lb 2.8 oz)   12/14/23 1446 108.2 kg (238 lb 8.6 oz)     Last Reported Weights No data was found      [facility] INTERVENTION CONTACT:   Method of contact:  Phone Call   or Participant-Initiated Contact?:  -initiated contact  Type of intervention Contact:  Scheduled Session  Did the participant attend session?: Yes    Was the patient called within 15 minutes of the scheduled appointment?:  Yes  Is this a make-up session?: No    Which session materials covered in session?:  Session 20 and Session 21  Patient Reported Weight (From External Aria):  213  Time workout: 150.  Average daily steps per day via Fitbit or activity tracker measurement::  97959  Calorie Prescription::  1600  Safety Criteria Triggered:  None  Toolbox Triggered:  None  Weight Graph Stoplight Status for Dietary Adherence:  Green Light       Goals    None          Additional Notes:    Patient reports Doing Well. Patient is Highly Motivated with being 213 pounds today and aims to Get Below that weight next week. Patient's plans for eating this week include continuing to make her own power bowls and trying tofu. Doing well with 80 oz water goal. For exercise, patient plans to incorporate strength training 2 days/week with her walking/steps goal.    Discussed transition to monthly coaching. Patient agrees she is equipped for this because she has made lifestyle changes and is very consistent.     Goals:   1) Incorporating 2 days of strength training with walking exercise routine  2) 80 oz water goal    Arianne Novoa [facility]  [facility]   [phone]

## 2024-05-10 ENCOUNTER — PATIENT OUTREACH (OUTPATIENT)
Dept: RESEARCH | Facility: CLINIC | Age: 59
End: 2024-05-10
Payer: COMMERCIAL

## 2024-05-10 NOTE — PROGRESS NOTES
"05/10/2024  8:16 AM    Patient Name Carol Collins   Appointment Department [facility] HUE WEIGHT MANAGEMENT  Visit Type Audio (Virtual visit)    Clinic Weights   Wt Readings from Last 3 Encounters:   03/04/24 0736 98.9 kg (218 lb)   01/22/24 1021 103.5 kg (228 lb 2.8 oz)   12/14/23 1446 108.2 kg (238 lb 8.6 oz)     Last Reported Weights No data was found      [facility] INTERVENTION CONTACT:   Method of contact:  Phone Call   or Participant-Initiated Contact?:  -initiated contact  Type of intervention Contact:  Scheduled Session  Did the participant attend session?: Yes    Was the patient called within 15 minutes of the scheduled appointment?:  Yes  Is this a make-up session?: No    Which session materials covered in session?:  Session 22  Patient Reported Weight (From External Aria):  213  Time workout: not specified.  Average daily steps per day via Fitbit or activity tracker measurement::  08094  Calorie Prescription::  1600  Safety Criteria Triggered:  None  Toolbox Triggered:  None  Weight Graph Stoplight Status for Dietary Adherence:  Green Light       Goals    None          Additional Notes:    Patient reports Doing Well. Patient is Highly Motivated with being 213 pounds today and aims to Get Below that weight next week. She is very pleased that she has been maintaining around 212-213 lbs recently.     Patient's plans for eating this week include Buying pre-packaged meals and portioning her own "Power Bowls." Still drinking ~80 oz of water/day.    For exercise, patient plans to start incorporating strength training with her walking routine. On days with strength training, agrees that step goal may be closer to 8,000 steps.    Would like to know about how much protein she is eating/day. Downloading [email or web address] for informational purposes.      Goals:   1) Strength training, 20 mins, 2 days/week  2) Walking, aiming for 10,000 steps/day  3) Downloading [email or web address] for nutrition tracking " information      Arianne Novoa [facility]  [facility]   [phone]

## 2024-05-17 ENCOUNTER — PATIENT OUTREACH (OUTPATIENT)
Dept: RESEARCH | Facility: CLINIC | Age: 59
End: 2024-05-17
Payer: COMMERCIAL

## 2024-05-17 NOTE — PROGRESS NOTES
05/17/2024  8:51 AM    Patient Name Carol oCllins   Appointment Department [facility] PROP WEIGHT MANAGEMENT  Visit Type Audio (Virtual visit)    Clinic Weights   Wt Readings from Last 3 Encounters:   03/04/24 0736 98.9 kg (218 lb)   01/22/24 1021 103.5 kg (228 lb 2.8 oz)   12/14/23 1446 108.2 kg (238 lb 8.6 oz)     Last Reported Weights No data was found      [facility] INTERVENTION CONTACT:   Method of contact:  Phone Call   or Participant-Initiated Contact?:  -initiated contact  Type of intervention Contact:  Scheduled Session  Did the participant attend session?: Yes    Was the patient called within 15 minutes of the scheduled appointment?:  Yes  Is this a make-up session?: No    Which session materials covered in session?:  Session 23 and Session 24  Patient Reported Weight (From External Aria):  212  Time workout: not specified.  Average Daily Steps: not specified.  Calorie Prescription::  1600  Safety Criteria Triggered:  None  Toolbox Triggered:  None  Weight Graph Stoplight Status for Dietary Adherence:  Green Light       Goals    None          Additional Notes:    Last weekly coaching call to patient. States she is doing really well overall and feels solid in her healthy habits:     - Walking (estimated) 10,000 steps/day  - Drinking 80 ounces of water  - Eating portion controlled meals    New habits working toward:   - Using MyFitnessPal to track calories and meals  - Strength training exercises 2 days/week for 20 minutes at a time      Agrees to reschedule June appointment for 6/13 at 8am.     Discussed healthy grocery shopping. Denies other concerns for health  today.    Arianne Novoa, [facility]  [facility]   [phone]

## 2024-05-30 ENCOUNTER — PATIENT OUTREACH (OUTPATIENT)
Dept: RESEARCH | Facility: CLINIC | Age: 59
End: 2024-05-30
Payer: COMMERCIAL

## 2024-05-30 ENCOUNTER — TELEPHONE (OUTPATIENT)
Dept: ENDOSCOPY | Facility: HOSPITAL | Age: 59
End: 2024-05-30
Payer: COMMERCIAL

## 2024-05-30 DIAGNOSIS — Z12.11 SCREEN FOR COLON CANCER: Primary | ICD-10-CM

## 2024-06-03 ENCOUNTER — ANESTHESIA EVENT (OUTPATIENT)
Dept: ENDOSCOPY | Facility: HOSPITAL | Age: 59
End: 2024-06-03
Payer: COMMERCIAL

## 2024-06-03 NOTE — ANESTHESIA PREPROCEDURE EVALUATION
2024  Carol Collins is a 59 y.o., female.  [facility]  Anesthesia Pre-Operative Evaluation       Patient Name: Carol Collins  YOB: 1965  MRN: [id number]  CSN: [id number]      Code Status: No Order   Date of Procedure: 2024  Anesthesia: Choice Procedure: Procedure(s) (LRB):  COLONOSCOPY (N/A)  Pre-Operative Diagnosis: Encounter for screening colonoscopy [Z12.11]  Proceduralist: Surgeons and Role:     * Gali Pagan MD - Primary Nurse: (Unknown)      SUBJECTIVE:   Carol Collins is a 59 y.o. female who  has a past medical history of GERD (gastroesophageal reflux disease) and Heart palpitations (2018)..     she is not on any long-term medications.     ALLERGIES:     Review of patient's allergies indicates:   Allergen Reactions    Grass pollen- grass standard     House dust Other (See Comments)     LDA:          Lines/Drains/Airways       None                  Anesthesia Evaluation      Airway   Mallampati: II  Dental      Pulmonary    Cardiovascular   Dysrhythmias: Palpitations.    Neuro/Psych      GI/Hepatic/Renal    (+) hiatal hernia, GERD    Endo/Other    Abdominal                   MEDICATIONS:     Antibiotics (From admission, onward)      None          VTE Risk Mitigation (From admission, onward)      None              No current facility-administered medications for this encounter.     Current Outpatient Medications   Medication Sig Dispense Refill    vitamin D (VITAMIN D3) 1000 units Tab Take 1,000 Units by mouth once daily.            History:   There are no hospital problems to display for this patient.    Surgical History:    has a past surgical history that includes  section, classic (); Hysterectomy (); lumpectomy (Right, ); and Breast biopsy (Left, ).   Social History:    reports being sexually active and has had  partner(s) who are male. She reports using the following method of birth control/protection: See Surgical Hx.  reports that she has never smoked. She has never used smokeless tobacco. She reports current alcohol use. She reports that she does not use drugs.     OBJECTIVE:     Vital Signs (Most Recent):    Vital Signs Range (Last 24H):          There is no height or weight on file to calculate BMI.   Wt Readings from Last 4 Encounters:   03/04/24 98.9 kg (218 lb)   01/22/24 103.5 kg (228 lb 2.8 oz)   12/14/23 108.2 kg (238 lb 8.6 oz)   10/26/23 108.8 kg (239 lb 13.8 oz)       Significant Labs:  Lab Results   Component Value Date    WBC 4.28 11/17/2023    HGB 13.5 11/17/2023    HCT 43.6 11/17/2023     11/17/2023     09/22/2023    K 3.9 09/22/2023     09/22/2023    CREATININE 0.8 09/22/2023    BUN 9 09/22/2023    CO2 28 09/22/2023     09/22/2023    CALCIUM 9.0 09/22/2023    MG 2.1 08/31/2022    ALKPHOS 121 09/22/2023    ALT 19 09/22/2023    AST 23 09/22/2023    ALBUMIN 3.4 (L) 09/22/2023    HGBA1C 5.7 (H) 09/22/2023    TROPONINI <0.01 07/19/2019     No LMP recorded. Patient has had a hysterectomy.  No results found for this or any previous visit (from the past 72 hour(s)).    EKG:   Results for orders placed or performed in visit on 08/31/22   IN OFFICE EKG 12-LEAD (to Avis)    Collection Time: 08/31/22  4:39 PM    Narrative    Test Reason : M54.6,K21.9,K44.9,I10,R00.2,I51.7,Z87.19,    Vent. Rate : 069 BPM     Atrial Rate : 069 BPM     P-R Int : 174 ms          QRS Dur : 076 ms      QT Int : 402 ms       P-R-T Axes : 057 043 041 degrees     QTc Int : 430 ms    Normal sinus rhythm  Possible Left atrial enlargement  Borderline Abnormal ECG  When compared with ECG of 19-JUL-2019 08:32,  No significant change was found  Confirmed by Supa JUNIOR, Stephan DUVAL ([id number]) on 9/1/2022 9:23:23 AM    Referred By:  MIKE VELASCO           Confirmed By:Stephan Cowart MD       TTE:  Results for orders placed or  "performed during the hospital encounter of 09/06/22   Echo   Result Value Ref Range    BSA 2.22 m2    TDI SEPTAL 0.09 m/s    LV LATERAL E/E' RATIO 6.85 m/s    LV SEPTAL E/E' RATIO 9.89 m/s    LA WIDTH 3.36 cm    TDI LATERAL 0.13 m/s    LVIDd 4.06 3.5 - 6.0 cm    IVS 0.94 0.6 - 1.1 cm    Posterior Wall 0.75 0.6 - 1.1 cm    LVIDs 2.54 2.1 - 4.0 cm    FS 37 28 - 44 %    LA volume 43.98 cm3    Sinus 2.38 cm    STJ 2.09 cm    Ascending aorta 2.54 cm    LV mass 103.09 g    LA size 3.43 cm    RVDD 3.18 cm    TAPSE 2.30 cm    RV S' 12.21 cm/s    Left Ventricle Relative Wall Thickness 0.37 cm    AV mean gradient 4 mmHg    AV valve area 2.56 cm2    AV Velocity Ratio 0.89     AV index (prosthetic) 0.93     MV valve area p 1/2 method 4.80 cm2    E/A ratio 1.09     Mean e' 0.11 m/s    E wave deceleration time 158.02 msec    MV "A" wave duration 14.27 msec    Pulm vein S/D ratio 1.16     LVOT diameter 1.87 cm    LVOT area 2.7 cm2    LVOT peak kole 1.16 m/s    LVOT peak VTI 27.49 cm    Ao peak kole 1.30 m/s    Ao VTI 29.51 cm    LVOT stroke volume 75.46 cm3    AV peak gradient 7 mmHg    E/E' ratio 8.09 m/s    MV Peak E Kole 0.89 m/s    TR Max Kole 2.19 m/s    MV stenosis pressure 1/2 time 45.83 ms    MV Peak A Kole 0.82 m/s    PV Peak S Kole 0.51 m/s    PV Peak D Kole 0.44 m/s    LV Systolic Volume 23.16 mL    LV Systolic Volume Index 10.9 mL/m2    LV Diastolic Volume 72.51 mL    LV Diastolic Volume Index 34.04 mL/m2    LA Volume Index 20.6 mL/m2    LV Mass Index 48 g/m2    RA Major Axis 4.58 cm    Left Atrium Minor Axis 4.53 cm    Left Atrium Major Axis 4.45 cm    Triscuspid Valve Regurgitation Peak Gradient 19 mmHg    LA Volume Index (Mod) 16.5 mL/m2    LA volume (mod) 35.06 cm3    RA Width 3.25 cm    Right Atrial Pressure (from IVC) 3 mmHg    EF 60 %    TV resting pulmonary artery pressure 22 mmHg    Narrative    · The left ventricle is normal in size with normal systolic function. The   estimated ejection fraction is 60%.  · Normal " "right ventricular size with normal right ventricular systolic   function.  · Normal left ventricular diastolic function.  · The estimated PA systolic pressure is 22 mmHg.  · Normal central venous pressure (3 mmHg).        EF   Date Value Ref Range Status   09/06/2022 60 % Final      No results found for this or any previous visit.  BERRY:  No results found for this or any previous visit.  Stress Test:  No results found for this or any previous visit.     LHC:  No results found for this or any previous visit.     PFT:  No results found for: "FEV1", "FVC", "SLG7UOM", "TLC", "DLCO"     ASSESSMENT/PLAN:        Pre-op Assessment    I have reviewed the Patient Summary Reports.     I have reviewed the Nursing Notes. I have reviewed the NPO Status.   I have reviewed the Medications.     Review of Systems  Anesthesia Hx:  No problems with previous Anesthesia             Denies Family Hx of Anesthesia complications.    Denies Personal Hx of Anesthesia complications.                    Hematology/Oncology:  Hematology Normal   Oncology Normal                                   EENT/Dental:  EENT/Dental Normal           Cardiovascular:  Cardiovascular Normal        Dysrhythmias: Palpitations.                                    Pulmonary:  Pulmonary Normal                       Renal/:  Renal/ Normal                 Hepatic/GI:    Hiatal Hernia, GERD     Schatzki's ring               Musculoskeletal:  Musculoskeletal Normal                Neurological:  Neurology Normal                                      Endocrine:  Endocrine Normal    Vitamin D deficiency        Dermatological:  Skin Normal    Psych:   anxiety               Physical Exam    Airway:  Mallampati: II     Anesthesia Plan  Type of Anesthesia, risks & benefits discussed:    Anesthesia Type: Gen Natural Airway  Intra-op Monitoring Plan: Standard ASA Monitors  Post Op Pain Control Plan: multimodal analgesia  Induction:  IV  Informed Consent: Informed consent signed " with the Patient and all parties understand the risks and agree with anesthesia plan.  All questions answered.   ASA Score: 2  Day of Surgery Review of History & Physical: H&P Update referred to the surgeon/provider.    Ready For Surgery From Anesthesia Perspective.     .

## 2024-06-06 NOTE — H&P
Short Stay Endoscopy History and Physical    PCP - Chiquis Messina MD  Referring Physician - PRE-ADMIT NURSE 2, ENDO -[facility]  No address on file    Procedure - Colonoscopy  ASA - per anesthesia  Mallampati - per anesthesia  History of Anesthesia problems - no  Family history Anesthesia problems -  no   Plan of anesthesia - General    HPI  59 y.o. female  Reason for procedure:   Encounter for screening colonoscopy [Z12.11]         ROS:  Constitutional: No fevers, chills, No weight loss  CV: No chest pain  Pulm: No cough, No shortness of breath  GI: see HPI    Medical History:  has a past medical history of GERD (gastroesophageal reflux disease) and Heart palpitations (2018).    Surgical History:  has a past surgical history that includes  section, classic (); Hysterectomy (); lumpectomy (Right, ); and Breast biopsy (Left, ).    Family History: family history includes Breast cancer in an other family member; Breast cancer (age of onset: 52) in her sister; Breast cancer (age of onset: 60) in her sister; Cancer in her cousin, cousin, and cousin; Diabetes in her brother, mother, and sister; Hypertension in her father, mother, sister, sister, and sister; No Known Problems in her son and son; Ovarian cancer (age of onset: 73) in her mother; Vulvar Cancer in her sister..    Social History:  reports that she has never smoked. She has never used smokeless tobacco. She reports current alcohol use. She reports that she does not use drugs.    Review of patient's allergies indicates:   Allergen Reactions    Grass pollen- grass standard     House dust Other (See Comments)       Medications:   Medications Prior to Admission   Medication Sig Dispense Refill Last Dose    vitamin D (VITAMIN D3) 1000 units Tab Take 1,000 Units by mouth once daily.          Physical Exam:    Vital Signs: There were no vitals filed for this visit.    General Appearance: Well appearing in no acute  distress  Abdomen: Soft, non tender, non distended with normal bowel sounds, no masses    Labs:  Lab Results   Component Value Date    WBC 4.28 11/17/2023    HGB 13.5 11/17/2023    HCT 43.6 11/17/2023     11/17/2023    CHOL 198 09/22/2023    TRIG 69 09/22/2023    HDL 41 09/22/2023    ALT 19 09/22/2023    AST 23 09/22/2023     09/22/2023    K 3.9 09/22/2023     09/22/2023    CREATININE 0.8 09/22/2023    BUN 9 09/22/2023    CO2 28 09/22/2023    TSH 1.922 09/22/2023    HGBA1C 5.7 (H) 09/22/2023       I have explained the risks and benefits of this endoscopic procedure to the patient including but not limited to bleeding, inflammation, infection, perforation, and death.    Assessment/Plan:     CRC Screening     - Proceed with colonoscopy     Gali Pagan MD  Gastroenterology   [facility]

## 2024-06-07 ENCOUNTER — ANESTHESIA (OUTPATIENT)
Dept: ENDOSCOPY | Facility: HOSPITAL | Age: 59
End: 2024-06-07
Payer: COMMERCIAL

## 2024-06-07 ENCOUNTER — HOSPITAL ENCOUNTER (OUTPATIENT)
Facility: HOSPITAL | Age: 59
Discharge: HOME | End: 2024-06-07
Attending: STUDENT IN AN ORGANIZED HEALTH CARE EDUCATION/TRAINING PROGRAM | Admitting: STUDENT IN AN ORGANIZED HEALTH CARE EDUCATION/TRAINING PROGRAM
Payer: COMMERCIAL

## 2024-06-07 ENCOUNTER — TELEPHONE (OUTPATIENT)
Dept: ENDOSCOPY | Facility: HOSPITAL | Age: 59
End: 2024-06-07
Payer: COMMERCIAL

## 2024-06-07 VITALS
BODY MASS INDEX: 35.16 KG/M2 | DIASTOLIC BLOOD PRESSURE: 78 MMHG | SYSTOLIC BLOOD PRESSURE: 160 MMHG | TEMPERATURE: 99 F | HEIGHT: 65 IN | RESPIRATION RATE: 16 BRPM | OXYGEN SATURATION: 100 % | HEART RATE: 60 BPM | WEIGHT: 211 LBS

## 2024-06-07 DIAGNOSIS — Z12.11 COLON CANCER SCREENING: Primary | ICD-10-CM

## 2024-06-07 PROCEDURE — 25000003 PHARM REV CODE 250: Performed by: REGISTERED NURSE

## 2024-06-07 PROCEDURE — 63600175 PHARM REV CODE 636 W HCPCS: Performed by: REGISTERED NURSE

## 2024-06-07 PROCEDURE — 88305 TISSUE EXAM BY PATHOLOGIST: CPT | Mod: 26,,, | Performed by: PATHOLOGY

## 2024-06-07 PROCEDURE — 99900035 HC TECH TIME PER 15 MIN (STAT)

## 2024-06-07 PROCEDURE — 88305 TISSUE EXAM BY PATHOLOGIST: CPT | Performed by: PATHOLOGY

## 2024-06-07 PROCEDURE — 27201089 HC SNARE, DISP (ANY): Performed by: STUDENT IN AN ORGANIZED HEALTH CARE EDUCATION/TRAINING PROGRAM

## 2024-06-07 PROCEDURE — D9220A PRA ANESTHESIA: Mod: ,,, | Performed by: REGISTERED NURSE

## 2024-06-07 PROCEDURE — 45385 COLONOSCOPY W/LESION REMOVAL: CPT | Mod: 33,,, | Performed by: STUDENT IN AN ORGANIZED HEALTH CARE EDUCATION/TRAINING PROGRAM

## 2024-06-07 PROCEDURE — 45385 COLONOSCOPY W/LESION REMOVAL: CPT | Mod: PT | Performed by: STUDENT IN AN ORGANIZED HEALTH CARE EDUCATION/TRAINING PROGRAM

## 2024-06-07 PROCEDURE — 25000003 PHARM REV CODE 250: Performed by: STUDENT IN AN ORGANIZED HEALTH CARE EDUCATION/TRAINING PROGRAM

## 2024-06-07 PROCEDURE — 94761 N-INVAS EAR/PLS OXIMETRY MLT: CPT

## 2024-06-07 PROCEDURE — 37000009 HC ANESTHESIA EA ADD 15 MINS: Performed by: STUDENT IN AN ORGANIZED HEALTH CARE EDUCATION/TRAINING PROGRAM

## 2024-06-07 PROCEDURE — 37000008 HC ANESTHESIA 1ST 15 MINUTES: Performed by: STUDENT IN AN ORGANIZED HEALTH CARE EDUCATION/TRAINING PROGRAM

## 2024-06-07 RX ORDER — PROPOFOL 10 MG/ML
VIAL (ML) INTRAVENOUS
Status: DISCONTINUED | OUTPATIENT
Start: 2024-06-07 | End: 2024-06-07

## 2024-06-07 RX ORDER — LIDOCAINE HYDROCHLORIDE 20 MG/ML
INJECTION INTRAVENOUS
Status: DISCONTINUED | OUTPATIENT
Start: 2024-06-07 | End: 2024-06-07

## 2024-06-07 RX ORDER — SODIUM CHLORIDE 9 MG/ML
INJECTION, SOLUTION INTRAVENOUS CONTINUOUS
Status: DISCONTINUED | OUTPATIENT
Start: 2024-06-07 | End: 2024-06-07 | Stop reason: HOSPADM

## 2024-06-07 RX ORDER — PROPOFOL 10 MG/ML
VIAL (ML) INTRAVENOUS CONTINUOUS PRN
Status: DISCONTINUED | OUTPATIENT
Start: 2024-06-07 | End: 2024-06-07

## 2024-06-07 RX ADMIN — PROPOFOL 150 MCG/KG/MIN: 10 INJECTION, EMULSION INTRAVENOUS at 10:06

## 2024-06-07 RX ADMIN — SODIUM CHLORIDE: 0.9 INJECTION, SOLUTION INTRAVENOUS at 10:06

## 2024-06-07 RX ADMIN — LIDOCAINE HYDROCHLORIDE 100 MG: 20 INJECTION INTRAVENOUS at 10:06

## 2024-06-07 RX ADMIN — PROPOFOL 100 MG: 10 INJECTION, EMULSION INTRAVENOUS at 10:06

## 2024-06-07 NOTE — TRANSFER OF CARE
"Anesthesia Transfer of Care Note    Patient: Carol Collins    Procedure(s) Performed: Procedure(s) (LRB):  COLONOSCOPY (N/A)    Patient location: PACU    Anesthesia Type: general    Transport from OR: Transported from OR on room air with adequate spontaneous ventilation    Post pain: adequate analgesia    Post assessment: no apparent anesthetic complications    Post vital signs: stable    Level of consciousness: awake    Nausea/Vomiting: no nausea/vomiting    Complications: none    Transfer of care protocol was followed      Last vitals: Visit Vitals  BP (!) 143/66 (BP Location: Left arm, Patient Position: Sitting)   Pulse 83   Temp 37 °C (98.6 °F) (Temporal)   Resp 18   Ht 5' 5" (1.651 m)   Wt 95.7 kg (211 lb)   SpO2 99%   Breastfeeding No   BMI 35.11 kg/m²     "

## 2024-06-07 NOTE — PROVATION PATIENT INSTRUCTIONS
Discharge Summary/Instructions after an Endoscopic Procedure  Patient Name: Carol Collins  Patient MRN: [id number]  Patient YOB: 1965  Friday, June 7, 2024  Gali Pagan MD  Dear patient,  As a result of recent federal legislation (The Federal Cures Act), you may   receive lab or pathology results from your procedure in your [facility]   account before your physician is able to contact you. Your physician or   their representative will relay the results to you with their   recommendations at their soonest availability.  Thank you,  RESTRICTIONS:  During your procedure today, you received medications for sedation.  These   medications may affect your judgment, balance and coordination.  Therefore,   for 24 hours, you have the following restrictions:   - DO NOT drive a car, operate machinery, make legal/financial decisions,   sign important papers or drink alcohol.    ACTIVITY:  Today: no heavy lifting, straining or running due to procedural   sedation/anesthesia.  The following day: return to full activity including work.  DIET:  Eat and drink normally unless instructed otherwise.     TREATMENT FOR COMMON SIDE EFFECTS:  - Mild abdominal pain, nausea, belching, bloating or excessive gas:  rest,   eat lightly and use a heating pad.  - Sore Throat: treat with throat lozenges and/or gargle with warm salt   water.  - Because air was used during the procedure, expelling large amounts of air   from your rectum or belching is normal.  - If a bowel prep was taken, you may not have a bowel movement for 1-3 days.    This is normal.  SYMPTOMS TO WATCH FOR AND REPORT TO YOUR PHYSICIAN:  1. Abdominal pain or bloating, other than gas cramps.  2. Chest pain.  3. Back pain.  4. Signs of infection such as: chills or fever occurring within 24 hours   after the procedure.  5. Rectal bleeding, which would show as bright red, maroon, or black stools.   (A tablespoon of blood from the rectum is not serious, especially if    hemorrhoids are present.)  6. Vomiting.  7. Weakness or dizziness.  GO DIRECTLY TO THE NEAREST EMERGENCY ROOM IF YOU HAVE ANY OF THE FOLLOWING:      Difficulty breathing              Chills and/or fever over 101 F   Persistent vomiting and/or vomiting blood   Severe abdominal pain   Severe chest pain   Black, tarry stools   Bleeding- more than one tablespoon   Any other symptom or condition that you feel may need urgent attention  Your doctor recommends these additional instructions:  If any biopsies were taken, your doctors clinic will contact you in 1 to 2   weeks with any results.  - Discharge patient to home (ambulatory).   - Resume regular diet.   - Continue present medications.   - Await pathology results.   - Repeat colonoscopy at next available appointment (within 3 months) for   surveillance due to poor prep  For questions, problems or results please call your physician - Gali Pagan MD at Work:  [phone].  [facility], EMERGENCY ROOM PHONE NUMBER: [phone]  IF A COMPLICATION OR EMERGENCY SITUATION ARISES AND YOU ARE UNABLE TO REACH   YOUR PHYSICIAN - GO DIRECTLY TO THE EMERGENCY ROOM.  Gali Pagan MD  6/7/2024 11:01:02 AM  This report has been verified and signed electronically.  Dear patient,  As a result of recent federal legislation (The Federal Cures Act), you may   receive lab or pathology results from your procedure in your [facility]   account before your physician is able to contact you. Your physician or   their representative will relay the results to you with their   recommendations at their soonest availability.  Thank you,  PROVATION

## 2024-06-07 NOTE — ANESTHESIA POSTPROCEDURE EVALUATION
Anesthesia Post Evaluation    Patient: Carol Collins    Procedure(s) Performed: Procedure(s) (LRB):  COLONOSCOPY (N/A)    Final Anesthesia Type: general      Patient location during evaluation: PACU  Patient participation: Yes- Able to Participate  Level of consciousness: awake and alert  Post-procedure vital signs: reviewed and stable  Pain management: adequate  Airway patency: patent    PONV status at discharge: No PONV  Anesthetic complications: no      Cardiovascular status: stable  Respiratory status: spontaneous ventilation  Hydration status: euvolemic  Follow-up not needed.          Vitals Value Taken Time   /78 06/07/24 1120   Temp 37.2 °C (98.9 °F) 06/07/24 1100   Pulse 63 06/07/24 1124   Resp 22 06/07/24 1124   SpO2 100 % 06/07/24 1124   Vitals shown include unfiled device data.      Event Time   Out of Recovery 11:15:00         Pain/Miguel Score: Miguel Score: 10 (6/7/2024 11:15 AM)

## 2024-06-07 NOTE — PLAN OF CARE
Pt in preop VSS, PIV inserted. Pt denies any open wounds on body or the use of any weight loss injections. Pt needs  Procedural consents otherwise ready to roll.

## 2024-06-07 NOTE — TELEPHONE ENCOUNTER
Patient called to go over  prep schedule today patient stated that her poop was brown and lumpy patient ask if she can finish prep pt was told no because it was to late to finish prep patient hung up called back and stated it was clear and wanted to proceed with having procedure patient was inform it is a possibility that she will have to reschedule and if she have the procedure and not clean she will have to repeat and it can become a insurance issue

## 2024-06-10 ENCOUNTER — PATIENT MESSAGE (OUTPATIENT)
Dept: INTERNAL MEDICINE | Facility: CLINIC | Age: 59
End: 2024-06-10
Payer: COMMERCIAL

## 2024-06-12 LAB
FINAL PATHOLOGIC DIAGNOSIS: NORMAL
GROSS: NORMAL
Lab: NORMAL

## 2024-06-14 ENCOUNTER — PATIENT OUTREACH (OUTPATIENT)
Dept: RESEARCH | Facility: CLINIC | Age: 59
End: 2024-06-14
Payer: COMMERCIAL

## 2024-06-14 NOTE — PROGRESS NOTES
06/14/2024  8:39 AM    Patient Name Carol Collins   Appointment Department [facility] PROP WEIGHT MANAGEMENT  Visit Type Audio (Virtual visit)    Clinic Weights   Wt Readings from Last 3 Encounters:   06/07/24 1027 95.7 kg (211 lb)   03/04/24 0736 98.9 kg (218 lb)   01/22/24 1021 103.5 kg (228 lb 2.8 oz)     Last Reported Weights No data was found      [facility] INTERVENTION CONTACT:   Method of contact:  Phone Call   or Participant-Initiated Contact?:  -initiated contact  Type of intervention Contact:  Scheduled Session  Did the participant attend session?: Yes    Was the patient called within 15 minutes of the scheduled appointment?:  Yes  Is this a make-up session?: No    Which session materials covered in session?:  Session 25  Patient Reported Weight (From External Aria):  211  Time workout: not reported.  Average Daily Steps: 10,000 some days, but not every day.  Calorie Prescription::  1600  Safety Criteria Triggered:  None  Toolbox Triggered:  None  Weight Graph Stoplight Status for Dietary Adherence:  Green Light       Goals    None          Additional Notes:      Patient reports doing well overall. Is maintaining her weight around 211 lbs. She is still having slow digestion & constipation - planning to discuss with doctor.     Discussed behavior/action goals verses outcomes/results goals.     Healthy Habits/Behavior Goals:  1) Walking 30 - 60 minutes daily, 10,000 steps/day  2) Weight training 2 days/week  3) Water - drinking a bottle in the morning (first thing)    JUANCARLOS [facility]  [employer] [facility]   [phone]

## 2024-06-20 ENCOUNTER — TELEPHONE (OUTPATIENT)
Dept: ENDOSCOPY | Facility: HOSPITAL | Age: 59
End: 2024-06-20
Payer: COMMERCIAL

## 2024-06-20 NOTE — TELEPHONE ENCOUNTER
"----- Message from Janae Arciniega sent at 6/10/2024  8:31 AM CDT -----    ----- Message -----  From: Gali Pagan MD  Sent: 2024  10:57 AM CDT  To: [facility] Patients    Procedure: Colonoscopy    Diagnosis: surveillance colonoscopy --- poor prep      Procedure Timin-12 weeks    #If within 4 weeks selected, please lotus as high priority#    #If greater than 12 weeks, please select "5-12 weeks" and delay sending until 3 months prior to requested date#     Location: Any Site    Additional Scheduling Information: No scheduling concerns    Prep Specifications:Extended/Constipation prep    Is the patient taking a GLP-1 Agonist:no    Have you attached a patient to this message: yes  "

## 2024-06-21 ENCOUNTER — TELEPHONE (OUTPATIENT)
Dept: ENDOSCOPY | Facility: HOSPITAL | Age: 59
End: 2024-06-21
Payer: COMMERCIAL

## 2024-06-21 ENCOUNTER — PATIENT MESSAGE (OUTPATIENT)
Dept: OPTOMETRY | Facility: CLINIC | Age: 59
End: 2024-06-21

## 2024-06-21 ENCOUNTER — OFFICE VISIT (OUTPATIENT)
Dept: OPTOMETRY | Facility: CLINIC | Age: 59
End: 2024-06-21
Payer: COMMERCIAL

## 2024-06-21 VITALS — WEIGHT: 211 LBS | HEIGHT: 65 IN | BODY MASS INDEX: 35.16 KG/M2

## 2024-06-21 DIAGNOSIS — Z97.3 WEARS CONTACT LENSES: ICD-10-CM

## 2024-06-21 DIAGNOSIS — E11.9 DIABETES MELLITUS WITHOUT COMPLICATION: Primary | ICD-10-CM

## 2024-06-21 DIAGNOSIS — H52.13 MYOPIA OF BOTH EYES: ICD-10-CM

## 2024-06-21 DIAGNOSIS — Z12.11 ENCOUNTER FOR SCREENING COLONOSCOPY: Primary | ICD-10-CM

## 2024-06-21 DIAGNOSIS — H40.013 OPEN ANGLE WITH BORDERLINE FINDINGS AND LOW GLAUCOMA RISK IN BOTH EYES: ICD-10-CM

## 2024-06-21 PROCEDURE — 92015 DETERMINE REFRACTIVE STATE: CPT | Mod: ,,, | Performed by: OPTOMETRIST

## 2024-06-21 PROCEDURE — 92014 COMPRE OPH EXAM EST PT 1/>: CPT | Mod: ,,, | Performed by: OPTOMETRIST

## 2024-06-21 PROCEDURE — 99999 PR PBB SHADOW E&M-EST. PATIENT-LVL II: CPT | Mod: PBBFAC,,, | Performed by: OPTOMETRIST

## 2024-06-21 PROCEDURE — 92310 CONTACT LENS FITTING OU: CPT | Mod: CSM,S$GLB,, | Performed by: OPTOMETRIST

## 2024-06-21 RX ORDER — POLYETHYLENE GLYCOL-3350 AND ELECTROLYTES 236; 6.74; 5.86; 2.97; 22.74 G/274.31G; G/274.31G; G/274.31G; G/274.31G; G/274.31G
POWDER, FOR SOLUTION ORAL
COMMUNITY
Start: 2024-03-07

## 2024-06-21 RX ORDER — POLYETHYLENE GLYCOL-3350 AND ELECTROLYTES WITH FLAVOR PACK 240; 5.84; 2.98; 6.72; 22.72 G/278.26G; G/278.26G; G/278.26G; G/278.26G; G/278.26G
POWDER, FOR SOLUTION ORAL
COMMUNITY
Start: 2024-05-30

## 2024-06-21 NOTE — TELEPHONE ENCOUNTER
Spoke to patient to schedule procedure(s) Colonoscopy       Physician to perform procedure(s) Dr. ANABEL Emmanuel  Date of Procedure (s) 9/20/2024  Arrival Time 7:14 AM   Time of Procedure(s) 8:14 AM    Location of Procedure(s) [place] 2nd Floor  Type of Rx Prep sent to patient: PEG/extended   Instructions provided to patient via [facility]    Patient was informed on the following information and verbalized understanding. Screening questionnaire reviewed with patient and complete. If procedure requires anesthesia, a responsible adult needs to be present to accompany the patient home, patient cannot drive after receiving anesthesia. Appointment details are tentative, especially check-in time. Patient will receive a prep-op call 7 days prior to confirm check-in time for procedure. If applicable the patient should contact their pharmacy to verify Rx for procedure prep is ready for pick-up. Patient was advised to call the scheduling department at [phone] if pharmacy states no Rx is available. Patient was advised to call the endoscopy scheduling department if any questions or concerns arise.      [facility] Scheduling Department

## 2024-06-21 NOTE — TELEPHONE ENCOUNTER
"----- Message from Janae Arciniega sent at 6/10/2024  8:31 AM CDT -----    ----- Message -----  From: Gali Pagan MD  Sent: 2024  10:57 AM CDT  To: [facility] Patients    Procedure: Colonoscopy    Diagnosis: surveillance colonoscopy --- poor prep      Procedure Timin-12 weeks    #If within 4 weeks selected, please lotus as high priority#    #If greater than 12 weeks, please select "5-12 weeks" and delay sending until 3 months prior to requested date#     Location: Any Site    Additional Scheduling Information: No scheduling concerns    Prep Specifications:Extended/Constipation prep    Is the patient taking a GLP-1 Agonist:no    Have you attached a patient to this message: yes  "

## 2024-06-24 NOTE — TELEPHONE ENCOUNTER
Ruben Ricci is calling to request a refill on the following medication(s):  Requested Prescriptions     Pending Prescriptions Disp Refills    FLUoxetine (PROZAC) 20 MG capsule 30 capsule 5     Sig: Take 1 capsule by mouth daily       Last Visit Date (If Applicable):  2/1/2024    Next Visit Date:    Visit date not found     Contacted the patient to schedule an endoscopy procedure(s) colonoscopy. The patient did not answer the call and left a voice message requesting a call back.

## 2024-06-25 ENCOUNTER — PATIENT MESSAGE (OUTPATIENT)
Dept: OPTOMETRY | Facility: CLINIC | Age: 59
End: 2024-06-25
Payer: COMMERCIAL

## 2024-06-25 ENCOUNTER — TELEPHONE (OUTPATIENT)
Dept: OPTOMETRY | Facility: CLINIC | Age: 59
End: 2024-06-25
Payer: COMMERCIAL

## 2024-06-25 NOTE — TELEPHONE ENCOUNTER
----- Message from Rebeka Kelly, OD sent at 6/25/2024  4:41 PM CDT -----  Regarding: Testing pt  I saw this pt on Friday but just now finished her chart. I told her we would call her to get her scheduled for glaucoma testing at main campus: HVF 24-2 SS OU and ONH RNFL OCT OU.   Can you call her to get her scheduled? Thanks

## 2024-06-25 NOTE — PROGRESS NOTES
HPI    ALYX: 12/22  Chief complaint (CC): patient is here for annual eye exam today.  Patient   has noticed more trouble seeing the computer and reading with glasses and   contacts. OD contact feel scratchy, OS is comfortable.  Glasses? + 1 yr. old  Contacts? +  H/o eye surgery, injections or laser: chalazion removal several yrs. ago  H/o eye injury: -  Known eye conditions? See above  Family h/o eye conditions? Father with glaucoma  Eye gtts? -  (-) Flashes (-)  Floaters (-) Mucous   (-)  Tearing (-) Itching (-) Burning   (-) Headaches (-) Eye Pain/discomfort (-) Irritation   (-)  Redness (-) Double vision (-) Blurry vision  Hemoglobin A1C       Date                     Value               Ref Range             Status                09/22/2023               5.7 (H)             4.0 - 5.6 %           Final           Last edited by Rebeka Kelly, OD on 6/25/2024  4:41 PM.            Assessment /Plan     For exam results, see Encounter Report.    Diabetes mellitus without complication    Myopia of both eyes    Wears contact lenses    Open angle with borderline findings and low glaucoma risk in both eyes  -     OCT, Optic Nerve - OU - Both Eyes; Future; Expected date: 07/25/2024  -     Nunez Visual Field - OU - Extended - Both Eyes; Future; Expected date: 07/25/2024      1. No retinopathy noted today.  Continued control with primary care physician and annual comprehensive eye exam.     2.   Eyeglass Final Rx       Eyeglass Final Rx         Sphere Cylinder Axis Add    Right -5.25 Sphere  +2.50    Left -4.50 +0.50 115 +2.50      Type: PAL    Expiration Date: 6/21/2025                   3. Updated CL Rx. Initially good comfort and vision. Given CL trials to take home. If happy with comfort and vision of trial lenses, may order annual supply. If issues arise, RTC for CL f/u. Reviewed proper CL care and hygiene. Monitor yearly unless changes noted sooner.      Contact Lens Current Rx       Current Contact Lens Rx  (Trial Lens, Dispensed)         Brand Diameter Sphere Cylinder Lens Dist VA Near VA Centration Movement    Right Acuvue Oasys 1 Day 14.3 -2.50 Sphere NEAR  20/20 Well-centered Adequate    Left Acuvue Oasys 1 Day 14.3 -4.25 Sphere DIST 20/25  Well-centered Adequate                   4. Educated pt on findings. (+)FHx. Will order baseline RNFL OCT and 24-2 HVF. Patient to have testing in 1-2 months. Will call with results. Determine further management/treatment at that time. Monitor 1-2 months.      RTC in 1 month for glaucoma testing, 1 year for annual eye exam unless changes noted sooner.

## 2024-07-16 ENCOUNTER — PATIENT MESSAGE (OUTPATIENT)
Dept: ADMINISTRATIVE | Facility: HOSPITAL | Age: 59
End: 2024-07-16
Payer: COMMERCIAL

## 2024-07-19 ENCOUNTER — PATIENT OUTREACH (OUTPATIENT)
Dept: RESEARCH | Facility: CLINIC | Age: 59
End: 2024-07-19
Payer: COMMERCIAL

## 2024-07-19 NOTE — PROGRESS NOTES
07/19/2024  8:58 AM    Patient Name Carol Collins   Appointment Department [facility] PROPEL WEIGHT MANAGEMENT  Visit Type Audio (Virtual visit)    Clinic Weights   Wt Readings from Last 3 Encounters:   06/21/24 0950 95.7 kg (211 lb)   06/07/24 1027 95.7 kg (211 lb)   03/04/24 0736 98.9 kg (218 lb)     Last Reported Weights No data was found      [facility] INTERVENTION CONTACT:   Method of contact:  Phone Call   or Participant-Initiated Contact?:  -initiated contact  Type of intervention Contact:  Scheduled Session  Did the participant attend session?: Yes    Was the patient called within 15 minutes of the scheduled appointment?:  Yes  Is this a make-up session?: No    Which session materials covered in session?:  Session 26  Patient Reported Weight (From External Aria):  210  Time workout: not specified.  Average Daily Steps: not specified.  Calorie Prescription::  1600  Safety Criteria Triggered:  None  Toolbox Triggered:  None  Weight Graph Stoplight Status for Dietary Adherence:  Green Light       Goals    None          Additional Notes:    Patient reports doing well overall. She explained that she has been in a very busy season with work and the material for session 26 - time management was very appropriate for her today.     Discussed activities like meal prepping, delegating, and switching up her current walking schedule to better utilize her time.     Goals:   1) Walking 30 - 60 minutes daily, 10,000 steps/day  2) Water - drinking a bottle in the morning (first thing)    Arianne Novoa [facility]  [employer] [facility]   [phone]

## 2024-07-23 ENCOUNTER — PATIENT OUTREACH (OUTPATIENT)
Dept: ADMINISTRATIVE | Facility: HOSPITAL | Age: 59
End: 2024-07-23
Payer: COMMERCIAL

## 2024-07-23 DIAGNOSIS — Z13.1 DIABETES MELLITUS SCREENING: Primary | ICD-10-CM

## 2024-07-23 NOTE — PROGRESS NOTES
Health Maintenance Due   Topic Date Due    Diabetes Urine Screening  Never done    Pneumococcal Vaccines (Age 0-64) (1 of 2 - PCV) Never done    Foot Exam  Never done    Low Dose Statin  Never done    Hemoglobin A1c  03/22/2024    RSV Vaccine (Age 60+ and Pregnant patients) (1 - 1-dose 60+ series) Never done    Mammogram  09/05/2024       Chart reviewed and updated. Reconciled immunizations.    Lia Duval LPN   Clinical Care Coordinator  Primary Care and Wellness

## 2024-07-24 ENCOUNTER — PATIENT OUTREACH (OUTPATIENT)
Dept: ADMINISTRATIVE | Facility: HOSPITAL | Age: 59
End: 2024-07-24
Payer: COMMERCIAL

## 2024-07-24 DIAGNOSIS — Z13.1 DIABETES MELLITUS SCREENING: Primary | ICD-10-CM

## 2024-07-24 NOTE — PROGRESS NOTES
Health Maintenance Due   Topic Date Due    Diabetes Urine Screening  Never done    Pneumococcal Vaccines (Age 0-64) (1 of 2 - PCV) Never done    Foot Exam  Never done    Low Dose Statin  Never done    Hemoglobin A1c  03/22/2024    RSV Vaccine (Age 60+ and Pregnant patients) (1 - 1-dose 60+ series) Never done    Mammogram  09/05/2024       Chart reviewed and updated. Reconciled immunizations. Ordered and scheduled u/m.    Lia Duval LPN   Clinical Care Coordinator  Primary Care and Wellness

## 2024-08-04 ENCOUNTER — PATIENT MESSAGE (OUTPATIENT)
Dept: HEMATOLOGY/ONCOLOGY | Facility: CLINIC | Age: 59
End: 2024-08-04
Payer: COMMERCIAL

## 2024-08-05 ENCOUNTER — PATIENT MESSAGE (OUTPATIENT)
Dept: HEMATOLOGY/ONCOLOGY | Facility: CLINIC | Age: 59
End: 2024-08-05
Payer: COMMERCIAL

## 2024-08-05 ENCOUNTER — TELEPHONE (OUTPATIENT)
Dept: HEMATOLOGY/ONCOLOGY | Facility: CLINIC | Age: 59
End: 2024-08-05
Payer: COMMERCIAL

## 2024-08-16 ENCOUNTER — PATIENT OUTREACH (OUTPATIENT)
Dept: RESEARCH | Facility: CLINIC | Age: 59
End: 2024-08-16
Payer: COMMERCIAL

## 2024-08-16 NOTE — PROGRESS NOTES
08/16/2024  8:54 AM    Patient Name Carol Collins   Appointment Department [facility] PROP WEIGHT MANAGEMENT  Visit Type Audio (Virtual visit)    Clinic Weights   Wt Readings from Last 3 Encounters:   06/21/24 0950 95.7 kg (211 lb)   06/07/24 1027 95.7 kg (211 lb)   03/04/24 0736 98.9 kg (218 lb)     Last Reported Weights No data was found      [facility] INTERVENTION CONTACT:   Method of contact:  Phone Call   or Participant-Initiated Contact?:  -initiated contact  Type of intervention Contact:  Scheduled Session  Did the participant attend session?: Yes    Was the patient called within 15 minutes of the scheduled appointment?:  Yes  Is this a make-up session?: No    Which session materials covered in session?:  Session 27  Patient Reported Weight (From External Aria):  211  Time workout: not specified.  Average Daily Steps: 5,000 - 7,000.  Calorie Prescription::  1600  Safety Criteria Triggered:  None  Toolbox Triggered:  None  Weight Graph Stoplight Status for Dietary Adherence:  Green Light       Goals    None          Additional Notes:    Patient reports doing well overall and is maintaining her weight loss. She explained that due to her work schedule (and weather) she has not been meeting 10,000 steps/day and averaging between 5,000-7,000. She believes that meeting her step goal will help continue to promote her weight loss.     Discussed healthy swaps and she is already doing many of these things.     Goals:   1) Substitute 1 meal/day for a pre-portioned frozen meal  2) Walking 10,000 steps/day    Arianne Novoa [facility]  [employer] [facility]   [phone]

## 2024-08-28 ENCOUNTER — LAB VISIT (OUTPATIENT)
Dept: LAB | Facility: HOSPITAL | Age: 59
End: 2024-08-28
Attending: PHYSICIAN ASSISTANT
Payer: COMMERCIAL

## 2024-08-28 DIAGNOSIS — Z00.00 HEALTH CARE MAINTENANCE: ICD-10-CM

## 2024-08-28 LAB
25(OH)D3+25(OH)D2 SERPL-MCNC: 35 NG/ML (ref 30–96)
ALBUMIN SERPL BCP-MCNC: 3.8 G/DL (ref 3.5–5.2)
ALP SERPL-CCNC: 132 U/L (ref 55–135)
ALT SERPL W/O P-5'-P-CCNC: 21 U/L (ref 10–44)
ANION GAP SERPL CALC-SCNC: 11 MMOL/L (ref 8–16)
AST SERPL-CCNC: 21 U/L (ref 10–40)
BASOPHILS # BLD AUTO: 0.02 K/UL (ref 0–0.2)
BASOPHILS NFR BLD: 0.6 % (ref 0–1.9)
BILIRUB SERPL-MCNC: 0.4 MG/DL (ref 0.1–1)
BUN SERPL-MCNC: 10 MG/DL (ref 6–20)
CALCIUM SERPL-MCNC: 9.9 MG/DL (ref 8.7–10.5)
CHLORIDE SERPL-SCNC: 106 MMOL/L (ref 95–110)
CHOLEST SERPL-MCNC: 205 MG/DL (ref 120–199)
CHOLEST/HDLC SERPL: 4.2 {RATIO} (ref 2–5)
CO2 SERPL-SCNC: 27 MMOL/L (ref 23–29)
CREAT SERPL-MCNC: 0.9 MG/DL (ref 0.5–1.4)
DIFFERENTIAL METHOD BLD: ABNORMAL
EOSINOPHIL # BLD AUTO: 0.1 K/UL (ref 0–0.5)
EOSINOPHIL NFR BLD: 1.5 % (ref 0–8)
ERYTHROCYTE [DISTWIDTH] IN BLOOD BY AUTOMATED COUNT: 13.2 % (ref 11.5–14.5)
EST. GFR  (NO RACE VARIABLE): >60 ML/MIN/1.73 M^2
ESTIMATED AVG GLUCOSE: 111 MG/DL (ref 68–131)
GLUCOSE SERPL-MCNC: 97 MG/DL (ref 70–110)
HBA1C MFR BLD: 5.5 % (ref 4–5.6)
HCT VFR BLD AUTO: 45.5 % (ref 37–48.5)
HDLC SERPL-MCNC: 49 MG/DL (ref 40–75)
HDLC SERPL: 23.9 % (ref 20–50)
HGB BLD-MCNC: 14.4 G/DL (ref 12–16)
IMM GRANULOCYTES # BLD AUTO: 0 K/UL (ref 0–0.04)
IMM GRANULOCYTES NFR BLD AUTO: 0 % (ref 0–0.5)
LDLC SERPL CALC-MCNC: 140.8 MG/DL (ref 63–159)
LYMPHOCYTES # BLD AUTO: 1.8 K/UL (ref 1–4.8)
LYMPHOCYTES NFR BLD: 52.4 % (ref 18–48)
MCH RBC QN AUTO: 29.4 PG (ref 27–31)
MCHC RBC AUTO-ENTMCNC: 31.6 G/DL (ref 32–36)
MCV RBC AUTO: 93 FL (ref 82–98)
MONOCYTES # BLD AUTO: 0.3 K/UL (ref 0.3–1)
MONOCYTES NFR BLD: 7.7 % (ref 4–15)
NEUTROPHILS # BLD AUTO: 1.3 K/UL (ref 1.8–7.7)
NEUTROPHILS NFR BLD: 37.8 % (ref 38–73)
NONHDLC SERPL-MCNC: 156 MG/DL
NRBC BLD-RTO: 0 /100 WBC
PLATELET # BLD AUTO: 284 K/UL (ref 150–450)
PMV BLD AUTO: 12.1 FL (ref 9.2–12.9)
POTASSIUM SERPL-SCNC: 4.5 MMOL/L (ref 3.5–5.1)
PROT SERPL-MCNC: 7.8 G/DL (ref 6–8.4)
RBC # BLD AUTO: 4.9 M/UL (ref 4–5.4)
SODIUM SERPL-SCNC: 144 MMOL/L (ref 136–145)
TRIGL SERPL-MCNC: 76 MG/DL (ref 30–150)
TSH SERPL DL<=0.005 MIU/L-ACNC: 1.31 UIU/ML (ref 0.4–4)
WBC # BLD AUTO: 3.36 K/UL (ref 3.9–12.7)

## 2024-08-28 PROCEDURE — 80053 COMPREHEN METABOLIC PANEL: CPT | Performed by: PHYSICIAN ASSISTANT

## 2024-08-28 PROCEDURE — 36415 COLL VENOUS BLD VENIPUNCTURE: CPT | Mod: PN | Performed by: PHYSICIAN ASSISTANT

## 2024-08-28 PROCEDURE — 80061 LIPID PANEL: CPT | Performed by: PHYSICIAN ASSISTANT

## 2024-08-28 PROCEDURE — 83036 HEMOGLOBIN GLYCOSYLATED A1C: CPT | Performed by: PHYSICIAN ASSISTANT

## 2024-08-28 PROCEDURE — 85025 COMPLETE CBC W/AUTO DIFF WBC: CPT | Performed by: PHYSICIAN ASSISTANT

## 2024-08-28 PROCEDURE — 82306 VITAMIN D 25 HYDROXY: CPT | Performed by: PHYSICIAN ASSISTANT

## 2024-08-28 PROCEDURE — 84443 ASSAY THYROID STIM HORMONE: CPT | Performed by: PHYSICIAN ASSISTANT

## 2024-08-29 ENCOUNTER — PATIENT MESSAGE (OUTPATIENT)
Dept: INTERNAL MEDICINE | Facility: CLINIC | Age: 59
End: 2024-08-29
Payer: COMMERCIAL

## 2024-08-29 NOTE — TELEPHONE ENCOUNTER
BP Readings from Last 5 Encounters:   06/07/24 (!) 160/78   03/04/24 120/80   01/22/24 129/73   12/14/23 130/80   10/26/23 130/70      The 10-year ASCVD risk score (Olive BEGUM, et al., 2019) is: 23.4%    Values used to calculate the score:      Age: 59 years      Sex: Female      Is Non- : Yes      Diabetic: Yes      Tobacco smoker: No      Systolic Blood Pressure: 160 mmHg      Is BP treated: No      HDL Cholesterol: 49 mg/dL      Total Cholesterol: 205 mg/dL    Recalculated risk score using her typically lower BP readings and risk score is 10.8%

## 2024-09-06 ENCOUNTER — HOSPITAL ENCOUNTER (OUTPATIENT)
Dept: RADIOLOGY | Facility: HOSPITAL | Age: 59
Discharge: HOME | End: 2024-09-06
Attending: PHYSICIAN ASSISTANT
Payer: COMMERCIAL

## 2024-09-06 ENCOUNTER — OFFICE VISIT (OUTPATIENT)
Dept: INTERNAL MEDICINE | Facility: CLINIC | Age: 59
End: 2024-09-06
Payer: COMMERCIAL

## 2024-09-06 VITALS
DIASTOLIC BLOOD PRESSURE: 84 MMHG | BODY MASS INDEX: 35.72 KG/M2 | HEART RATE: 73 BPM | WEIGHT: 214.38 LBS | OXYGEN SATURATION: 100 % | SYSTOLIC BLOOD PRESSURE: 144 MMHG | HEIGHT: 65 IN

## 2024-09-06 VITALS — WEIGHT: 212 LBS | BODY MASS INDEX: 35.28 KG/M2

## 2024-09-06 DIAGNOSIS — E55.9 VITAMIN D DEFICIENCY: ICD-10-CM

## 2024-09-06 DIAGNOSIS — Z23 NEED FOR VACCINATION: ICD-10-CM

## 2024-09-06 DIAGNOSIS — K64.9 HEMORRHOIDS, UNSPECIFIED HEMORRHOID TYPE: ICD-10-CM

## 2024-09-06 DIAGNOSIS — Z12.31 SCREENING MAMMOGRAM, ENCOUNTER FOR: ICD-10-CM

## 2024-09-06 DIAGNOSIS — R03.0 ELEVATED BLOOD PRESSURE READING: Primary | ICD-10-CM

## 2024-09-06 DIAGNOSIS — K59.00 CONSTIPATION, UNSPECIFIED CONSTIPATION TYPE: ICD-10-CM

## 2024-09-06 PROCEDURE — 77063 BREAST TOMOSYNTHESIS BI: CPT | Mod: 26,,, | Performed by: RADIOLOGY

## 2024-09-06 PROCEDURE — 77067 SCR MAMMO BI INCL CAD: CPT | Mod: 26,,, | Performed by: RADIOLOGY

## 2024-09-06 PROCEDURE — 77067 SCR MAMMO BI INCL CAD: CPT | Mod: TC

## 2024-09-06 PROCEDURE — 99999 PR PBB SHADOW E&M-EST. PATIENT-LVL IV: CPT | Mod: PBBFAC,,, | Performed by: INTERNAL MEDICINE

## 2024-09-06 RX ORDER — DOCUSATE SODIUM 100 MG/1
100 CAPSULE, LIQUID FILLED ORAL 2 TIMES DAILY
COMMUNITY
Start: 2024-09-06

## 2024-09-06 NOTE — PROGRESS NOTES
"Subjective:       Patient ID: Carol Collins is a 59 y.o. female.    Chief Complaint: Annual Exam (Blood work results questions/Weight loss questions /No longer taking hormones /Been having problems sleeping/Hemorid/ )    HPI  59 y.o. female here for follow up of    BMI down to 35  [facility]  Health  check ins weekly, goal setting, 10,000 steps/day, diet much healthier  Son is getting  in April 2025.     Not using oxybutynin or gemtesa for overactive bladder, incontinence, concerned about side effects     HLD with 23% ten year ascvd risk but calculated with diabetes and old BP reading. Recalculation today is 7.2%.    Hemorrhoids since 2nd pregnancy, really bothering her currently. Has lots of constipation. Drinks 64 oz water daily. Takes metamucil daily. Takes probiotic which helps.     Lab Results   Component Value Date    HGBA1C 5.5 08/28/2024    HGBA1C 5.7 (H) 09/22/2023    HGBA1C 5.7 (H) 12/05/2022     Lots of temperature fluctuations during night. Hot and cold, has tried variety of temperature settings without relief. Off estradiol for over a year.     Lots of mucus and post nasal drip.   Review of Systems   Constitutional:  Negative for fever.   Respiratory:  Negative for shortness of breath.    Cardiovascular:  Negative for chest pain.   Musculoskeletal: Negative.    Skin: Negative.        Objective:   BP (!) 144/84   Pulse 73   Ht 5' 5" (1.651 m)   Wt 97.3 kg (214 lb 6.4 oz)   SpO2 100%   BMI 35.68 kg/m²      Physical Exam  Constitutional:       General: She is not in acute distress.     Appearance: She is well-developed. She is not diaphoretic.   HENT:      Head: Normocephalic and atraumatic.   Cardiovascular:      Rate and Rhythm: Normal rate and regular rhythm.   Pulmonary:      Effort: Pulmonary effort is normal. No respiratory distress.      Breath sounds: No wheezing or rales.   Skin:     General: Skin is warm and dry.   Neurological:      Mental Status: She is alert and oriented " to person, place, and time.   Psychiatric:         Behavior: Behavior normal.         Assessment:       1. Elevated blood pressure reading    2. Vitamin D deficiency    3. BMI 35.0-35.9,adult    4. Need for vaccination    5. Constipation, unspecified constipation type    6. Hemorrhoids, unspecified hemorrhoid type        Plan:       Elevated blood pressure reading  Might need to restart medication  She will send me home readings in 2 weeks  Continuing to work on lifestyle changes    Vitamin D deficiency  Continue vitamin d supplement    BMI 35.0-35.9,adult  Continuing to work on lifestyle changes  Discussed glp1 agonists, she defers for now    Need for vaccination    Constipation, unspecified constipation type  -     start docusate sodium (COLACE) 100 MG capsule; Take 1 capsule (100 mg total) by mouth 2 (two) times daily.  Water and fiber intake reviewed    Hemorrhoids, unspecified hemorrhoid type  Treat constipation as above    Post nasal drip  Flonase OTC    Temperature dysregulation at night  Plans to monitor during cooler months; if worsens will let me know        Health Maintenance Due   Topic    Mammogram       Rsv, flu  BP  Statin discussed - her re-calculated ten year ascvd risk is in acceptable range and she defers medication at this time.

## 2024-09-13 ENCOUNTER — TELEPHONE (OUTPATIENT)
Dept: ENDOSCOPY | Facility: HOSPITAL | Age: 59
End: 2024-09-13
Payer: COMMERCIAL

## 2024-09-13 ENCOUNTER — PATIENT OUTREACH (OUTPATIENT)
Dept: RESEARCH | Facility: CLINIC | Age: 59
End: 2024-09-13
Payer: COMMERCIAL

## 2024-09-13 ENCOUNTER — PATIENT MESSAGE (OUTPATIENT)
Dept: ENDOSCOPY | Facility: HOSPITAL | Age: 59
End: 2024-09-13
Payer: COMMERCIAL

## 2024-09-13 NOTE — TELEPHONE ENCOUNTER
Spoke to patient for pre-call to confirm scheduled Colonoscopy and patient verbalized understanding of the following:       Date of Procedure (s)  verified 9/20/24  Arrival Time 7:15 AM verified.  Location of Procedure(s) [place] 2nd Floor verified.  NPO status reinforced. Ok to continue clear liquids up until 4 hours prior to the Endoscopy procedure.     Pt confirmed receipt of prep instructions and Rx prep (if applicable).  Instructions provided to patient via [facility]  Pt confirmed ride home after procedure if procedure requires anesthesia.   Pre-call screening questionnaire reviewed and completed with patient.   Appointment details are tentative, including check-in time.  If the patient begins taking any blood thinning medications, injectable weight loss/diabetes medications (other than insulin), or Adipex (phentermine) patient was instructed to contact the endoscopy scheduling department as soon as possible.  Patient was advised to call the endoscopy scheduling department if any questions or concerns arise.       [facility] Scheduling Department

## 2024-09-13 NOTE — PROGRESS NOTES
09/13/2024  9:04 AM    Patient Name Carol Collins   Appointment Department [facility] PROP WEIGHT MANAGEMENT  Visit Type Audio (Virtual visit)    Clinic Weights   Wt Readings from Last 3 Encounters:   09/06/24 1246 96.2 kg (212 lb)   09/06/24 1352 97.3 kg (214 lb 6.4 oz)   06/21/24 0950 95.7 kg (211 lb)     Last Reported Weights No data was found      [facility] INTERVENTION CONTACT:   Method of contact:  Phone Call   or Participant-Initiated Contact?:  -initiated contact  Type of intervention Contact:  Scheduled Session  Did the participant attend session?: Yes    Was the patient called within 15 minutes of the scheduled appointment?:  Yes  Is this a make-up session?: No    Which session materials covered in session?:  Session 28  Patient Reported Weight (From External Aria):  210  Time workout: not specified.  Average daily steps per day via Fitbit or activity tracker measurement::  30130  Calorie Prescription::  1600  Safety Criteria Triggered:  None  Toolbox Triggered:  None  Weight Graph Stoplight Status for Dietary Adherence:  Green Light       Goals    None          Additional Notes:    Patient reports doing well overall and is pleased to see her weight trending down. She attributes to eating pre-portioned meals and walking 10,000 steps/day.     She explained that she purchased a walking pad over the last month which has made it possible for her to achieve her 10K steps goal/daily. Having two pre-portioned meals/day currently rather than one because of convenience and the progress she sees while doing this.     Discussed mindful eating. She identified many of the things on the list that she is not doing because of her lifestyle and already established healthy habits.     Goals:   1) Continue following 1,600 calorie diet, utilizing pre-portioned meals  2) Walking 10,000 steps/day    Arianne Novoa [facility]  [employer] [facility]   [phone]

## 2024-09-14 ENCOUNTER — PATIENT MESSAGE (OUTPATIENT)
Dept: INTERNAL MEDICINE | Facility: CLINIC | Age: 59
End: 2024-09-14
Payer: COMMERCIAL

## 2024-09-18 ENCOUNTER — PATIENT MESSAGE (OUTPATIENT)
Dept: GASTROENTEROLOGY | Facility: CLINIC | Age: 59
End: 2024-09-18
Payer: COMMERCIAL

## 2024-09-18 ENCOUNTER — ANESTHESIA EVENT (OUTPATIENT)
Dept: ENDOSCOPY | Facility: HOSPITAL | Age: 59
End: 2024-09-18
Payer: COMMERCIAL

## 2024-09-18 NOTE — ANESTHESIA PREPROCEDURE EVALUATION
09/18/2024  Carol Collins is a 59 y.o., female.      Pre-op Assessment    I have reviewed the Patient Summary Reports.    I have reviewed the NPO Status.   I have reviewed the Medications.     Review of Systems  Anesthesia Hx:             Denies Family Hx of Anesthesia complications.    Denies Personal Hx of Anesthesia complications.                    Hematology/Oncology:  Hematology Normal   Oncology Normal                                   EENT/Dental:  EENT/Dental Normal           Cardiovascular:  Cardiovascular Normal                                            Pulmonary:  Pulmonary Normal                       Renal/:  Renal/ Normal                 Hepatic/GI:    Hiatal Hernia, GERD             Musculoskeletal:  Musculoskeletal Normal                Neurological:    Neuromuscular Disease,                                   Endocrine:  Endocrine Normal            Dermatological:  Skin Normal    Psych:  Psychiatric Normal              Procedure: COLONOSCOPY (N/A)         Patient Active Problem List   Diagnosis    Gastroesophageal reflux disease without esophagitis    Heart palpitations    Anxiety    Hiatal hernia    Schatzki's ring    Hormone replacement therapy (HRT)    History of hypertension - off amlodipine since 1/2023    Vitamin D deficiency    Research subject       Past Medical History:   Diagnosis Date    GERD (gastroesophageal reflux disease)     Heart palpitations 5/2/2018       ECHO: Results for orders placed during the hospital encounter of 09/06/22    Echo    Interpretation Summary  · The left ventricle is normal in size with normal systolic function. The estimated ejection fraction is 60%.  · Normal right ventricular size with normal right ventricular systolic function.  · Normal left ventricular diastolic function.  · The estimated PA systolic pressure is 22 mmHg.  · Normal  central venous pressure (3 mmHg).      There is no height or weight on file to calculate BMI.    Tobacco Use: Low Risk  (9/6/2024)    Patient History     Smoking Tobacco Use: Never     Smokeless Tobacco Use: Never     Passive Exposure: Not on file       Social History     Substance and Sexual Activity   Drug Use No        Alcohol Use: Not At Risk (11/17/2023)    AUDIT-C     Frequency of Alcohol Consumption: 2-4 times a month     Average Number of Drinks: 1 or 2     Frequency of Binge Drinking: Never       Review of patient's allergies indicates:   Allergen Reactions    Grass pollen-june grass standard     House dust Other (See Comments)         Airway:  No value filed.     Physical Exam  General: Well nourished    Airway:  Mallampati: II   Mouth Opening: Normal  TM Distance: Normal    Chest/Lungs:  Normal Respiratory Rate    Heart:  Rate: Normal    Anesthesia Plan  Type of Anesthesia, risks & benefits discussed:    Anesthesia Type: Gen Natural Airway  Intra-op Monitoring Plan: Standard ASA Monitors  Induction:  IV  Informed Consent: Informed consent signed with the Patient and all parties understand the risks and agree with anesthesia plan.  All questions answered. Patient consented to blood products? No  ASA Score: 2  Day of Surgery Review of History & Physical: H&P Update referred to the surgeon/provider.    Ready For Surgery From Anesthesia Perspective.     .

## 2024-09-19 ENCOUNTER — TELEPHONE (OUTPATIENT)
Dept: ENDOSCOPY | Facility: HOSPITAL | Age: 59
End: 2024-09-19
Payer: COMMERCIAL

## 2024-09-19 NOTE — TELEPHONE ENCOUNTER
Called to confirm colonscopy for 9.20; all ques answered; instr resent via email [email or web address]

## 2024-09-20 ENCOUNTER — HOSPITAL ENCOUNTER (OUTPATIENT)
Facility: HOSPITAL | Age: 59
Discharge: HOME | End: 2024-09-20
Attending: INTERNAL MEDICINE | Admitting: INTERNAL MEDICINE
Payer: COMMERCIAL

## 2024-09-20 ENCOUNTER — ANESTHESIA (OUTPATIENT)
Dept: ENDOSCOPY | Facility: HOSPITAL | Age: 59
End: 2024-09-20
Payer: COMMERCIAL

## 2024-09-20 VITALS
SYSTOLIC BLOOD PRESSURE: 144 MMHG | RESPIRATION RATE: 12 BRPM | TEMPERATURE: 97 F | HEART RATE: 61 BPM | BODY MASS INDEX: 35.16 KG/M2 | DIASTOLIC BLOOD PRESSURE: 75 MMHG | HEIGHT: 65 IN | OXYGEN SATURATION: 100 % | WEIGHT: 211 LBS

## 2024-09-20 DIAGNOSIS — Z86.010 HISTORY OF COLON POLYPS: Primary | ICD-10-CM

## 2024-09-20 PROCEDURE — 25000003 PHARM REV CODE 250: Performed by: NURSE ANESTHETIST, CERTIFIED REGISTERED

## 2024-09-20 PROCEDURE — 45385 COLONOSCOPY W/LESION REMOVAL: CPT | Mod: 33,,, | Performed by: INTERNAL MEDICINE

## 2024-09-20 PROCEDURE — 99900035 HC TECH TIME PER 15 MIN (STAT)

## 2024-09-20 PROCEDURE — 27201089 HC SNARE, DISP (ANY): Performed by: INTERNAL MEDICINE

## 2024-09-20 PROCEDURE — 37000009 HC ANESTHESIA EA ADD 15 MINS: Performed by: INTERNAL MEDICINE

## 2024-09-20 PROCEDURE — 45385 COLONOSCOPY W/LESION REMOVAL: CPT | Mod: 33 | Performed by: INTERNAL MEDICINE

## 2024-09-20 PROCEDURE — 94761 N-INVAS EAR/PLS OXIMETRY MLT: CPT

## 2024-09-20 PROCEDURE — 63600175 PHARM REV CODE 636 W HCPCS: Performed by: NURSE ANESTHETIST, CERTIFIED REGISTERED

## 2024-09-20 PROCEDURE — 88305 TISSUE EXAM BY PATHOLOGIST: CPT | Performed by: STUDENT IN AN ORGANIZED HEALTH CARE EDUCATION/TRAINING PROGRAM

## 2024-09-20 PROCEDURE — 88305 TISSUE EXAM BY PATHOLOGIST: CPT | Mod: 26,,, | Performed by: STUDENT IN AN ORGANIZED HEALTH CARE EDUCATION/TRAINING PROGRAM

## 2024-09-20 PROCEDURE — 37000008 HC ANESTHESIA 1ST 15 MINUTES: Performed by: INTERNAL MEDICINE

## 2024-09-20 PROCEDURE — 25000003 PHARM REV CODE 250: Performed by: INTERNAL MEDICINE

## 2024-09-20 RX ORDER — SODIUM CHLORIDE 9 MG/ML
INJECTION, SOLUTION INTRAVENOUS CONTINUOUS
Status: DISCONTINUED | OUTPATIENT
Start: 2024-09-20 | End: 2024-09-20 | Stop reason: HOSPADM

## 2024-09-20 RX ORDER — PROPOFOL 10 MG/ML
VIAL (ML) INTRAVENOUS
Status: DISCONTINUED | OUTPATIENT
Start: 2024-09-20 | End: 2024-09-20

## 2024-09-20 RX ORDER — LIDOCAINE HYDROCHLORIDE 20 MG/ML
INJECTION INTRAVENOUS
Status: DISCONTINUED | OUTPATIENT
Start: 2024-09-20 | End: 2024-09-20

## 2024-09-20 RX ADMIN — PROPOFOL 80 MG: 10 INJECTION, EMULSION INTRAVENOUS at 08:09

## 2024-09-20 RX ADMIN — SODIUM CHLORIDE: 0.9 INJECTION, SOLUTION INTRAVENOUS at 08:09

## 2024-09-20 RX ADMIN — LIDOCAINE HYDROCHLORIDE 100 MG: 20 INJECTION INTRAVENOUS at 08:09

## 2024-09-20 NOTE — H&P
Short Stay Endoscopy History and Physical    PCP - Chiquis Messina MD    Procedure - Colonoscopy  Sedation: GA  ASA - per anesthesia  Mallampati - per anesthesia  History of Anesthesia problems - no  Family history Anesthesia problems -  no     HPI:  This is a 59 y.o. female here for evaluation of : History of colon polyps    Reflux - no  Dysphagia - no  Abdominal pain - no  Diarrhea - no    ROS:  Constitutional: No fevers, chills, No weight loss  ENT: No allergies  CV: No chest pain  Pulm: No cough, No shortness of breath  Ophtho: No vision changes  GI: see HPI  Medical History:  has a past medical history of GERD (gastroesophageal reflux disease) and Heart palpitations (2018).    Surgical History:  has a past surgical history that includes  section, classic (); Hysterectomy (); lumpectomy (Right, ); Breast biopsy (Left, ); and Colonoscopy (N/A, 2024).    Family History: family history includes Breast cancer in an other family member; Breast cancer (age of onset: 52) in her sister; Breast cancer (age of onset: 60) in her sister; Cancer in her cousin, cousin, and cousin; Diabetes in her brother, mother, and sister; Hypertension in her father, mother, sister, sister, and sister; No Known Problems in her son and son; Ovarian cancer (age of onset: 73) in her mother; Vulvar Cancer in her sister.. Otherwise no colon cancer, inflammatory bowel disease, or GI malignancies.    Social History:  reports that she has never smoked. She has never used smokeless tobacco. She reports current alcohol use. She reports that she does not use drugs.    Review of patient's allergies indicates:   Allergen Reactions    Grass pollen- grass standard     House dust Other (See Comments)       Medications:   Medications Prior to Admission   Medication Sig Dispense Refill Last Dose    COVID-19 (COMIRNATY -, 12Y UP,,PF,) 30 mcg/0.3 mL IM vaccine (>/= 11 yo) Inject into the muscle. 0.3 mL 0      docusate sodium (COLACE) 100 MG capsule Take 1 capsule (100 mg total) by mouth 2 (two) times daily.       GAVILYTE-C 240-22.72-6.72 -5.84 gram SolR TAKE 4000 ML BY MOUTH 1 TIME FOR 1 DOSE       GAVILYTE-G 236-22.74-6.74 -5.86 gram suspension SMARTSIG:Milliliter(s) By Mouth       polyethylene glycol (COLYTE) 240-22.72-6.72 -5.84 gram SolR Please follow the instructions from the provider office. 8000 mL 0     vitamin D (VITAMIN D3) 1000 units Tab Take 1,000 Units by mouth once daily.          Objective Findings:    Vital Signs: Per nursing notes.    Physical Exam:  General Appearance: Well appearing in no acute distress  Head:   Normocephalic, without obvious abnormality  Eyes:    No scleral icterus  Airway: Open  Neck: No restriction in mobility  Lungs: CTA bilaterally in anterior and posterior fields, no wheezes, no crackles.  Heart:  Regular rate and rhythm, S1, S2 normal, no murmurs heard  Abdomen: Soft, non tender, non distended      Labs:  Lab Results   Component Value Date    WBC 3.36 (L) 08/28/2024    HGB 14.4 08/28/2024    HCT 45.5 08/28/2024     08/28/2024    CHOL 205 (H) 08/28/2024    TRIG 76 08/28/2024    HDL 49 08/28/2024    ALT 21 08/28/2024    AST 21 08/28/2024     08/28/2024    K 4.5 08/28/2024     08/28/2024    CREATININE 0.9 08/28/2024    BUN 10 08/28/2024    CO2 27 08/28/2024    TSH 1.309 08/28/2024    HGBA1C 5.5 08/28/2024         I have explained the risks and benefits of endoscopy procedures to the patient including but not limited to bleeding, perforation, infection, and death.    Thank you so much for allowing me to participate in the care of Carol Emmanuel MD

## 2024-09-20 NOTE — TRANSFER OF CARE
"Anesthesia Transfer of Care Note    Patient: Carol Collins    Procedure(s) Performed: Procedure(s) (LRB):  COLONOSCOPY (N/A)    Patient location: PACU    Anesthesia Type: general    Transport from OR: Transported from OR on room air with adequate spontaneous ventilation    Post pain: adequate analgesia    Post assessment: no apparent anesthetic complications and tolerated procedure well    Post vital signs: stable    Level of consciousness: awake and lethargic    Nausea/Vomiting: no nausea/vomiting    Complications: none    Transfer of care protocol was followed      Last vitals: Visit Vitals  /67   Pulse (!) 59   Temp 36.5 °C (97.7 °F) (Temporal)   Resp 16   Ht 5' 5" (1.651 m)   Wt 95.7 kg (211 lb)   SpO2 98%   Breastfeeding No   BMI 35.11 kg/m²     "

## 2024-09-20 NOTE — PROVATION PATIENT INSTRUCTIONS
Discharge Summary/Instructions after an Endoscopic Procedure  Patient Name: Carol Collins  Patient MRN: [id number]  Patient YOB: 1965  Friday, September 20, 2024  Jm Emmanuel MD  Dear patient,  As a result of recent federal legislation (The Federal Cures Act), you may   receive lab or pathology results from your procedure in your [facility]   account before your physician is able to contact you. Your physician or   their representative will relay the results to you with their   recommendations at their soonest availability.  Thank you,  RESTRICTIONS:  During your procedure today, you received medications for sedation.  These   medications may affect your judgment, balance and coordination.  Therefore,   for 24 hours, you have the following restrictions:   - DO NOT drive a car, operate machinery, make legal/financial decisions,   sign important papers or drink alcohol.    ACTIVITY:  Today: no heavy lifting, straining or running due to procedural   sedation/anesthesia.  The following day: return to full activity including work.  DIET:  Eat and drink normally unless instructed otherwise.     TREATMENT FOR COMMON SIDE EFFECTS:  - Mild abdominal pain, nausea, belching, bloating or excessive gas:  rest,   eat lightly and use a heating pad.  - Sore Throat: treat with throat lozenges and/or gargle with warm salt   water.  - Because air was used during the procedure, expelling large amounts of air   from your rectum or belching is normal.  - If a bowel prep was taken, you may not have a bowel movement for 1-3 days.    This is normal.  SYMPTOMS TO WATCH FOR AND REPORT TO YOUR PHYSICIAN:  1. Abdominal pain or bloating, other than gas cramps.  2. Chest pain.  3. Back pain.  4. Signs of infection such as: chills or fever occurring within 24 hours   after the procedure.  5. Rectal bleeding, which would show as bright red, maroon, or black stools.   (A tablespoon of blood from the rectum is not serious, especially  if   hemorrhoids are present.)  6. Vomiting.  7. Weakness or dizziness.  GO DIRECTLY TO THE NEAREST EMERGENCY ROOM IF YOU HAVE ANY OF THE FOLLOWING:      Difficulty breathing              Chills and/or fever over 101 F   Persistent vomiting and/or vomiting blood   Severe abdominal pain   Severe chest pain   Black, tarry stools   Bleeding- more than one tablespoon   Any other symptom or condition that you feel may need urgent attention  Your doctor recommends these additional instructions:  If any biopsies were taken, your doctors clinic will contact you in 1 to 2   weeks with any results.  - Patient has a contact number available for emergencies.  The signs and   symptoms of potential delayed complications were discussed with the   patient.  Return to normal activities tomorrow.  Written discharge   instructions were provided to the patient.   - Discharge patient to home.   - Resume previous diet.   - Continue present medications.   - Await pathology results.   - Repeat colonoscopy in 5 years for surveillance.   For questions, problems or results please call your physician - Jm Emmanuel MD at Work:  [phone].  [facility] [place] [facility], EMERGENCY ROOM PHONE NUMBER: [phone]  IF A COMPLICATION OR EMERGENCY SITUATION ARISES AND YOU ARE UNABLE TO REACH   YOUR PHYSICIAN - GO DIRECTLY TO THE EMERGENCY ROOM.  Jm Emmanuel MD  9/20/2024 8:56:31 AM  This report has been verified and signed electronically.  Dear patient,  As a result of recent federal legislation (The Federal Cures Act), you may   receive lab or pathology results from your procedure in your [facility]   account before your physician is able to contact you. Your physician or   their representative will relay the results to you with their   recommendations at their soonest availability.  Thank you,  [employer]ATION

## 2024-09-20 NOTE — ANESTHESIA POSTPROCEDURE EVALUATION
Anesthesia Post Evaluation    Patient: Carol Collins    Procedure(s) Performed: Procedure(s) (LRB):  COLONOSCOPY (N/A)    Final Anesthesia Type: general      Patient location during evaluation: PACU  Patient participation: Yes- Able to Participate  Level of consciousness: awake and alert  Post-procedure vital signs: reviewed and stable  Pain management: adequate  Airway patency: patent    PONV status at discharge: No PONV  Anesthetic complications: no      Cardiovascular status: stable  Respiratory status: spontaneous ventilation  Hydration status: euvolemic  Follow-up not needed.          Vitals Value Taken Time   /75 09/20/24 0917   Temp 36.2 °C (97.2 °F) 09/20/24 0855   Pulse 59 09/20/24 0920   Resp 19 09/20/24 0919   SpO2 94 % 09/20/24 0920   Vitals shown include unfiled device data.      Event Time   Out of Recovery 09:10:00         Pain/Miguel Score: Miguel Score: 10 (9/20/2024  9:10 AM)

## 2024-09-23 ENCOUNTER — TELEPHONE (OUTPATIENT)
Dept: GASTROENTEROLOGY | Facility: CLINIC | Age: 59
End: 2024-09-23
Payer: COMMERCIAL

## 2024-09-23 LAB
FINAL PATHOLOGIC DIAGNOSIS: NORMAL
GROSS: NORMAL
Lab: NORMAL
MICROSCOPIC EXAM: NORMAL

## 2024-09-23 NOTE — TELEPHONE ENCOUNTER
----- Message from Jm Emmanuel MD sent at 9/23/2024  2:08 PM CDT -----  Please call and notify patient, the colon polyp was benign.

## 2024-10-07 ENCOUNTER — PATIENT MESSAGE (OUTPATIENT)
Dept: INTERNAL MEDICINE | Facility: CLINIC | Age: 59
End: 2024-10-07
Payer: COMMERCIAL

## 2024-10-16 ENCOUNTER — PATIENT MESSAGE (OUTPATIENT)
Dept: ADMINISTRATIVE | Facility: HOSPITAL | Age: 59
End: 2024-10-16
Payer: COMMERCIAL

## 2024-10-17 ENCOUNTER — PATIENT OUTREACH (OUTPATIENT)
Dept: INTERNAL MEDICINE | Facility: CLINIC | Age: 59
End: 2024-10-17

## 2024-10-18 ENCOUNTER — PATIENT OUTREACH (OUTPATIENT)
Dept: RESEARCH | Facility: CLINIC | Age: 59
End: 2024-10-18
Payer: COMMERCIAL

## 2024-10-18 NOTE — PROGRESS NOTES
10/18/2024  8:38 AM    Patient Name Carol Collins   Appointment Department [facility] PROP WEIGHT MANAGEMENT  Visit Type Audio (Virtual visit)    Clinic Weights   Wt Readings from Last 3 Encounters:   09/20/24 0746 95.7 kg (211 lb)   09/06/24 1246 96.2 kg (212 lb)   09/06/24 1352 97.3 kg (214 lb 6.4 oz)     Last Reported Weights No data was found      [facility] INTERVENTION CONTACT:   Method of contact:  Phone Call   or Participant-Initiated Contact?:  -initiated contact  Type of intervention Contact:  Scheduled Session  Did the participant attend session?: Yes    Was the patient called within 15 minutes of the scheduled appointment?:  Yes  Is this a make-up session?: No    Which session materials covered in session?:  Session 29  Patient Reported Weight (From External Aria):  210  Time workout: not specified.  Average daily steps per day via Fitbit or activity tracker measurement::  9000  Calorie Prescription::  1600  Safety Criteria Triggered:  None  Toolbox Triggered:  None  Weight Graph Stoplight Status for Dietary Adherence:  Green Light       Goals    None          Additional Notes:    Reports doing well overall and denies major concerns. Consistently walking between 8,000-10,000 steps daily and now aiming to hit 10,000 steps daily. Agrees this will promote her weight loss again (opposed to maintenance).     She recently got a new oven and is excited to start baking again and will be cooking for her family for [employer].     Walking inside w/walking pad but excited about time change because she would like to walk outside in the mornings before work (will be daylight).     Discussed weight loss maintenance and healthy substitutions.    Goals:   1) 10,000 steps goal/daily  2) Continue to follow 1,600 calorie goal    Arianne Novoa [facility]  [employer] [facility]   [phone]

## 2024-11-15 ENCOUNTER — PATIENT OUTREACH (OUTPATIENT)
Dept: RESEARCH | Facility: CLINIC | Age: 59
End: 2024-11-15
Payer: COMMERCIAL

## 2024-11-15 NOTE — PROGRESS NOTES
"11/15/2024  9:23 AM    Patient Name Carol Collins   Appointment Department [facility] PROP WEIGHT MANAGEMENT  Visit Type Audio (Virtual visit)    Clinic Weights   Wt Readings from Last 3 Encounters:   09/20/24 0746 95.7 kg (211 lb)   09/06/24 1246 96.2 kg (212 lb)   09/06/24 1352 97.3 kg (214 lb 6.4 oz)     Last Reported Weights No data was found      [facility] INTERVENTION CONTACT:   Method of contact:  Phone Call   or Participant-Initiated Contact?:  -initiated contact  Type of intervention Contact:  Scheduled Session  Did the participant attend session?: Yes    Was the patient called within 15 minutes of the scheduled appointment?:  Yes  Is this a make-up session?: No    Which session materials covered in session?:  Session 30  Patient Reported Weight (From External Aria):  211  Time workout: not specified.  Average daily steps per day via Fitbit or activity tracker measurement::  64937  Calorie Prescription::  1600  Safety Criteria Triggered:  None  Toolbox Triggered:  None  Weight Graph Stoplight Status for Dietary Adherence:  Green Light       Goals    None          Additional Notes:    Patient reports doing well overall. She is pleased with weight loss maintenance despite being "off track" & having house guests this month. She also relocated her walking pad to another room in the house and is excited that it will promote her to reach her step goal every day.     Discussed possibility of taking a weight loss medication to help continue weight loss progress. Will consult with her doctor about this, possibly after new year.     Focus on strength training & toning exercises in addition to walking this month.     Return to using pre-portioned/prepared meals for calorie adherence.     Goals:   1) 10,000 daily step goal  2) Pre-portioned/prepared meal replacements  3) Focus on strength training & toning exercises    Arianne Novoa [facility]  [employer] [facility]   [phone]          "

## 2024-11-28 ENCOUNTER — PATIENT MESSAGE (OUTPATIENT)
Dept: INTERNAL MEDICINE | Facility: CLINIC | Age: 59
End: 2024-11-28
Payer: COMMERCIAL

## 2024-12-05 ENCOUNTER — PATIENT MESSAGE (OUTPATIENT)
Dept: INTERNAL MEDICINE | Facility: CLINIC | Age: 59
End: 2024-12-05
Payer: COMMERCIAL

## 2024-12-18 VITALS — DIASTOLIC BLOOD PRESSURE: 79 MMHG | SYSTOLIC BLOOD PRESSURE: 149 MMHG

## 2024-12-20 ENCOUNTER — PATIENT OUTREACH (OUTPATIENT)
Dept: RESEARCH | Facility: CLINIC | Age: 59
End: 2024-12-20
Payer: COMMERCIAL

## 2024-12-20 ENCOUNTER — OFFICE VISIT (OUTPATIENT)
Dept: INTERNAL MEDICINE | Facility: CLINIC | Age: 59
End: 2024-12-20
Payer: COMMERCIAL

## 2024-12-20 VITALS
HEART RATE: 65 BPM | SYSTOLIC BLOOD PRESSURE: 130 MMHG | DIASTOLIC BLOOD PRESSURE: 64 MMHG | BODY MASS INDEX: 36.07 KG/M2 | WEIGHT: 216.5 LBS | HEIGHT: 65 IN | OXYGEN SATURATION: 97 %

## 2024-12-20 DIAGNOSIS — Z86.79 HISTORY OF HYPERTENSION: ICD-10-CM

## 2024-12-20 DIAGNOSIS — Z00.00 HEALTH CARE MAINTENANCE: ICD-10-CM

## 2024-12-20 DIAGNOSIS — E55.9 VITAMIN D DEFICIENCY: Primary | ICD-10-CM

## 2024-12-20 PROCEDURE — 1160F RVW MEDS BY RX/DR IN RCRD: CPT | Mod: CPTII,S$GLB,, | Performed by: INTERNAL MEDICINE

## 2024-12-20 PROCEDURE — 3044F HG A1C LEVEL LT 7.0%: CPT | Mod: CPTII,S$GLB,, | Performed by: INTERNAL MEDICINE

## 2024-12-20 PROCEDURE — 3075F SYST BP GE 130 - 139MM HG: CPT | Mod: CPTII,S$GLB,, | Performed by: INTERNAL MEDICINE

## 2024-12-20 PROCEDURE — 3066F NEPHROPATHY DOC TX: CPT | Mod: CPTII,S$GLB,, | Performed by: INTERNAL MEDICINE

## 2024-12-20 PROCEDURE — 3008F BODY MASS INDEX DOCD: CPT | Mod: CPTII,S$GLB,, | Performed by: INTERNAL MEDICINE

## 2024-12-20 PROCEDURE — 99999 PR PBB SHADOW E&M-EST. PATIENT-LVL III: CPT | Mod: PBBFAC,,, | Performed by: INTERNAL MEDICINE

## 2024-12-20 PROCEDURE — 1159F MED LIST DOCD IN RCRD: CPT | Mod: CPTII,S$GLB,, | Performed by: INTERNAL MEDICINE

## 2024-12-20 PROCEDURE — 3078F DIAST BP <80 MM HG: CPT | Mod: CPTII,S$GLB,, | Performed by: INTERNAL MEDICINE

## 2024-12-20 PROCEDURE — 3061F NEG MICROALBUMINURIA REV: CPT | Mod: CPTII,S$GLB,, | Performed by: INTERNAL MEDICINE

## 2024-12-20 PROCEDURE — 99214 OFFICE O/P EST MOD 30 MIN: CPT | Mod: S$GLB,,, | Performed by: INTERNAL MEDICINE

## 2024-12-20 PROCEDURE — G2211 COMPLEX E/M VISIT ADD ON: HCPCS | Mod: S$GLB,,, | Performed by: INTERNAL MEDICINE

## 2024-12-20 NOTE — PROGRESS NOTES
12/20/2024  8:55 AM    Patient Name Carol Collins   Appointment Department [facility] WEIGHT MANAGEMENT  Visit Type Audio (Virtual visit)    Clinic Weights   Wt Readings from Last 3 Encounters:   09/20/24 0746 95.7 kg (211 lb)   09/06/24 1246 96.2 kg (212 lb)   09/06/24 1352 97.3 kg (214 lb 6.4 oz)     Last Reported Weights No data was found      [facility] PROP INTERVENTION CONTACT:   Method of contact:  Phone Call   or Participant-Initiated Contact?:  -initiated contact  Type of intervention Contact:  Scheduled Session  Did the participant attend session?: Yes    Was the patient called within 15 minutes of the scheduled appointment?:  Yes  Is this a make-up session?: No    Which session materials covered in session?:  Session 31  Patient Reported Weight (From External Aria):  213  Time workout: not specified.  Average daily steps per day via Fitbit or activity tracker measurement::  71220  Calorie Prescription::  1600  Safety Criteria Triggered:  None  Toolbox Triggered:  None  Weight Graph Stoplight Status for Dietary Adherence:  Green Light       Goals    None          Additional Notes:    Patient reports doing well today and reflects on her one-year anniversary with Ramos. Compared to last year, she is not only lighter, but physically feels healthier and better overall. Has maintained her weight loss for 6 months and would ideally like to continue her weight  loss progress over the next year.     Motivation to lose a little more weight by son's wedding in April 2025. Consultation for GLP-1 medication later this month.     Walking <10,000 steps/day and plans to continue into next year. Purchased a standing desk to help this.     Goals:   1) Continue step goal - 10,000/day  2) Adding variety to exercise from binder - weights/toning & chair exercise    Arianne Novoa, [facility]  [employer] [facility]   [phone]

## 2024-12-20 NOTE — PROGRESS NOTES
Subjective:       Patient ID: Carol Collins is a 59 y.o. female.    Chief Complaint: Hypertension and Follow-up    Hypertension  This is a recurrent problem. The current episode started more than 1 year ago. The problem has been gradually worsening since onset. The problem is uncontrolled. Pertinent negatives include no anxiety, blurred vision, chest pain, headaches, malaise/fatigue, neck pain, orthopnea, palpitations, peripheral edema, PND, shortness of breath or sweats. There are no associated agents to hypertension. Risk factors for coronary artery disease include obesity and post-menopausal state. Past treatments include ACE inhibitors and lifestyle changes. The current treatment provides moderate improvement. There are no compliance problems.      59 y.o. female here for follow up of    The 10-year ASCVD risk score (Olive DK, et al., 2019) is: 5.9%    Values used to calculate the score:      Age: 59 years      Sex: Female      Is Non- : Yes      Diabetic: No      Tobacco smoker: No      Systolic Blood Pressure: 130 mmHg      Is BP treated: No      HDL Cholesterol: 49 mg/dL      Total Cholesterol: 205 mg/dL    Elevated BP  Monitoring, off meds  Between 130/70 to 156/80.     Portal msg:  For the past week I have had recurring left leg pain after I sit for a while. It goes away when I stand or walk but it always comes back. For the last 2 or 3 days I have begun to experience tingling and numbness in my left foot, especially my big toe.     Thinks her work chair is contributing. Switching to standing desk soon. Big toe numbness on occasion. Walking on treadmill which helps overall. No weakness. No bladder nor bladder incontinence.  Constipation better since backing off on metamucil.     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     No personal or family hx of pancreatitis nor MEN syndrome.  Niece had thyroid cancer, unknown type.   Considering  "glp agonist - would need to find out what type of ca to ensure not contraindication.   Review of Systems   Constitutional:  Negative for fever and malaise/fatigue.   Eyes:  Negative for blurred vision.   Respiratory:  Negative for shortness of breath.    Cardiovascular:  Negative for chest pain, palpitations, orthopnea and PND.   Musculoskeletal: Negative.  Negative for neck pain.   Skin: Negative.    Neurological:  Negative for headaches.       Objective:   /64 (BP Location: Right arm, Patient Position: Sitting)   Pulse 65   Ht 5' 5" (1.651 m)   Wt 98.2 kg (216 lb 7.9 oz)   SpO2 97%   BMI 36.03 kg/m²      Physical Exam  Constitutional:       General: She is not in acute distress.     Appearance: She is well-developed. She is not diaphoretic.   HENT:      Head: Normocephalic and atraumatic.   Cardiovascular:      Rate and Rhythm: Normal rate and regular rhythm.   Pulmonary:      Effort: Pulmonary effort is normal. No respiratory distress.      Breath sounds: No wheezing or rales.   Skin:     General: Skin is warm and dry.   Neurological:      Mental Status: She is alert and oriented to person, place, and time.   Psychiatric:         Behavior: Behavior normal.         Assessment:       1. Vitamin D deficiency    2. History of hypertension - off amlodipine since 1/2023    3. BMI 36.0-36.9,adult    4. Health care maintenance        Plan:       Lumbar neuritis  Working on ergonomics for work station  Discussed possible PT    Vitamin D deficiency  - stable and controlled  - continue current regimen    History of hypertension - off amlodipine since 1/2023  In acceptable range, monitor    BMI 36.0-36.9,adult  Considering zepbound/wegovy  Will look in to insurance coverage  Would need to verify family thyroid ca hx    Health care maintenance  -     CBC Auto Differential; Future; Expected date: 06/22/2025  -     Comprehensive Metabolic Panel; Future; Expected date: 06/22/2025  -     Hemoglobin A1C; Future; " Expected date: 06/18/2025  -     Lipid Panel; Future; Expected date: 06/22/2025  -     TSH; Future; Expected date: 06/22/2025  Annual in August w labs prior        You are up to date for your primary preventive health care, and there are no reminders at this time.

## 2024-12-23 ENCOUNTER — PATIENT MESSAGE (OUTPATIENT)
Dept: INTERNAL MEDICINE | Facility: CLINIC | Age: 59
End: 2024-12-23
Payer: COMMERCIAL

## 2024-12-23 DIAGNOSIS — I10 HYPERTENSION, ESSENTIAL: ICD-10-CM

## 2024-12-30 RX ORDER — TIRZEPATIDE 5 MG/.5ML
5 INJECTION, SOLUTION SUBCUTANEOUS
Qty: 2 ML | Refills: 5 | Status: SHIPPED | OUTPATIENT
Start: 2025-01-27

## 2024-12-30 RX ORDER — TIRZEPATIDE 2.5 MG/.5ML
2.5 INJECTION, SOLUTION SUBCUTANEOUS
Qty: 2 ML | Refills: 0 | Status: SHIPPED | OUTPATIENT
Start: 2024-12-30

## 2025-01-13 ENCOUNTER — PATIENT MESSAGE (OUTPATIENT)
Dept: INTERNAL MEDICINE | Facility: CLINIC | Age: 60
End: 2025-01-13
Payer: COMMERCIAL

## 2025-01-16 ENCOUNTER — PATIENT MESSAGE (OUTPATIENT)
Dept: DERMATOLOGY | Facility: CLINIC | Age: 60
End: 2025-01-16
Payer: COMMERCIAL

## 2025-01-17 ENCOUNTER — PATIENT OUTREACH (OUTPATIENT)
Dept: RESEARCH | Facility: CLINIC | Age: 60
End: 2025-01-17
Payer: COMMERCIAL

## 2025-01-17 NOTE — PROGRESS NOTES
01/17/2025  9:25 AM    Patient Name Carol Collins   Appointment Department [facility] PROP WEIGHT MANAGEMENT  Visit Type Audio (Virtual visit)    Clinic Weights   Wt Readings from Last 3 Encounters:   12/20/24 1056 98.2 kg (216 lb 7.9 oz)   09/20/24 0746 95.7 kg (211 lb)   09/06/24 1246 96.2 kg (212 lb)     Last Reported Weights No data was found      [facility] INTERVENTION CONTACT:   Method of contact:  Phone Call   or Participant-Initiated Contact?:  -initiated contact  Type of intervention Contact:  Scheduled Session  Did the participant attend session?: Yes    Was the patient called within 15 minutes of the scheduled appointment?:  Yes  Is this a make-up session?: No    Which session materials covered in session?:  Session 32  Patient Reported Weight (From External Aria):  213  Time workout: not specified.  Average daily steps per day via Fitbit or activity tracker measurement::  9000  Calorie Prescription::  1600  Safety Criteria Triggered:  None  Toolbox Triggered:  None  Weight Graph Stoplight Status for Dietary Adherence:  Green Light       Goals    None          Additional Notes:    Patient reports doing well overall and having a good start to 2025. Notes that she is pleased to be maintaining her weight loss through the holidays and getting back to her usual routine.     Planning to start using a standing desk soon and has a 9k daily step goal. Still using portion controlled meals for lunch and dinner.     Planning to start GLP-1 later this month to help her continue to lose weight. Would like to lose 10% of current body weight or 20-30 more pounds.     Goals:   1) Continue to follow 1,600 calore meal plan  2) 9,000 steps/day (average)    Arianne Novoa [facility]  [facility]   [phone]

## 2025-01-23 ENCOUNTER — PATIENT MESSAGE (OUTPATIENT)
Dept: INTERNAL MEDICINE | Facility: CLINIC | Age: 60
End: 2025-01-23
Payer: COMMERCIAL

## 2025-01-23 NOTE — TELEPHONE ENCOUNTER
LOV with Chiquis Messina MD , 12/20/2024    Patient requesting PA for Wegovy    Patient also requesting letter for medical necessity for health saving account

## 2025-02-14 ENCOUNTER — PATIENT OUTREACH (OUTPATIENT)
Dept: RESEARCH | Facility: CLINIC | Age: 60
End: 2025-02-14
Payer: COMMERCIAL

## 2025-02-14 NOTE — PROGRESS NOTES
02/14/2025  8:37 AM    Patient Name Carol Collins   Appointment Department [facility] PROP WEIGHT MANAGEMENT  Visit Type Audio (Virtual visit)    Clinic Weights   Wt Readings from Last 3 Encounters:   12/20/24 1056 98.2 kg (216 lb 7.9 oz)   09/20/24 0746 95.7 kg (211 lb)   09/06/24 1246 96.2 kg (212 lb)     Last Reported Weights No data was found      [facility] INTERVENTION CONTACT:   Method of contact:  Phone Call   or Participant-Initiated Contact?:  -initiated contact  Type of intervention Contact:  Scheduled Session  Did the participant attend session?: Yes    Was the patient called within 15 minutes of the scheduled appointment?:  Yes  Is this a make-up session?: No    Which session materials covered in session?:  Session 33  Patient Reported Weight (From External Aria):  213  Time workout: Estimated >150 min/week.  Average daily steps per day via Fitbit or activity tracker measurement::  8971  Calorie Prescription::  1600  Safety Criteria Triggered:  None  Toolbox Triggered:  None  Weight Graph Stoplight Status for Dietary Adherence:  Green Light       Goals    None          Additional Notes:    Patient reports doing well overall and is pleased with her weight loss maintenance. Did not start weight loss medication and intends to return to eating her frozen pre-portioned meals. Also, expecting to set up her standing desk soon which will help with calorie expenditure.     Started a core exercise video that she will continue to do daily as a part of her exercise routine.     Goals:   1) Following 1,600 calorie meal plan (using pre-portioned meal substitutions for lunch and dinner)  2) Addition of core exercise video to 9,000 daily step average    Arianne Novoa [facility]  [facility]   [phone]

## 2025-03-03 ENCOUNTER — OFFICE VISIT (OUTPATIENT)
Dept: DERMATOLOGY | Facility: CLINIC | Age: 60
End: 2025-03-03
Payer: COMMERCIAL

## 2025-03-03 DIAGNOSIS — L91.8 SKIN TAG: Primary | ICD-10-CM

## 2025-03-03 DIAGNOSIS — L82.1 DERMATOSIS PAPULOSA NIGRA: ICD-10-CM

## 2025-03-03 PROCEDURE — 1159F MED LIST DOCD IN RCRD: CPT | Mod: CPTII,S$GLB,, | Performed by: DERMATOLOGY

## 2025-03-03 PROCEDURE — 99999 PR PBB SHADOW E&M-EST. PATIENT-LVL III: CPT | Mod: PBBFAC,,, | Performed by: DERMATOLOGY

## 2025-03-03 PROCEDURE — 99213 OFFICE O/P EST LOW 20 MIN: CPT | Mod: S$GLB,,, | Performed by: DERMATOLOGY

## 2025-03-03 PROCEDURE — 1160F RVW MEDS BY RX/DR IN RCRD: CPT | Mod: CPTII,S$GLB,, | Performed by: DERMATOLOGY

## 2025-03-03 NOTE — PROGRESS NOTES
Subjective:      Patient ID:  Carol Collins is a 59 y.o. female who presents for   Chief Complaint   Patient presents with    Skin Check     Ubse      History of Present Illness: The patient presents for follow up of skin check.    The patient was last seen on: 10/17/22 for Notalgia paresthetica, Pt denies using any topicals or quad cream. Pt denies any itching.     No h/o of nmsc or mm.     Other skin complaints (Skin tags):   Patient with new area of concern:   Location: neck and under breast +itchy, irritated   Previous treatments: none       Review of Systems   Skin:  Positive for daily sunscreen use, activity-related sunscreen use and wears hat (occ). Negative for itching, rash and recent sunburn.   Hematologic/Lymphatic: Does not bruise/bleed easily.       Objective:   Physical Exam   Constitutional: She appears well-developed and well-nourished. No distress.   Neurological: She is alert and oriented to person, place, and time. She is not disoriented.   Psychiatric: She has a normal mood and affect.   Skin:   Areas Examined (abnormalities noted in diagram):   Head / Face Inspection Performed  Neck Inspection Performed  Chest / Axilla Inspection Performed                 Diagram Legend     Erythematous scaling macule/papule c/w actinic keratosis       Vascular papule c/w angioma      Pigmented verrucoid papule/plaque c/w seborrheic keratosis      Yellow umbilicated papule c/w sebaceous hyperplasia      Irregularly shaped tan macule c/w lentigo     1-2 mm smooth white papules consistent with Milia      Movable subcutaneous cyst with punctum c/w epidermal inclusion cyst      Subcutaneous movable cyst c/w pilar cyst      Firm pink to brown papule c/w dermatofibroma      Pedunculated fleshy papule(s) c/w skin tag(s)      Evenly pigmented macule c/w junctional nevus     Mildly variegated pigmented, slightly irregular-bordered macule c/w mildly atypical nevus      Flesh colored to evenly pigmented papule c/w  intradermal nevus       Pink pearly papule/plaque c/w basal cell carcinoma      Erythematous hyperkeratotic cursted plaque c/w SCC      Surgical scar with no sign of skin cancer recurrence      Open and closed comedones      Inflammatory papules and pustules      Verrucoid papule consistent consistent with wart     Erythematous eczematous patches and plaques     Dystrophic onycholytic nail with subungual debris c/w onychomycosis     Umbilicated papule    Erythematous-base heme-crusted tan verrucoid plaque consistent with inflamed seborrheic keratosis     Erythematous Silvery Scaling Plaque c/w Psoriasis     See annotation      Assessment / Plan:        Skin tags  - neck and inframammary   - minor problem and chronic.   Reassurance given to patient. No treatment necessary.   Treatment of benign, asymptomatic lesions may be considered cosmetic.      Dermatosis papulosa nigra - face and upper neck  Reassurance given to patient. No treatment is necessary.   Treatment of benign, asymptomatic lesions may be considered cosmetic.  These are benign inherited growths without a malignant potential.              No follow-ups on file.

## 2025-03-06 ENCOUNTER — OFFICE VISIT (OUTPATIENT)
Dept: INTERNAL MEDICINE | Facility: CLINIC | Age: 60
End: 2025-03-06
Payer: COMMERCIAL

## 2025-03-06 VITALS
HEIGHT: 65 IN | BODY MASS INDEX: 36.18 KG/M2 | HEART RATE: 61 BPM | SYSTOLIC BLOOD PRESSURE: 124 MMHG | WEIGHT: 217.13 LBS | OXYGEN SATURATION: 100 % | DIASTOLIC BLOOD PRESSURE: 84 MMHG

## 2025-03-06 DIAGNOSIS — E66.01 SEVERE OBESITY (BMI 35.0-39.9) WITH COMORBIDITY: Primary | ICD-10-CM

## 2025-03-06 PROCEDURE — 99999 PR PBB SHADOW E&M-EST. PATIENT-LVL III: CPT | Mod: PBBFAC,,, | Performed by: INTERNAL MEDICINE

## 2025-03-06 NOTE — PROGRESS NOTES
Subjective:       Patient ID: Carol Collins is a 59 y.o. female.    Chief Complaint: Follow-up    HPI  59 y.o. female here for follow up of    BMI 28  [facility] [employer]   Tirzepatide 2.5 then 5mg was ordered but not covered.     Message sent but not received by me to switch to wegovy as covered by insurance.     Wt Readings from Last 4 Encounters:   03/06/25 98.5 kg (217 lb 2.5 oz)   12/20/24 98.2 kg (216 lb 7.9 oz)   09/20/24 95.7 kg (211 lb)   09/06/24 96.2 kg (212 lb)       Walking more and leg is doing better now.       Review of Systems   Constitutional:  Negative for fever.   Respiratory:  Negative for shortness of breath.    Cardiovascular:  Negative for chest pain.   Musculoskeletal: Negative.    Skin: Negative.        Objective:        Physical Exam  Constitutional:       General: She is not in acute distress.     Appearance: She is well-developed. She is not diaphoretic.   HENT:      Head: Normocephalic and atraumatic.   Cardiovascular:      Rate and Rhythm: Normal rate and regular rhythm.   Pulmonary:      Effort: Pulmonary effort is normal. No respiratory distress.      Breath sounds: No wheezing or rales.   Skin:     General: Skin is warm and dry.   Neurological:      Mental Status: She is alert and oriented to person, place, and time.   Psychiatric:         Behavior: Behavior normal.         Assessment:       1. Severe obesity (BMI 35.0-39.9) with comorbidity        Plan:       Severe obesity (BMI 35.0-39.9) with comorbidity  Consider wegovy in future, defers for now    Constipation   Well controlled on prn docusate            You are up to date for your primary preventive health care, and there are no reminders at this time.     Consider wegovy in future, defers for now

## 2025-03-14 ENCOUNTER — PATIENT OUTREACH (OUTPATIENT)
Dept: RESEARCH | Facility: CLINIC | Age: 60
End: 2025-03-14
Payer: COMMERCIAL

## 2025-03-14 NOTE — PROGRESS NOTES
03/14/2025  8:43 AM    Patient Name Carol Collins   Appointment Department [facility] PROP WEIGHT MANAGEMENT  Visit Type Audio (Virtual visit)    Clinic Weights   Wt Readings from Last 3 Encounters:   03/06/25 1444 98.5 kg (217 lb 2.5 oz)   12/20/24 1056 98.2 kg (216 lb 7.9 oz)   09/20/24 0746 95.7 kg (211 lb)     Last Reported Weights No data was found      [facility] INTERVENTION CONTACT:   Method of contact:  Phone Call   or Participant-Initiated Contact?:  -initiated contact  Type of intervention Contact:  Scheduled Session  Did the participant attend session?: Yes    Was the patient called within 15 minutes of the scheduled appointment?:  Yes  Is this a make-up session?: No    Which session materials covered in session?:  Session 35 and Session 34  Patient Reported Weight (From External Aria):  211  Time workout: estimated >150 minutes, usually 60 mins/day.  Average Daily Steps: 9,500 - 12,000.  Calorie Prescription::  1600  Safety Criteria Triggered:  None  Toolbox Triggered:  None  Weight Graph Stoplight Status for Dietary Adherence:  Green Light       Goals    None          Additional Notes:    Patient reports doing well overall and is very busy with her work and preparing for her son's wedding next month. She notes that she is utilizing the portion controlled meals for now but plans to start preparing her own food after the wedding.     Feels like her exercise videos are making a difference in her core strength and physique. Would like to be more consistent - doing these every day leading up to the wedding.     Notes walking between 9,500 - 12,000 steps/day.     Goals:   1) Following 1,600 calorie meal plan (using pre-portioned meal substitutions for lunch and dinner)  2) 10 minute core exercise video after work     Arianne Novoa, [facility]  [facility]   [phone]     Subjective:       History was provided by the mother and father.  Whitney Barron is a 2 y.o. female here for evaluation of cough. Symptoms began 3 days ago. Cough is described as nonproductive and worsening over time. Associated symptoms include: left ear pain. Patient denies: chills, fever, nasal congestion, sore throat and wheezing. Patient has a history of otitis media and is s/p PET and T&A on June 7. Current treatments have included none, with no improvement. Patient denies having tobacco smoke exposure.    Review of Systems  Pertinent items are noted in HPI     Objective:      Pulse 103   Temp 97.7 °F (36.5 °C) (Axillary)   Resp 24   Wt 15 kg (33 lb)   SpO2 100%    Oxygen saturation 100% on room air  General: alert, appears stated age, cooperative and no distress without apparent respiratory distress.   Cyanosis: absent   Grunting: absent   Nasal flaring: absent   Retractions: absent   HEENT:  right TM normal without fluid or infection, neck without nodes, throat normal without erythema or exudate, nasal mucosa congested and left with d/c from PET   Neck: no adenopathy   Lungs: clear to auscultation bilaterally   Heart: regular rate and rhythm, S1, S2 normal, no murmur, click, rub or gallop   Extremities:  extremities normal, atraumatic, no cyanosis or edema      Neurological: alert, oriented x 3, no defects noted in general exam.        Assessment:        1. Left chronic otitis media         Plan:      .  Whitney was seen today for cough.    Diagnoses and all orders for this visit:    Left chronic otitis media    Other orders  -     amoxicillin-clavulanate (AUGMENTIN) 400-57 mg/5 mL SusR; Take 3.75 mLs (300 mg total) by mouth 2 (two) times daily. for 7 days

## 2025-05-23 ENCOUNTER — PATIENT OUTREACH (OUTPATIENT)
Dept: RESEARCH | Facility: CLINIC | Age: 60
End: 2025-05-23
Payer: COMMERCIAL

## 2025-05-23 NOTE — PROGRESS NOTES
05/23/2025  8:53 AM    Patient Name Carol Collins   Appointment Department [facility] PROP WEIGHT MANAGEMENT  Visit Type Audio (Virtual visit)    Clinic Weights   Wt Readings from Last 3 Encounters:   03/06/25 1444 98.5 kg (217 lb 2.5 oz)   12/20/24 1056 98.2 kg (216 lb 7.9 oz)   09/20/24 0746 95.7 kg (211 lb)     Last Reported Weights No data was found      [facility] PROP INTERVENTION CONTACT:   Method of contact:  Phone Call   or Participant-Initiated Contact?:  -initiated contact  Type of intervention Contact:  Scheduled Session  Did the participant attend session?: Yes    Was the patient called within 15 minutes of the scheduled appointment?:  Yes  Is this a make-up session?: No    Which session materials covered in session?:  Session 36  Patient Reported Weight (From External Aria):  212  Time workout: Not specified.  Average Daily Steps: Not specified.  Calorie Prescription::  1600  Safety Criteria Triggered:  None  Toolbox Triggered:  None  Weight Graph Stoplight Status for Dietary Adherence:  Green Light       Goals    None          Additional Notes:    Follow up call to patient for [employer] monthly session. Patient states she is doing well overall. Patient's weight is the same from last month. Pleased with weight maintenance and attributes to consistency with lifestyle choices.  She explained that erroneous weight on 5/21 was after she was dressed for the day.  Removed from graph.    Patient states that her son's wedding was beautiful and she is very pleased with how she felt and looked for the occasion.  This was her motivation for joining the propel study and she admits that she did not lose as much weight as she wanted to but is pleased with her progress and plans to continue losing weight.  She also explained that she retired at the end of April and has been in [place], [place] with her sister who recently had surgery.  She is looking forward to establishing a new group home routine and having more time  "for her health endeavors.    Discussed session material for 36 cravings.  Patient enjoys this material and explained that she usually avoids this temptation by keeping cravings (chocolate) out of her house.    Doing well with walking and getting her steps in.  Planning to start taking yoga twice a month and will start yoga videos at home during the week days.    She is trying to rely less on the proportion meals and preparing and portioning her food herself now.  Using meal prep containers to organize.    Goals:  Continue following 1600 calorie "budget"  Continue meal prep using portion control containers  Yoga class twice per month, weekday yoga videos    JUANCARLOS [facility]  [facility]   [phone]   "

## 2025-06-03 ENCOUNTER — PATIENT MESSAGE (OUTPATIENT)
Dept: INTERNAL MEDICINE | Facility: CLINIC | Age: 60
End: 2025-06-03
Payer: COMMERCIAL

## 2025-06-05 ENCOUNTER — TELEPHONE (OUTPATIENT)
Dept: INTERNAL MEDICINE | Facility: CLINIC | Age: 60
End: 2025-06-05
Payer: COMMERCIAL

## 2025-06-06 ENCOUNTER — OFFICE VISIT (OUTPATIENT)
Dept: INTERNAL MEDICINE | Facility: CLINIC | Age: 60
End: 2025-06-06
Payer: COMMERCIAL

## 2025-06-06 VITALS
HEART RATE: 63 BPM | HEIGHT: 65 IN | OXYGEN SATURATION: 97 % | WEIGHT: 215.06 LBS | SYSTOLIC BLOOD PRESSURE: 120 MMHG | BODY MASS INDEX: 35.83 KG/M2 | DIASTOLIC BLOOD PRESSURE: 70 MMHG

## 2025-06-06 DIAGNOSIS — Z12.31 ENCOUNTER FOR SCREENING MAMMOGRAM FOR BREAST CANCER: ICD-10-CM

## 2025-06-06 DIAGNOSIS — R20.0 NUMBNESS OF TOES: Primary | ICD-10-CM

## 2025-06-06 DIAGNOSIS — Z00.00 HEALTH CARE MAINTENANCE: ICD-10-CM

## 2025-06-06 PROCEDURE — 99999 PR PBB SHADOW E&M-EST. PATIENT-LVL IV: CPT | Mod: PBBFAC,,, | Performed by: INTERNAL MEDICINE

## 2025-06-20 ENCOUNTER — PATIENT OUTREACH (OUTPATIENT)
Dept: RESEARCH | Facility: CLINIC | Age: 60
End: 2025-06-20
Payer: COMMERCIAL

## 2025-06-20 NOTE — PROGRESS NOTES
06/20/2025  8:23 AM    Patient Name Carol Collins   Appointment Department [facility] PROPEL WEIGHT MANAGEMENT  Visit Type Audio (Virtual visit)    Clinic Weights   Wt Readings from Last 3 Encounters:   06/06/25 0801 97.6 kg (215 lb 0.9 oz)   03/06/25 1444 98.5 kg (217 lb 2.5 oz)   12/20/24 1056 98.2 kg (216 lb 7.9 oz)     Last Reported Weights No data was found      [facility] INTERVENTION CONTACT:   Method of contact:  Phone Call   or Participant-Initiated Contact?:  -initiated contact  Type of intervention Contact:  Scheduled Session  Did the participant attend session?: Yes    Was the patient called within 15 minutes of the scheduled appointment?:  Yes  Is this a make-up session?: No    Which session materials covered in session?:  Session 37  Patient Reported Weight (From External Aria):  214  Time workout: Not reported.  Average Daily Steps: 8,000-10,000.  Calorie Prescription::  1600  Safety Criteria Triggered:  None  Toolbox Triggered:  None  Weight Graph Stoplight Status for Dietary Adherence:  Green Light       Goals    None          Additional Notes:    Follow up call to patient for propel monthly session. Patient states she is doing well overall. Patient's weight is the same from last month. Pleased with weight loss/weight maintenance and attributes to consistency with lifestyle choices.    Discussed session material for 37 - Added sugar.    Doing well with maintaining weight loss and doing yoga videos at home.     Aiming to consistently walk between 8000-30781 steps per day.  She is also trying different meal plans to meet her 1600 calorie goal, utilizing some proportioned frozen meals and home cooking (meal prep).  She plans to use chat GPT to develop a plan.  Discussed cooking oil options that would not upset her stomach, encouraged her to try spray olive and spray avocado oils.    Goals:  Continue following 1600 calorie meal plan, using chat GPT for ideas  8,000-10,000 steps per day, yoga videos  at home        Arianne Novoa [facility]  [facility]   [phone]

## 2025-06-25 ENCOUNTER — PATIENT MESSAGE (OUTPATIENT)
Dept: ADMINISTRATIVE | Facility: OTHER | Age: 60
End: 2025-06-25
Payer: COMMERCIAL

## 2025-07-14 ENCOUNTER — OFFICE VISIT (OUTPATIENT)
Dept: OPTOMETRY | Facility: CLINIC | Age: 60
End: 2025-07-14
Payer: COMMERCIAL

## 2025-07-14 DIAGNOSIS — E11.9 DIABETES MELLITUS WITHOUT COMPLICATION: Primary | ICD-10-CM

## 2025-07-14 DIAGNOSIS — H52.13 MYOPIA OF BOTH EYES: ICD-10-CM

## 2025-07-14 DIAGNOSIS — Z97.3 WEARS CONTACT LENSES: ICD-10-CM

## 2025-07-14 DIAGNOSIS — H40.013 OPEN ANGLE WITH BORDERLINE FINDINGS AND LOW GLAUCOMA RISK IN BOTH EYES: ICD-10-CM

## 2025-07-14 PROCEDURE — 92310 CONTACT LENS FITTING OU: CPT | Mod: CSM,,, | Performed by: OPTOMETRIST

## 2025-07-14 PROCEDURE — 99999 PR PBB SHADOW E&M-EST. PATIENT-LVL II: CPT | Mod: PBBFAC,,, | Performed by: OPTOMETRIST

## 2025-07-14 PROCEDURE — 92015 DETERMINE REFRACTIVE STATE: CPT | Mod: ,,, | Performed by: OPTOMETRIST

## 2025-07-14 PROCEDURE — 92014 COMPRE OPH EXAM EST PT 1/>: CPT | Mod: ,,, | Performed by: OPTOMETRIST

## 2025-07-14 NOTE — PROGRESS NOTES
HPI    Pt is here today for diabetic ocular health examination. Denies   pain/discomfort. Patient would like to update glasses and cl rx. States   that she does not see well when reading with her current glasses rx and   with her cl, everything is blurred.   DLS: 6/21/2024 Dr. Kelly  (-)Flashes   (-)Floaters   (-)Diplopia   (-)Headaches   (-)Itching   (-)Tearing  (-)Burning  (-)Dryness   (-)Photophobia  (+)Glare   (-)Blurred VA  Past Eye Sx: (-)  Eye Meds: (-)   Hemoglobin A1C       Date                     Value               Ref Range             Status                08/28/2024               5.5                 4.0 - 5.6 %           Final            Last edited by Rebeka Kelly, OD on 7/14/2025  9:52 AM.            Assessment /Plan     For exam results, see Encounter Report.    Diabetes mellitus without complication    Myopia of both eyes    Wears contact lenses    Open angle with borderline findings and low glaucoma risk in both eyes  -     Nunez Visual Field - OU - Extended - Both Eyes; Future; Expected date: 09/08/2025  -     OCT, Optic Nerve - OU - Both Eyes; Future; Expected date: 09/08/2025      1. No retinopathy noted today.  Continued control with primary care physician and annual comprehensive eye exam.     2.   Eyeglass Final Rx       Eyeglass Final Rx         Sphere Cylinder Axis Add    Right -5.00 Sphere  +2.50    Left -4.25 +0.50 110 +2.50      Type: PAL    Expiration Date: 7/14/2026                   3. Updated CL Rx. Initially good comfort and vision. Given CL trials to take home. If happy with comfort and vision of trial lenses, may order annual supply. If issues arise, RTC for CL f/u. Reviewed proper CL care and hygiene. Monitor yearly unless changes noted sooner.      Contact Lens Current Rx       Current Contact Lens Rx (Trial Lens, Dispensed)         Brand Diameter Sphere Cylinder Lens Dist VA Near VA Centration Movement    Right Acuvue Oasys 1 Day 14.3 -2.50 Sphere NEAR  20/20  Well-centered Adequate    Left Acuvue Oasys 1 Day 14.3 -4.00 Sphere DIST 20/20-  Well-centered Adequate                   4. Educated pt on findings. (+)FHx. Will order baseline RNFL OCT and 24-2 HVF. Patient to have testing in 1-2 months. Will call with results. Determine further management/treatment at that time. Monitor 1-2 months.      RTC in 2 months for glaucoma testing, 1 year for annual eye exam unless changes noted sooner.

## 2025-07-15 ENCOUNTER — TELEPHONE (OUTPATIENT)
Dept: INTERNAL MEDICINE | Facility: CLINIC | Age: 60
End: 2025-07-15
Payer: COMMERCIAL

## 2025-07-15 DIAGNOSIS — Z00.00 HEALTH CARE MAINTENANCE: Primary | ICD-10-CM

## 2025-07-15 NOTE — TELEPHONE ENCOUNTER
----- Message from Rosina sent at 7/15/2025  2:16 PM CDT -----  Patient is requesting new lab orders. She is scheduled for  but has to be out of town and the orders  on that date. Call patient back once orders are in at [phone]    Thanks   MARQUIS

## 2025-07-18 ENCOUNTER — PATIENT OUTREACH (OUTPATIENT)
Dept: RESEARCH | Facility: CLINIC | Age: 60
End: 2025-07-18
Payer: COMMERCIAL

## 2025-07-18 NOTE — PROGRESS NOTES
07/18/2025  8:24 AM    Patient Name Carol Collins   Appointment Department [facility]  Visit Type Audio (Virtual visit)    Clinic Weights   Wt Readings from Last 3 Encounters:   06/06/25 0801 97.6 kg (215 lb 0.9 oz)   03/06/25 1444 98.5 kg (217 lb 2.5 oz)   12/20/24 1056 98.2 kg (216 lb 7.9 oz)     Last Reported Weights No data was found      [facility] INTERVENTION CONTACT:   Method of contact:  Phone Call   or Participant-Initiated Contact?:  -initiated contact  Type of intervention Contact:  Scheduled Session  Did the participant attend session?: Yes    Was the patient called within 15 minutes of the scheduled appointment?:  Yes  Is this a make-up session?: No    Which session materials covered in session?:  Session 38  Patient Reported Weight (From External [employer]):  215  Time workout: not specified.  Average Daily Steps: not reported.  Calorie Prescription::  1600  Safety Criteria Triggered:  None  Toolbox Triggered:  Adherence to diet  Adherence to diet: Health  must choose at least two interventions from list::  Reduce portion size and Use of motivational interviewing  Weight Graph Stoplight Status for Dietary Adherence:  Yellow Light - In the Zone       Goals    None          Additional Notes:    Follow up call to patient for [facility] monthly session. Patient states she is doing well overall. Patient's weight is slightly increased from last month. Acknowledges weight fluctuation and attributes to inconsistencies in diet/exercise. Has been doing some travel and cooking for 4th of July holiday.    Discussed session material for 38-challenge yourself.    Doing well with walking - has increased her speed. Also doing yoga & hand weights exercises at home.    Aiming to improve her diet by eating some portion controlled meals and cooking some meals/meal prep at home. Also planning to get more sleep and re-establishing a consistent bedtime.     Goals:  Following 1,600 calorie meal  plan  Continue exercising (walking, yoga, & weights) - aiming for 150 minutes/week        JUANCARLOS [facility]  [facility]   [phone]

## 2025-07-19 ENCOUNTER — PATIENT MESSAGE (OUTPATIENT)
Dept: OPTOMETRY | Facility: CLINIC | Age: 60
End: 2025-07-19
Payer: COMMERCIAL

## 2025-07-24 ENCOUNTER — PATIENT MESSAGE (OUTPATIENT)
Dept: OPTOMETRY | Facility: CLINIC | Age: 60
End: 2025-07-24
Payer: COMMERCIAL

## 2025-08-04 ENCOUNTER — LAB VISIT (OUTPATIENT)
Dept: LAB | Facility: HOSPITAL | Age: 60
End: 2025-08-04
Attending: INTERNAL MEDICINE
Payer: COMMERCIAL

## 2025-08-04 DIAGNOSIS — R20.0 NUMBNESS OF TOES: ICD-10-CM

## 2025-08-04 DIAGNOSIS — Z00.00 HEALTH CARE MAINTENANCE: ICD-10-CM

## 2025-08-04 LAB
ABSOLUTE EOSINOPHIL (OHS): 0.05 K/UL
ABSOLUTE MONOCYTE (OHS): 0.27 K/UL (ref 0.3–1)
ABSOLUTE NEUTROPHIL COUNT (OHS): 1.54 K/UL (ref 1.8–7.7)
ALBUMIN SERPL BCP-MCNC: 3.9 G/DL (ref 3.5–5.2)
ALP SERPL-CCNC: 118 UNIT/L (ref 40–150)
ALT SERPL W/O P-5'-P-CCNC: 24 UNIT/L (ref 0–55)
ANION GAP (OHS): 10 MMOL/L (ref 8–16)
AST SERPL-CCNC: 26 UNIT/L (ref 0–50)
BASOPHILS # BLD AUTO: 0.03 K/UL
BASOPHILS NFR BLD AUTO: 0.8 %
BILIRUB SERPL-MCNC: 0.3 MG/DL (ref 0.1–1)
BUN SERPL-MCNC: 16 MG/DL (ref 8–23)
CALCIUM SERPL-MCNC: 9.1 MG/DL (ref 8.7–10.5)
CHLORIDE SERPL-SCNC: 104 MMOL/L (ref 95–110)
CHOLEST SERPL-MCNC: 244 MG/DL (ref 120–199)
CHOLEST/HDLC SERPL: 4.4 {RATIO} (ref 2–5)
CO2 SERPL-SCNC: 26 MMOL/L (ref 23–29)
CREAT SERPL-MCNC: 0.9 MG/DL (ref 0.5–1.4)
EAG (OHS): 108 MG/DL (ref 68–131)
ERYTHROCYTE [DISTWIDTH] IN BLOOD BY AUTOMATED COUNT: 13.2 % (ref 11.5–14.5)
GFR SERPLBLD CREATININE-BSD FMLA CKD-EPI: >60 ML/MIN/1.73/M2
GLUCOSE SERPL-MCNC: 91 MG/DL (ref 70–110)
HBA1C MFR BLD: 5.4 % (ref 4–5.6)
HCT VFR BLD AUTO: 47.2 % (ref 37–48.5)
HDLC SERPL-MCNC: 55 MG/DL (ref 40–75)
HDLC SERPL: 22.5 % (ref 20–50)
HGB BLD-MCNC: 14.7 GM/DL (ref 12–16)
IMM GRANULOCYTES # BLD AUTO: 0 K/UL (ref 0–0.04)
IMM GRANULOCYTES NFR BLD AUTO: 0 % (ref 0–0.5)
LDLC SERPL CALC-MCNC: 172.6 MG/DL (ref 63–159)
LYMPHOCYTES # BLD AUTO: 1.74 K/UL (ref 1–4.8)
MCH RBC QN AUTO: 29 PG (ref 27–31)
MCHC RBC AUTO-ENTMCNC: 31.1 G/DL (ref 32–36)
MCV RBC AUTO: 93 FL (ref 82–98)
NONHDLC SERPL-MCNC: 189 MG/DL
NUCLEATED RBC (/100WBC) (OHS): 0 /100 WBC
PLATELET # BLD AUTO: 254 K/UL (ref 150–450)
PMV BLD AUTO: 12.2 FL (ref 9.2–12.9)
POTASSIUM SERPL-SCNC: 4 MMOL/L (ref 3.5–5.1)
PROT SERPL-MCNC: 7.8 GM/DL (ref 6–8.4)
RBC # BLD AUTO: 5.07 M/UL (ref 4–5.4)
RELATIVE EOSINOPHIL (OHS): 1.4 %
RELATIVE LYMPHOCYTE (OHS): 47.9 % (ref 18–48)
RELATIVE MONOCYTE (OHS): 7.4 % (ref 4–15)
RELATIVE NEUTROPHIL (OHS): 42.5 % (ref 38–73)
SODIUM SERPL-SCNC: 140 MMOL/L (ref 136–145)
TRIGL SERPL-MCNC: 82 MG/DL (ref 30–150)
TSH SERPL-ACNC: 1.77 UIU/ML (ref 0.4–4)
VIT B12 SERPL-MCNC: 704 PG/ML (ref 210–950)
WBC # BLD AUTO: 3.63 K/UL (ref 3.9–12.7)

## 2025-08-04 PROCEDURE — 86765 RUBEOLA ANTIBODY: CPT

## 2025-08-04 PROCEDURE — 85025 COMPLETE CBC W/AUTO DIFF WBC: CPT

## 2025-08-04 PROCEDURE — 86735 MUMPS ANTIBODY: CPT

## 2025-08-04 PROCEDURE — 86787 VARICELLA-ZOSTER ANTIBODY: CPT

## 2025-08-04 PROCEDURE — 86762 RUBELLA ANTIBODY: CPT

## 2025-08-04 PROCEDURE — 83036 HEMOGLOBIN GLYCOSYLATED A1C: CPT

## 2025-08-04 PROCEDURE — 82607 VITAMIN B-12: CPT

## 2025-08-04 PROCEDURE — 84450 TRANSFERASE (AST) (SGOT): CPT

## 2025-08-04 PROCEDURE — 80061 LIPID PANEL: CPT

## 2025-08-04 PROCEDURE — 36415 COLL VENOUS BLD VENIPUNCTURE: CPT

## 2025-08-04 PROCEDURE — 86706 HEP B SURFACE ANTIBODY: CPT

## 2025-08-04 PROCEDURE — 84443 ASSAY THYROID STIM HORMONE: CPT

## 2025-08-05 LAB
MUMPS IGG (OHS): 66.9 AU/ML
MUMPS IGG INTERPRETATION (OHS): POSITIVE
RUBEOLA (MEASLES) IGG (OHS): 103 AU/ML
RUBEOLA IGG INTERP. (OHS): POSITIVE
RUBV IGG SER-ACNC: 24.1 IU/ML
RUBV IGG SER-IMP: REACTIVE
V.ZOSTER IGG INTERP (OHS): POSITIVE
VARICELLA ZOSTER IGG (OHS): 39.4 S/CO

## 2025-08-07 LAB
W HEPATITIS B SURFACE ANTIBODY, QUALITATIVE: NEGATIVE
W HEPATITIS B SURFACE ANTIBODY, QUANTITATIVE: <3 MIU/ML

## 2025-08-15 ENCOUNTER — PATIENT OUTREACH (OUTPATIENT)
Dept: RESEARCH | Facility: CLINIC | Age: 60
End: 2025-08-15
Payer: COMMERCIAL

## 2025-08-20 ENCOUNTER — OFFICE VISIT (OUTPATIENT)
Dept: INTERNAL MEDICINE | Facility: CLINIC | Age: 60
End: 2025-08-20
Payer: COMMERCIAL

## 2025-08-20 VITALS
BODY MASS INDEX: 36.36 KG/M2 | OXYGEN SATURATION: 97 % | HEIGHT: 65 IN | WEIGHT: 218.25 LBS | DIASTOLIC BLOOD PRESSURE: 80 MMHG | HEART RATE: 61 BPM | SYSTOLIC BLOOD PRESSURE: 122 MMHG

## 2025-08-20 DIAGNOSIS — M54.16 LUMBAR NEURITIS: ICD-10-CM

## 2025-08-20 DIAGNOSIS — Z00.00 HEALTH CARE MAINTENANCE: Primary | ICD-10-CM

## 2025-08-20 PROCEDURE — 1160F RVW MEDS BY RX/DR IN RCRD: CPT | Mod: CPTII,S$GLB,, | Performed by: INTERNAL MEDICINE

## 2025-08-20 PROCEDURE — 99396 PREV VISIT EST AGE 40-64: CPT | Mod: S$GLB,,, | Performed by: INTERNAL MEDICINE

## 2025-08-20 PROCEDURE — 1159F MED LIST DOCD IN RCRD: CPT | Mod: CPTII,S$GLB,, | Performed by: INTERNAL MEDICINE

## 2025-08-20 PROCEDURE — 3079F DIAST BP 80-89 MM HG: CPT | Mod: CPTII,S$GLB,, | Performed by: INTERNAL MEDICINE

## 2025-08-20 PROCEDURE — 3044F HG A1C LEVEL LT 7.0%: CPT | Mod: CPTII,S$GLB,, | Performed by: INTERNAL MEDICINE

## 2025-08-20 PROCEDURE — 3074F SYST BP LT 130 MM HG: CPT | Mod: CPTII,S$GLB,, | Performed by: INTERNAL MEDICINE

## 2025-08-20 PROCEDURE — 3008F BODY MASS INDEX DOCD: CPT | Mod: CPTII,S$GLB,, | Performed by: INTERNAL MEDICINE

## 2025-08-20 PROCEDURE — 99999 PR PBB SHADOW E&M-EST. PATIENT-LVL IV: CPT | Mod: PBBFAC,,, | Performed by: INTERNAL MEDICINE
